# Patient Record
Sex: MALE | Race: WHITE | NOT HISPANIC OR LATINO | Employment: OTHER | URBAN - METROPOLITAN AREA
[De-identification: names, ages, dates, MRNs, and addresses within clinical notes are randomized per-mention and may not be internally consistent; named-entity substitution may affect disease eponyms.]

---

## 2017-05-09 ENCOUNTER — TRANSCRIBE ORDERS (OUTPATIENT)
Dept: ADMINISTRATIVE | Facility: HOSPITAL | Age: 62
End: 2017-05-09

## 2017-05-09 DIAGNOSIS — R79.89 ELEVATED LFTS: Primary | ICD-10-CM

## 2019-03-26 ENCOUNTER — OFFICE VISIT CONVERTED (OUTPATIENT)
Dept: SURGERY | Facility: CLINIC | Age: 64
End: 2019-03-26
Attending: SURGERY

## 2019-04-15 ENCOUNTER — HOSPITAL ENCOUNTER (OUTPATIENT)
Dept: OTHER | Facility: HOSPITAL | Age: 64
Discharge: HOME | End: 2019-04-15

## 2019-04-15 LAB
ALBUMIN SERPL-MCNC: 4.3 G/DL (ref 3.5–5)
ALP SERPL-CCNC: 58 U/L (ref 56–155)
ALT SERPL-CCNC: 32 U/L (ref 10–40)
ANION GAP SERPL CALC-SCNC: 16 MMOL/L (ref 8–19)
AST SERPL-CCNC: 24 U/L (ref 15–50)
BILIRUB SERPL-MCNC: 0.45 MG/DL (ref 0.2–1.3)
BUN SERPL-MCNC: 23 MG/DL (ref 5–25)
BUN/CREAT SERPL: 19 {RATIO} (ref 6–20)
CALCIUM SERPL-MCNC: 9.3 MG/DL (ref 8.7–10.4)
CHLORIDE SERPL-SCNC: 106 MMOL/L (ref 99–111)
CONV BILI, CONJUGATED: <0.2 MG/DL (ref 0–0.6)
CONV CO2: 29 MMOL/L (ref 22–32)
CONV CREATININE URINE, RANDOM: 74.6 MG/DL (ref 10–300)
CONV MICROALBUM.,U,RANDOM: <12 MG/L (ref 0–20)
CONV TOTAL PROTEIN: 6.7 G/DL (ref 6.3–8.2)
CONV UNCONJUGATED BILIRUBIN: 0.3 MG/DL (ref 0–1.1)
CREAT UR-MCNC: 1.21 MG/DL (ref 0.7–1.2)
EST. AVERAGE GLUCOSE BLD GHB EST-MCNC: 177 MG/DL
GFR SERPLBLD BASED ON 1.73 SQ M-ARVRAT: >60 ML/MIN/{1.73_M2}
GLUCOSE SERPL-MCNC: 139 MG/DL (ref 70–99)
HBA1C MFR BLD: 7.8 % (ref 3.5–5.7)
MICROALBUMIN/CREAT UR: 16.1 MG/G{CRE} (ref 0–25)
OSMOLALITY SERPL CALC.SUM OF ELEC: 306 MOSM/KG (ref 273–304)
POTASSIUM SERPL-SCNC: 6.2 MMOL/L (ref 3.5–5.3)
SODIUM SERPL-SCNC: 145 MMOL/L (ref 135–147)
TSH SERPL-ACNC: 1.06 M[IU]/L (ref 0.27–4.2)

## 2019-04-23 ENCOUNTER — HOSPITAL ENCOUNTER (OUTPATIENT)
Dept: OTHER | Facility: HOSPITAL | Age: 64
Discharge: HOME | End: 2019-04-23

## 2019-04-23 LAB — POTASSIUM SERPL-SCNC: 5.2 MMOL/L (ref 3.5–5.3)

## 2019-04-24 ENCOUNTER — HOSPITAL ENCOUNTER (OUTPATIENT)
Dept: GASTROENTEROLOGY | Facility: HOSPITAL | Age: 64
Setting detail: HOSPITAL OUTPATIENT SURGERY
Discharge: HOME | End: 2019-04-24
Attending: SURGERY

## 2019-04-24 LAB — GLUCOSE BLD-MCNC: 165 MG/DL (ref 70–99)

## 2019-08-19 ENCOUNTER — HOSPITAL ENCOUNTER (OUTPATIENT)
Dept: URGENT CARE | Facility: CLINIC | Age: 64
Discharge: HOME | End: 2019-08-19
Attending: FAMILY MEDICINE

## 2020-06-05 ENCOUNTER — OFFICE VISIT CONVERTED (OUTPATIENT)
Dept: FAMILY MEDICINE CLINIC | Facility: CLINIC | Age: 65
End: 2020-06-05
Attending: FAMILY MEDICINE

## 2020-09-16 ENCOUNTER — OFFICE VISIT CONVERTED (OUTPATIENT)
Dept: FAMILY MEDICINE CLINIC | Facility: CLINIC | Age: 65
End: 2020-09-16
Attending: FAMILY MEDICINE

## 2020-09-18 ENCOUNTER — HOSPITAL ENCOUNTER (OUTPATIENT)
Dept: GENERAL RADIOLOGY | Facility: HOSPITAL | Age: 65
Discharge: HOME | End: 2020-09-18
Attending: FAMILY MEDICINE

## 2020-09-18 LAB
ALBUMIN SERPL-MCNC: 4.4 G/DL (ref 3.5–5)
ALBUMIN/GLOB SERPL: 1.9 {RATIO} (ref 1.4–2.6)
ALP SERPL-CCNC: 41 U/L (ref 56–155)
ALT SERPL-CCNC: 54 U/L (ref 10–40)
ANION GAP SERPL CALC-SCNC: 20 MMOL/L (ref 8–19)
AST SERPL-CCNC: 45 U/L (ref 15–50)
BASOPHILS # BLD AUTO: 0.03 10*3/UL (ref 0–0.2)
BASOPHILS NFR BLD AUTO: 0.9 % (ref 0–3)
BILIRUB SERPL-MCNC: 0.4 MG/DL (ref 0.2–1.3)
BUN SERPL-MCNC: 25 MG/DL (ref 5–25)
BUN/CREAT SERPL: 25 {RATIO} (ref 6–20)
CALCIUM SERPL-MCNC: 10.6 MG/DL (ref 8.7–10.4)
CHLORIDE SERPL-SCNC: 100 MMOL/L (ref 99–111)
CHOLEST SERPL-MCNC: 190 MG/DL (ref 107–200)
CHOLEST/HDLC SERPL: 5.9 {RATIO} (ref 3–6)
CONV ABS IMM GRAN: 0 10*3/UL (ref 0–0.2)
CONV CO2: 26 MMOL/L (ref 22–32)
CONV IMMATURE GRAN: 0 % (ref 0–1.8)
CONV TOTAL PROTEIN: 6.7 G/DL (ref 6.3–8.2)
CREAT UR-MCNC: 1.01 MG/DL (ref 0.7–1.2)
DEPRECATED RDW RBC AUTO: 45.6 FL (ref 35.1–43.9)
EOSINOPHIL # BLD AUTO: 0.06 10*3/UL (ref 0–0.7)
EOSINOPHIL # BLD AUTO: 1.8 % (ref 0–7)
ERYTHROCYTE [DISTWIDTH] IN BLOOD BY AUTOMATED COUNT: 13.2 % (ref 11.6–14.4)
GFR SERPLBLD BASED ON 1.73 SQ M-ARVRAT: >60 ML/MIN/{1.73_M2}
GLOBULIN UR ELPH-MCNC: 2.3 G/DL (ref 2–3.5)
GLUCOSE SERPL-MCNC: 195 MG/DL (ref 70–99)
HCT VFR BLD AUTO: 47.7 % (ref 42–52)
HDLC SERPL-MCNC: 32 MG/DL (ref 40–60)
HGB BLD-MCNC: 14.4 G/DL (ref 14–18)
LDLC SERPL CALC-MCNC: 71 MG/DL (ref 70–100)
LYMPHOCYTES # BLD AUTO: 1.57 10*3/UL (ref 1–5)
LYMPHOCYTES NFR BLD AUTO: 48 % (ref 20–45)
MCH RBC QN AUTO: 28.5 PG (ref 27–31)
MCHC RBC AUTO-ENTMCNC: 30.2 G/DL (ref 33–37)
MCV RBC AUTO: 94.5 FL (ref 80–96)
MONOCYTES # BLD AUTO: 0.24 10*3/UL (ref 0.2–1.2)
MONOCYTES NFR BLD AUTO: 7.3 % (ref 3–10)
NEUTROPHILS # BLD AUTO: 1.37 10*3/UL (ref 2–8)
NEUTROPHILS NFR BLD AUTO: 42 % (ref 30–85)
NRBC CBCN: 0 % (ref 0–0.7)
OSMOLALITY SERPL CALC.SUM OF ELEC: 302 MOSM/KG (ref 273–304)
PLATELET # BLD AUTO: 119 10*3/UL (ref 130–400)
PMV BLD AUTO: 11 FL (ref 9.4–12.4)
POTASSIUM SERPL-SCNC: 4.6 MMOL/L (ref 3.5–5.3)
PSA SERPL-MCNC: <0.01 NG/ML (ref 0–4)
RBC # BLD AUTO: 5.05 10*6/UL (ref 4.7–6.1)
SODIUM SERPL-SCNC: 141 MMOL/L (ref 135–147)
TESTOST SERPL-MCNC: 15 NG/DL (ref 193–740)
TRIGL SERPL-MCNC: 453 MG/DL (ref 40–150)
TSH SERPL-ACNC: 1.34 M[IU]/L (ref 0.27–4.2)
WBC # BLD AUTO: 3.27 10*3/UL (ref 4.8–10.8)

## 2020-09-23 ENCOUNTER — HOSPITAL ENCOUNTER (OUTPATIENT)
Dept: GENERAL RADIOLOGY | Facility: HOSPITAL | Age: 65
Discharge: HOME | End: 2020-09-23
Attending: FAMILY MEDICINE

## 2020-09-23 ENCOUNTER — OFFICE VISIT CONVERTED (OUTPATIENT)
Dept: FAMILY MEDICINE CLINIC | Facility: CLINIC | Age: 65
End: 2020-09-23
Attending: FAMILY MEDICINE

## 2020-09-23 LAB
CONV CREATININE URINE, RANDOM: 85.3 MG/DL (ref 10–300)
CONV MICROALBUM.,U,RANDOM: <12 MG/L (ref 0–20)
EST. AVERAGE GLUCOSE BLD GHB EST-MCNC: 220 MG/DL
HBA1C MFR BLD: 9.3 % (ref 3.5–5.7)
MICROALBUMIN/CREAT UR: 14.1 MG/G{CRE} (ref 0–25)
TESTOSTERONE, FREE: 1.3 PG/ML (ref 6.6–18.1)

## 2021-01-29 ENCOUNTER — HOSPITAL ENCOUNTER (OUTPATIENT)
Dept: GENERAL RADIOLOGY | Facility: HOSPITAL | Age: 66
Discharge: HOME | End: 2021-01-29
Attending: FAMILY MEDICINE

## 2021-01-29 LAB
CHOLEST SERPL-MCNC: 169 MG/DL (ref 107–200)
CHOLEST/HDLC SERPL: 6 {RATIO} (ref 3–6)
EST. AVERAGE GLUCOSE BLD GHB EST-MCNC: 217 MG/DL
HBA1C MFR BLD: 9.2 % (ref 3.5–5.7)
HDLC SERPL-MCNC: 28 MG/DL (ref 40–60)
LDLC SERPL CALC-MCNC: 32 MG/DL (ref 70–100)
TRIGL SERPL-MCNC: 586 MG/DL (ref 40–150)

## 2021-01-31 LAB — BACTERIA UR CULT: NORMAL

## 2021-02-04 ENCOUNTER — CONVERSION ENCOUNTER (OUTPATIENT)
Dept: FAMILY MEDICINE CLINIC | Facility: CLINIC | Age: 66
End: 2021-02-04

## 2021-02-04 ENCOUNTER — OFFICE VISIT CONVERTED (OUTPATIENT)
Dept: FAMILY MEDICINE CLINIC | Facility: CLINIC | Age: 66
End: 2021-02-04
Attending: FAMILY MEDICINE

## 2021-05-10 NOTE — H&P
History and Physical      Patient Name: Chaparro Weber   Patient ID: [id number]   Sex: Male   YOB: 1955    Primary Care Provider: Blanca Dalton MD   Referring Provider: Blanca Dalton MD    Visit Date: 2020    Provider: Charlotte Kulkarni DO   Location: [facility]   Location Address: [address] [place] [address] [address]  [place], [place]  [id number]   Location Phone: [phone]          Chief Complaint  · Establish Care      History Of Present Illness  Chaparro Weber is a 64 year old /White male who presents for evaluation and treatment of:      He is here to establish relations as a new patient. He is .  He is retired for [employer].  He has a past medical history significant for asthma, COPD, congestive heart failure, chronic back pain managed by pain management, CAD with pace maker, diabetes, hypertension, hyperlipidemia, kidney stones.  He has a family history of coronary disease and colon cancer and his dad  at 74.  He is previous smoker smoked for approximately 12 years.  He denies alcohol use.    He has lost wt. and is down from 320. He places pickle Ball.     He takes Lyrica and hydrocodone for chronic pain.    He sees urology in [facility] for urinary retension.     He has a history of hernia.     He is blood sugar is running around 200-300.     He sees [facility] spine for degenerative disc disease.    His only complaint today is that he is having dull pain that comes and goes on his right lower abdomen.  Otherwise he has no other complaints today denies chest pain, shortness of breath, weakness, numbness, nausea, vomiting, diarrhea, dizziness or syncopal event.           Past Medical History  Disease Name Date Onset Notes   Anxiety --  --    Arthritis --  --    Asthma --  --    Chest pain --  --    Chronic Obstructive Pulmonary Disease --  --    Congestive heart failure --  --    Deafness --  --    Diabetes --  --    Heart Disease --  --     Hernia --  --    High blood pressure --  --    High cholesterol --  --    Kidney stones --  --    Lung disease --  --    Night sweats --  --    Ringing in ear --  --          Past Surgical History  Procedure Name Date Notes   Appendectomy --  --    Back 2000, 2010 x 2   Cardiac --  pacemaker   Colonoscopy --  --    Gallbladder 2016 --    Kidney --  --    Neck Surgery 2005 --    Pacemaker --  --          Medication List  Name Date Started Instructions   Alfonzo Aspirin 325 mg oral tablet  take 1 tablet (325 mg) by oral route once daily   Citrucel 500 mg oral tablet  take 1 tablet by oral route daily   escitalopram oxalate 20 mg oral tablet  take 1 tablet (20 mg) by oral route once daily   hydrocodone-acetaminophen 7.5-325 mg oral tablet  take 1 tablet by oral route every 8 hours   Lyrica 150 mg oral capsule  take 1 capsule (150 mg) by oral route 2 times per day   metoprolol succinate 25 mg oral tablet extended release 24 hr  take 1 tablet (25 mg) by oral route once daily   Move Free Joint Health 750 mg-100 mg- 1.65 mg-108 mg oral tablet  take 1 tablet by oral route daily   nateglinide 60 mg oral tablet  take 1 tablet (60 mg) by oral route 3 times per day 1 to 30 minutes prior to meals   Synjardy XR 12.5-1,000 mg oral tablet, IR - ER, biphasic 24hr  take 2 tablets by oral route once daily in the morning with a meal   Tricor 145 mg oral tablet  take 1 tablet (145 mg) by oral route once daily   valsartan 160 mg oral tablet  take 1 tablet (160 mg) by oral route once daily         Allergy List  Allergen Name Date Reaction Notes   NO KNOWN DRUG ALLERGIES --  --  --          Family Medical History  Disease Name Relative/Age Notes   Prostate cancer Father/   Father   Family history of colon cancer Father/70s   Father/70s   Renal Cancer Mother/60s   Mother/60s  Mother/40s  Mother/70s         Social History  Finding Status Start/Stop Quantity Notes   Alcohol Never --/-- --  drinks no   Caffeine Current - status unknown --/--  "--  drinks 1-2 times per day  drinks regularly; coffee, tea and soft drinks; 1-2 times per day  drinks regularly; 1-2 times per day   Second hand smoke exposure Never --/-- --  no   Tobacco Former 20/30 --  former smoker; started smoking at age 22; quit smoking at age 32; smoked 30 cigarette(s) per day  former smoker; started smoking at age 22; quit smoking at age 32; smoked 30 cigarette(s) per day  never a smoker; quit smoking at age 30         Review of Systems  · Constitutional  o Denies  o : fever, fatigue, weight loss, weight gain  · HENT  o Denies  o : headaches  · Cardiovascular  o Denies  o : pedal edema, claudication, chest pressure, palpitations  · Respiratory  o Denies  o : shortness of breath, wheezing, cough, hemoptysis, dyspnea on exertion  · Gastrointestinal  o Admits  o : abdominal pain  o Denies  o : nausea, vomiting, diarrhea, constipation  · Genitourinary  o Admits  o : frequency, difficulty voiding  o Denies  o : urgency  · Neurologic  o Denies  o : altered mental status  · Musculoskeletal  o Admits  o : back pain  · Endocrine  o Denies  o : polyuria  · Psychiatric  o Denies  o : anxiety, depression, suicidal ideation  · Heme-Lymph  o Denies  o : easy bleeding, easy bruising, edema, lymph node enlargement or tenderness      Vitals  Date Time BP Position Site L\R Cuff Size HR RR TEMP (F) WT  HT  BMI kg/m2 BSA m2 O2 Sat        06/05/2020 01:30 /73 Sitting    90 - R 20 98.3 258lbs 4oz 6'  2\" 33.16 2.47 95 %          Physical Examination  · Constitutional  o Appearance  o : morbidly obese, well developed, alert, no acute distress  · Head and Face  o Head  o :   § Inspection  § : atraumatic, normocephalic  · Eyes  o Eyes  o : extraocular movements intact, no scleral icterus, no conjunctival injection  · Ears, Nose, Mouth and Throat  o Nose  o :   § Intranasal Exam  § : nares patent  o Oral Cavity  o :   § Oral Mucosa  § : moist mucous membranes  · Respiratory  o Respiratory  o : breathing " comfortably, symmetric chest rise, clear to asculatation bilaterally, no wheezes, rales, or rhonchii  · Cardiovascular  o Heart  o :   § Auscultation of Heart  § : regular rate and rhythm, no murmurs, rubs, or gallops  o Peripheral Vascular System  o :   § Extremities  § : no edema  · Gastrointestinal  o Abdominal Examination  o :   § Abdomen  § : bowel sounds present, non-distended, non-tender, no masses or hernias appreciated  · Skin and Subcutaneous Tissue  o General Inspection  o : no rashes present, no lesions present, no areas of discoloration, skin turgor normal  · Neurologic  o Cranial Nerves  o : crainial nerves 2-12 grossly intact  · Psychiatric  o General  o : normal mood and affect      Figure 1.0: Pain Rating Scale-Lorin         Assessment  · Diabetes mellitus, type 2     250.00/E11.9  · Essential hypertension     401.9/I10  · Hyperlipidemia     272.4/E78.5  · Other long term (current) drug therapy     V58.69/Z79.899  · Screening for depression     V79.0/Z13.89  · Screening for lipid disorders     V77.91/Z13.220  · Screening for colon cancer     V76.51/Z12.11  · Establishing care with new doctor, encounter for     V65.8/Z76.89  · Adult BMI 33.0-33.9 kg/sq m     V85.33/Z68.33      Plan  · Orders  o ACO-18: Negative screen for clinical depression using a standardized tool () - V79.0/Z13.89 - 06/05/2020  o JAMSHID Report (KASPR) - V58.69/Z79.899 - 06/05/2020  o ACO-39: Current medications updated and reviewed () - - 06/05/2020  o ACO-18: Negative screen for clinical depression using a standardized tool () - - 06/05/2020  o ACO-13: Fall Risk Screening with no falls in past year or only one fall without injury in the past year (1101F) - - 06/05/2020  · Medications  o Medications have been Reconciled  o Transition of Care or Provider Policy  · Instructions  o Advised that cheeses and other sources of dairy fats, animal fats, fast food, and the extras (candy, pastries, pies, doughnuts and  cookies) all contain LDL raising nutrients. Advised to increase fruits, vegetables, whole grains, and to monitor portion sizes.   o Depression Screen completed and scanned into the EMR under the designated folder within the patient's documents.  o Patient was educated/instructed on their diagnosis, treatment and medications prior to discharge from the clinic today.  · Disposition  o Follow Up in 3 months     1.  Type 2 diabetes: The patient was encouraged to continue taking his Synjardy as prescribed.  He was given lab order today for a hemoglobin A1c and microalbumin to be managed according to findings.  2.  Hypertension: The patient's blood pressures currently well controlled on his antihypertensive medications.  3.  Hyperlipidemia: Patient will be continued on his current dosage of TriCor 145 mg daily.  4.  Obesity: Diet, weight loss and exercise were highly encouraged today's visit.  Follow-up 3 months.             Electronically Signed by: Charlotte Kulkarni DO -Author on June 5, 2020 06:37:02 PM

## 2021-05-13 NOTE — PROGRESS NOTES
Progress Note      Patient Name: Chaparro Weber   Patient ID: [id number]   Sex: Male   YOB: 1955    Primary Care Provider: Charlotte Kulkarni DO   Referring Provider: Charlotte Kulkarni DO    Visit Date: 2020    Provider: Charlotte Kulkarni DO   Location: [facility]   Location Address: [address] [place] [address] [address]  [place], [place]  [id number]   Location Phone: [phone]          Chief Complaint  · 1 week follow up       History Of Present Illness  Chaparro Weber is a 65 year old /White male who presents for evaluation and treatment of:      He is here today for the management of his chronic medical conditions. He has a past medical history significant for asthma, COPD, congestive heart failure, chronic back pain managed by pain management, CAD with pace maker, diabetes, hypertension, very low testosterone levels, hyperlipidemia, kidney stones.  He has a family history of coronary disease and colon cancer and his dad  at 74.  He is previous smoker and smoked for approximately 12 years.  He denies alcohol use.    He sees [facility] spine for degenerative disc disease.    He has lost wt. and is down from 320. He plays [employer] Ball regularly for exercise.     He takes Lyrica and hydrocodone for chronic pain and is managed by pain management.    He sees urology in [facility] for urinary retention.     He has a history of hernia.     He is blood sugar is running around 200-300.     He is complaining today of low libido. He was taking testosterone injections about 6 years ago. He denies hx of prostate cancer or elevated PSA.    He also feels like his memory is not as intact as it has been in the past. He is concerned the Lyrica is causing him to be forgetful.    Otherwise he has no complaints today and denies chest pain, shortness of breath, weakness, numbness, nausea, vomiting, diarrhea, dizziness or syncopal event.            Past Medical  History  Disease Name Date Onset Notes   Anxiety --  --    Arthritis --  --    Asthma --  --    Chest pain --  --    Chronic Obstructive Pulmonary Disease --  --    Congestive heart failure --  --    Deafness --  --    Diabetes --  --    Heart Disease --  --    Hernia --  --    High blood pressure --  --    High cholesterol --  --    Kidney stones --  --    Lung disease --  --    Night sweats --  --    Ringing in ear --  --          Past Surgical History  Procedure Name Date Notes   Appendectomy --  --    Back 2000, 2010 x 2   Cardiac --  pacemaker   Colonoscopy --  --    Gallbladder 2016 --    Kidney --  --    Neck Surgery 2005 --    Pacemaker --  --          Medication List  Name Date Started Instructions   Alfonzo Aspirin 325 mg oral tablet  take 1 tablet (325 mg) by oral route once daily   Citrucel 500 mg oral tablet  take 1 tablet by oral route daily   escitalopram oxalate 20 mg oral tablet 09/16/2020 take 1 tablet (20 mg) by oral route once daily for 90 days   hydrocodone-acetaminophen 7.5-325 mg oral tablet  take 1 tablet by oral route every 8 hours   Lyrica 150 mg oral capsule  take 1 capsule (150 mg) by oral route 2 times per day   metoprolol succinate 25 mg oral tablet extended release 24 hr  take 1 tablet (25 mg) by oral route once daily   Move Free Joint Health 750 mg-100 mg- 1.65 mg-108 mg oral tablet  take 1 tablet by oral route daily   nateglinide 60 mg oral tablet 09/16/2020 take 1 tablet (60 mg) by oral route 3 times per day 1 to 30 minutes prior to meals for 90 days   rosuvastatin 5 mg oral tablet 09/29/2020 take 1 tablet (5 mg) by oral route once daily at bedtime for 30 days   Synjardy XR 12.5-1,000 mg oral tablet, IR - ER, biphasic 24hr 09/16/2020 take 2 tablets by oral route once daily in the morning with a meal for 90 days   Tricor 145 mg oral tablet 09/16/2020 take 1 tablet (145 mg) by oral route once daily for 90 days   valsartan 160 mg oral tablet  take 1 tablet (160 mg) by oral route once  "daily         Allergy List  Allergen Name Date Reaction Notes   NO KNOWN DRUG ALLERGIES --  --  --          Family Medical History  Disease Name Relative/Age Notes   Prostate cancer Father/   Father   Family history of colon cancer Father/70s   Father/70s   Renal Cancer Mother/60s   Mother/60s  Mother/40s  Mother/70s         Social History  Finding Status Start/Stop Quantity Notes   Alcohol Never --/-- --  drinks no   Caffeine Current - status unknown --/-- --  drinks 1-2 times per day  drinks regularly; coffee, tea and soft drinks; 1-2 times per day  drinks regularly; 1-2 times per day   Second hand smoke exposure Never --/-- --  no   Tobacco Former 20/30 --  former smoker; started smoking at age 22; quit smoking at age 32; smoked 30 cigarette(s) per day  former smoker; started smoking at age 22; quit smoking at age 32; smoked 30 cigarette(s) per day  never a smoker; quit smoking at age 30         Immunizations  NameDate Admin Mfg Trade Name Lot Number Route Inj VIS Given VIS Publication   Pcetckioq70/02/2020 SKB Fluarix, quadrivalent, preservative free [id number] IM UKN 09/23/2020    Comments: patient had at pharmacy 3wks ago         Review of Systems  · Constitutional  o * See HPI  · HENT  o * See HPI  · Cardiovascular  o * See HPI  · Respiratory  o * See HPI  · Gastrointestinal  o * See HPI  · Genitourinary  o * See HPI  · Neurologic  o * See HPI  · Musculoskeletal  o * See HPI  · Heme-Lymph  o * See HPI      Vitals  Date Time BP Position Site L\R Cuff Size HR RR TEMP (F) WT  HT  BMI kg/m2 BSA m2 O2 Sat FR L/min FiO2        09/23/2020 09:22 /63 Sitting    83 - R 20 98.1 251lbs 6oz 6'  2\" 32.27 2.44 93 %  21%          Physical Examination  · Constitutional  o Appearance  o : obese, well developed, alert, no obvious deformities present, NAD  · Head and Face  o Head  o :   § Inspection  § : atraumatic, normocephalic  · Ears, Nose, Mouth and Throat  o Ears  o :   § External Ears  § : normal  o Nose  o : "   § Intranasal Exam  § : nares patent  o Oral Cavity  o :   § Oral Mucosa  § : moist mucous membranes  · Respiratory  o Respiratory Effort  o : breathing comfortably, symmetric chest rise  o Auscultation of Lungs  o : clear to asculatation bilaterally, no wheezes, rales, or rhonchii  · Cardiovascular  o Heart  o :   § Auscultation of Heart  § : regular rate and rhythm, no murmurs, rubs, or gallops  o Peripheral Vascular System  o :   § Extremities  § : no edema  · Skin and Subcutaneous Tissue  o General Inspection  o : no rashes present, no lesions present, no areas of discoloration, skin turgor normal, texture normal  · Neurologic  o Mental Status Examination  o :   § Orientation  § : grossly oriented to person, place and time  o Cranial Nerves  o : cranial nerves intact bilaterally  o Gait and Station  o :   § Gait Screening  § : normal gait  · Psychiatric  o General  o : normal mood and affect              Assessment  · Diabetes mellitus, type 2     250.00/E11.9  will check A1C as this was not collected with his most recent labs and will manage according to findings. He does not want to start any new medications today and is wanted to adjust his diet instead.  · Hyperlipidemia     272.4/E78.5  start crestor 5 mg Po HS as his Triglycerides are still elevated and he is diabetic  · Low testosterone in male     790.99/R79.89  patient wants to see urology to see if he will be able to do replacement injections himself      Plan  · Orders  o Hgb A1c [facility] ([id number]) - 250.00/E11.9 - 09/23/2020  o Lipid Panel [facility] ([id number]) - 272.4/E78.5 - 12/25/2020  o ACO-39: Current medications updated and reviewed () - - 09/23/2020  o ACO-14: Influenza immunization administered or previously received () - - 09/23/2020  o ACO-19: Colorectal cancer screening results documented and reviewed (3017F) - - 09/23/2020  o Microalbumin urine (48303) - 250.00/E11.9 - 09/23/2020  o UROLOGY CONSULTATION (UROLO) - 790.99/R79.89 - 09/23/2020    Dr. Warner please  o Hgb A1c [facility] ([id number]) - 250.00/E11.9 - 12/25/2020  · Medications  o rosuvastatin 5 mg oral tablet   SIG: take 1 tablet (5 mg) by oral route once daily at bedtime for 30 days   DISP: (30) tablets with 5 refills  Prescribed on 09/23/2020     o Medications have been Reconciled  o Transition of Care or Provider Policy  · Instructions  o Advised that cheeses and other sources of dairy fats, animal fats, fast food, and the extras (candy, pastries, pies, doughnuts and cookies) all contain LDL raising nutrients. Advised to increase fruits, vegetables, whole grains, and to monitor portion sizes.   o Patient was educated/instructed on their diagnosis, treatment and medications prior to discharge from the clinic today.  · Disposition  o Follow Up in 3 months            Electronically Signed by: Charlotte Kulkarni DO -Author on October 4, 2020 02:39:13 PM

## 2021-05-13 NOTE — PROGRESS NOTES
Progress Note      Patient Name: Chaparro Weber   Patient ID: [id number]   Sex: Male   YOB: 1955    Primary Care Provider: Charlotte Kulkarni DO   Referring Provider: Charlotte Kulkarni DO    Visit Date: 2020    Provider: Charlotte Kulkarni DO   Location: [facility]   Location Address: [address] [place] [address] [address]  [place], [place]  [id number]   Location Phone: [phone]          Chief Complaint  · 3 mo follow up  · wants labs done for low testosterone       History Of Present Illness  Chaparro Weber is a 65 year old /White male who presents for evaluation and treatment of:      He is here today for the management of his chronic medical conditions. He is .  He is retired for [employer].  He has a past medical history significant for asthma, COPD, congestive heart failure, chronic back pain managed by pain management, CAD with pace maker, diabetes, hypertension, hyperlipidemia, kidney stones.  He has a family history of coronary disease and colon cancer and his dad  at 74.  He is previous smoker smoked for approximately 12 years.  He denies alcohol use.    He has lost wt. and is down from 320. He plays pickle Ball.     He takes Lyrica and hydrocodone for chronic pain.    He sees urology in [facility] for urinary retension.     He has a history of hernia.     He is blood sugar is running around 200-300.     He sees [facility] spine for degenerative disc disease.    He sees [facility] spine for degenerative disc disease.    He wants his testosterone levels checked. He was taking testosterone injections about 6 years ago. He denies hx of prostate cancer or elevated PSA.    He also feels like his memory is not as intact as it has been in the past. He is concerned the gabapentin in causing him to be forgetful.    His only complaint today is that he is having dull pain that comes and goes on his right lower abdomen.  Otherwise he has no other  complaints today denies chest pain, shortness of breath, weakness, numbness, nausea, vomiting, diarrhea, dizziness or syncopal event.       Past Medical History  Disease Name Date Onset Notes   Anxiety --  --    Arthritis --  --    Asthma --  --    Chest pain --  --    Chronic Obstructive Pulmonary Disease --  --    Congestive heart failure --  --    Deafness --  --    Diabetes --  --    Heart Disease --  --    Hernia --  --    High blood pressure --  --    High cholesterol --  --    Kidney stones --  --    Lung disease --  --    Night sweats --  --    Ringing in ear --  --          Past Surgical History  Procedure Name Date Notes   Appendectomy --  --    Back 2000, 2010 x 2   Cardiac --  pacemaker   Colonoscopy --  --    Gallbladder 2016 --    Kidney --  --    Neck Surgery 2005 --    Pacemaker --  --          Medication List  Name Date Started Instructions   Alfonzo Aspirin 325 mg oral tablet  take 1 tablet (325 mg) by oral route once daily   Citrucel 500 mg oral tablet  take 1 tablet by oral route daily   escitalopram oxalate 20 mg oral tablet 09/16/2020 take 1 tablet (20 mg) by oral route once daily for 90 days   hydrocodone-acetaminophen 7.5-325 mg oral tablet  take 1 tablet by oral route every 8 hours   Lyrica 150 mg oral capsule  take 1 capsule (150 mg) by oral route 2 times per day   metoprolol succinate 25 mg oral tablet extended release 24 hr  take 1 tablet (25 mg) by oral route once daily   [facility] [employer] 750 mg-100 mg- 1.65 mg-108 mg oral tablet  take 1 tablet by oral route daily   nateglinide 60 mg oral tablet 09/16/2020 take 1 tablet (60 mg) by oral route 3 times per day 1 to 30 minutes prior to meals for 90 days   Synjardy XR 12.5-1,000 mg oral tablet, IR - ER, biphasic 24hr 09/16/2020 take 2 tablets by oral route once daily in the morning with a meal for 90 days   Tricor 145 mg oral tablet 09/16/2020 take 1 tablet (145 mg) by oral route once daily for 90 days   valsartan 160 mg oral tablet  take  "1 tablet (160 mg) by oral route once daily         Allergy List  Allergen Name Date Reaction Notes   NO KNOWN DRUG ALLERGIES --  --  --          Family Medical History  Disease Name Relative/Age Notes   Prostate cancer Father/   Father   Family history of colon cancer Father/70s   Father/70s   Renal Cancer Mother/60s   Mother/60s  Mother/40s  Mother/70s         Social History  Finding Status Start/Stop Quantity Notes   Alcohol Never --/-- --  drinks no   Caffeine Current - status unknown --/-- --  drinks 1-2 times per day  drinks regularly; coffee, tea and soft drinks; 1-2 times per day  drinks regularly; 1-2 times per day   Second hand smoke exposure Never --/-- --  no   Tobacco Former 20/30 --  former smoker; started smoking at age 22; quit smoking at age 32; smoked 30 cigarette(s) per day  former smoker; started smoking at age 22; quit smoking at age 32; smoked 30 cigarette(s) per day  never a smoker; quit smoking at age 30         Review of Systems  · Constitutional  o Denies  o : fever, fatigue, weight loss, weight gain  · Cardiovascular  o Denies  o : lower extremity edema, claudication, chest pressure, palpitations  · Respiratory  o Denies  o : shortness of breath, wheezing, cough, hemoptysis, dyspnea on exertion  · Gastrointestinal  o Denies  o : nausea, vomiting, diarrhea, constipation, abdominal pain  · Genitourinary  o Admits  o : decreased libido  · Neurologic  o Denies  o : tingling or numbness  · Musculoskeletal  o Denies  o : muscular weakness      Vitals  Date Time BP Position Site L\R Cuff Size HR RR TEMP (F) WT  HT  BMI kg/m2 BSA m2 O2 Sat        09/16/2020 01:58 PM 95/62 Sitting    92 - R 18 98.2 256lbs 0oz 6'  2\" 32.87 2.46 95 %          Physical Examination  · Constitutional  o Appearance  o : morbidly obese, well developed, alert, no obvious deformities present, NAD  · Head and Face  o Head  o :   § Inspection  § : atraumatic, normocephalic  · Ears, Nose, Mouth and Throat  o Ears  o : "   § External Ears  § : normal  o Nose  o :   § Intranasal Exam  § : nares patent  o Oral Cavity  o :   § Oral Mucosa  § : moist mucous membranes  · Respiratory  o Respiratory Effort  o : breathing comfortably, symmetric chest rise  o Auscultation of Lungs  o : clear to asculatation bilaterally, no wheezes, rales, or rhonchii  · Cardiovascular  o Heart  o :   § Auscultation of Heart  § : regular rate and rhythm, no murmurs, rubs, or gallops  o Peripheral Vascular System  o :   § Extremities  § : no edema  · Neurologic  o Mental Status Examination  o :   § Orientation  § : grossly oriented to person, place and time  o Cranial Nerves  o : cranial nerves intact bilaterally  o Gait and Station  o :   § Gait Screening  § : normal gait  · Psychiatric  o General  o : normal mood and affect              Assessment  · Diabetes mellitus, type 2     250.00/E11.9  discussed weight management, diet and exercise. Will check A1C  · Screening for lipid disorders     V77.91/Z13.220  · Decreased libido     799.81/R68.82  He was given lab orders today for his testosterone level to be managed according to finding.  · Forgetfulness     780.99/R68.89  This could be due to low T levels, they are being checked.   · Diabetic neuropathy       Type 2 diabetes mellitus with diabetic neuropathy, unspecified     250.60/E11.40  continue lyrica, lower BG levels with medicaiton ,diet and exercise to decrease symptoms      Plan  · Orders  o Hgb A1c [facility] (30202) - 250.00/E11.9 - 09/16/2020  o Male Physical Primary Care Panel (CMP, CBC, TSH, Lipid, PSA) [facility] (61863, 90650, 41353, 85668, 72939, ) - 250.00/E11.9, 799.81/R68.82, V77.91/Z13.220 - 09/16/2020  o ACO-39: Current medications updated and reviewed () - - 09/16/2020  o ACO-15: Pneumococcal Vaccine Administered or Previously Received (4040F) - - 09/16/2020  o ACO-14: Influenza immunization administered or previously received () - - 09/16/2020  o ACO-19: Colorectal cancer screening  results documented and reviewed (3017F) - - 09/16/2020  o ACO-13: Fall Risk Screening with no falls in past year or only one fall without injury in the past year (1101F) - - 09/16/2020  o ACO - Pt declines to or was not able to provide an Advance Care Plan or name a Surrogate Decision Maker (1124F) - - 09/16/2020  o Testosterone, Free (Direct) (00200) - 799.81/R68.82 - 09/16/2020  o Testosterone (Total) (63529) - 799.81/R68.82 - 09/16/2020  · Medications  o Medications have been Reconciled  o Transition of Care or Provider Policy  · Instructions  o Patient was educated/instructed on their diagnosis, treatment and medications prior to discharge from the clinic today.  · Disposition  o Return Visit Request in/on 1 week +/- 2 days (29634).            Electronically Signed by: Charlotte Kulkarni DO -Author on September 20, 2020 09:53:07 PM

## 2021-05-14 VITALS
HEIGHT: 74 IN | RESPIRATION RATE: 20 BRPM | DIASTOLIC BLOOD PRESSURE: 80 MMHG | WEIGHT: 256.37 LBS | SYSTOLIC BLOOD PRESSURE: 110 MMHG | TEMPERATURE: 97.2 F | BODY MASS INDEX: 32.9 KG/M2 | HEART RATE: 80 BPM | OXYGEN SATURATION: 96 %

## 2021-05-14 VITALS
SYSTOLIC BLOOD PRESSURE: 95 MMHG | WEIGHT: 256 LBS | HEART RATE: 92 BPM | HEIGHT: 74 IN | TEMPERATURE: 98.2 F | DIASTOLIC BLOOD PRESSURE: 62 MMHG | OXYGEN SATURATION: 95 % | BODY MASS INDEX: 32.85 KG/M2 | RESPIRATION RATE: 18 BRPM

## 2021-05-14 VITALS
OXYGEN SATURATION: 93 % | SYSTOLIC BLOOD PRESSURE: 101 MMHG | TEMPERATURE: 98.1 F | BODY MASS INDEX: 32.26 KG/M2 | DIASTOLIC BLOOD PRESSURE: 63 MMHG | WEIGHT: 251.37 LBS | RESPIRATION RATE: 20 BRPM | HEIGHT: 74 IN | HEART RATE: 83 BPM

## 2021-05-14 NOTE — PROGRESS NOTES
Progress Note      Patient Name: Chaparro Weber   Patient ID: [id number]   Sex: Male   YOB: 1955    Primary Care Provider: Charlotte Kulkarni DO   Referring Provider: Charlotte Kulkarni DO    Visit Date: 2021    Provider: Charlotte Kulkarni DO   Location: [facility]   Location Address: [address] [place] [address]  [id number]   Location Phone: [phone]          Chief Complaint  · 3 mo follow up   · discuss medications   · go over labs   · left foot numbness       History Of Present Illness  Chaparro Weber is a 65 year old /White male who presents for evaluation and treatment of:      He is here today for the management of his chronic medical conditions. He has a past medical history significant for asthma, COPD, congestive heart failure, chronic back pain managed by pain management, CAD with pace maker, diabetes, hypertension, very low testosterone levels, hyperlipidemia, kidney stones.  He has a family history of coronary disease and colon cancer and his dad  at 74.  He is previous smoker and smoked for approximately 12 years.  He denies alcohol use.    Colonoscopy  Flu/pneumonia/Covid    Labs 2021: Hemoglobin A1c 9.2, estimated average glucose 217, triglyceride 586, cholesterol 169, , HDL 28.    Labs 2020: Hemoglobin 14.4, MCV 94.5, platelet 119, potassium 4.6, creatinine 1.01, GFR greater than 60, glucose 195, calcium 10.6, ALT 54, AST 45, TSH 1.34, total testosterone 15, free testosterone 1.3.    He sees [facility] spine for degenerative disc disease. He takes Lyrica and hydrocodone for chronic pain and is managed by pain management.    He plays pickle Ball regularly for exercise.     He sees urology in [place] for urinary retention.     He is blood sugar is running around 200-300. He has gained 5 pounds since his next visit.    He is still having low libido. He has very low Testosterone but is not interested in endogenous  replacement. He denies hx of prostate cancer or elevated PSA.    Otherwise he has no complaints today and denies chest pain, shortness of breath, weakness, numbness, nausea, vomiting, diarrhea, dizziness or syncopal event.                 Past Medical History  Disease Name Date Onset Notes   Anxiety --  --    Arthritis --  --    Asthma --  --    Chest pain --  --    Chronic Obstructive Pulmonary Disease --  --    Congestive heart failure --  --    Deafness --  --    Diabetes --  --    Heart Disease --  --    Hernia --  --    High blood pressure --  --    High cholesterol --  --    Kidney stones --  --    Lung disease --  --    Night sweats --  --    Ringing in ear --  --          Past Surgical History  Procedure Name Date Notes   Appendectomy --  --    Back 2000, 2010 x 2   Cardiac --  pacemaker   Colonoscopy --  --    Gallbladder 2016 --    Kidney --  --    Neck Surgery 2005 --    Pacemaker --  --          Medication List  Name Date Started Instructions   Alfonzo Aspirin 325 mg oral tablet  take 1 tablet (325 mg) by oral route once daily   Citrucel 500 mg oral tablet  take 1 tablet by oral route daily   escitalopram oxalate 20 mg oral tablet 12/14/2020 take 1 tablet (20 mg) by oral route once daily for 90 days   hydrocodone-acetaminophen 7.5-325 mg oral tablet  take 1 tablet by oral route every 8 hours   Lyrica 150 mg oral capsule  take 1 capsule (150 mg) by oral route 2 times per day   metoprolol succinate 25 mg oral tablet extended release 24 hr 01/14/2021 take 1 tablet (25 mg) by oral route once daily for 30 days   [facility] 750 mg-100 mg- 1.65 mg-108 mg oral tablet  take 1 tablet by oral route daily   OneTouch Verio miscellaneous strip 11/25/2020 USE AS DIRECTED TO TEST BLOOD SUGAR TWICE DAILY   rosuvastatin 5 mg oral tablet 09/29/2020 take 1 tablet (5 mg) by oral route once daily at bedtime for 30 days   Synjardy XR 12.5-1,000 mg oral tablet, IR - ER, biphasic 24hr 12/09/2020 take 2 tablets by oral  "route once daily in the morning with a meal for 90 days   Tricor 145 mg oral tablet 09/16/2020 take 1 tablet (145 mg) by oral route once daily for 90 days   valsartan 160 mg oral tablet 01/19/2021 take 1 tablet (160 mg) by oral route once daily for 30 days         Allergy List  Allergen Name Date Reaction Notes   NO KNOWN DRUG ALLERGIES --  --  --          Family Medical History  Disease Name Relative/Age Notes   Prostate cancer Father/   Father   Family history of colon cancer Father/70s   Father/70s   Renal Cancer Mother/60s   Mother/60s  Mother/40s  Mother/70s         Social History  Finding Status Start/Stop Quantity Notes   Alcohol Never --/-- --  drinks no   Caffeine Current - status unknown --/-- --  drinks 1-2 times per day  drinks regularly; coffee, tea and soft drinks; 1-2 times per day  drinks regularly; 1-2 times per day   Second hand smoke exposure Never --/-- --  no   Tobacco Former 20/30 --  former smoker; started smoking at age 22; quit smoking at age 32; smoked 30 cigarette(s) per day  former smoker; started smoking at age 22; quit smoking at age 32; smoked 30 cigarette(s) per day  never a smoker; quit smoking at age 30         Immunizations  NameDate Admin Mfg Trade Name Lot Number Route Inj VIS Given VIS Publication   [id number]/02/2020 SKB Fluarix, quadrivalent, preservative free [id number]  UKN 09/23/2020    Comments: patient had at pharmacy 3wks ago         Review of Systems  · Constitutional  o * See HPI  · HENT  o * See HPI  · Cardiovascular  o * See HPI  · Respiratory  o * See HPI  · Gastrointestinal  o * See HPI  · Genitourinary  o * See HPI  · Neurologic  o * See HPI  · Musculoskeletal  o * See HPI      Vitals  Date Time BP Position Site L\R Cuff Size HR RR TEMP (F) WT  HT  BMI kg/m2 BSA m2 O2 Sat FR L/min FiO2 HC       02/04/2021 11:04 /80 Sitting    80 - R 20 97.2 256lbs 6oz 6'  2\" 32.92 2.46 96 %  21%          Physical Examination  · Constitutional  o Appearance  o : no acute " distress, well-nourished  · Head and Face  o Head  o :   § Inspection  § : atraumatic, normocephalic  · Ears, Nose, Mouth and Throat  o Ears  o :   § External Ears  § : normal  o Nose  o :   § Intranasal Exam  § : nares patent  o Oral Cavity  o :   § Oral Mucosa  § : moist mucous membranes  · Respiratory  o Respiratory Effort  o : breathing comfortably, symmetric chest rise  o Auscultation of Lungs  o : clear to asculatation bilaterally, no wheezes, rales, or rhonchii  · Cardiovascular  o Heart  o :   § Auscultation of Heart  § : regular rate and rhythm, no murmurs, rubs, or gallops  o Peripheral Vascular System  o :   § Extremities  § : no edema  · Neurologic  o Mental Status Examination  o :   § Orientation  § : grossly oriented to person, place and time  o Gait and Station  o :   § Gait Screening  § : normal gait  · Psychiatric  o General  o : normal mood and affect      Figure 1.0: Diabetic Foot Screen             Assessment  · Diabetes mellitus, type 2     250.00/E11.9  The patient's diabetes is currently not well controlled. He was given a prescription today of Victoza to be taken as directed.  · Essential hypertension     401.9/I10  The patient's blood pressures currently well controlled.  · Adult BMI 32.0-32.9 kg/sq m     V85.32/Z68.32  Diet, weight loss and exercise were highly encouraged today's visit.  · Hypogonadism in male     257.2/E29.1  The patient's not interested in testosterone replacement at this time.      Plan  · Orders  o ACO-14: Influenza immunization was not administered for reasons documented [facility] () - - 02/04/2021  o ACO-19: Colorectal cancer screening results documented and reviewed (5927F) - - 02/04/2021  o ACO - Pt declines to or was not able to provide an Advance Care Plan or name a Surrogate Decision Maker (3669F) - - 02/04/2021  o ACO-39: Current medications updated and reviewed (3369F, ) - - 02/04/2021  · Medications  o Trulicity 0.75 mg/0.5 mL subcutaneous pen injector    SIG: inject 0.5 milliliter (0.75 mg) by subcutaneous route every 7 days for 28 days   DISP: (1) Package with 2 refills  Prescribed on 02/04/2021     o Medications have been Reconciled  o Transition of Care or Provider Policy  · Instructions  o Patient was educated/instructed on their diagnosis, treatment and medications prior to discharge from the clinic today.  · Disposition  o Follow Up in 3 months            Electronically Signed by: Charlotte Kulkarni DO -Author on February 5, 2021 06:28:30 PM

## 2021-05-15 VITALS
DIASTOLIC BLOOD PRESSURE: 73 MMHG | HEART RATE: 90 BPM | TEMPERATURE: 98.3 F | SYSTOLIC BLOOD PRESSURE: 129 MMHG | WEIGHT: 258.25 LBS | HEIGHT: 74 IN | RESPIRATION RATE: 20 BRPM | BODY MASS INDEX: 33.14 KG/M2 | OXYGEN SATURATION: 95 %

## 2021-05-15 VITALS — BODY MASS INDEX: 34.52 KG/M2 | RESPIRATION RATE: 14 BRPM | HEIGHT: 74 IN | WEIGHT: 269 LBS

## 2021-05-21 ENCOUNTER — HOSPITAL ENCOUNTER (OUTPATIENT)
Dept: GENERAL RADIOLOGY | Facility: HOSPITAL | Age: 66
Discharge: HOME | End: 2021-05-21
Attending: NURSE PRACTITIONER

## 2021-07-13 ENCOUNTER — TELEPHONE (OUTPATIENT)
Dept: FAMILY MEDICINE CLINIC | Facility: CLINIC | Age: 66
End: 2021-07-13

## 2021-07-13 NOTE — TELEPHONE ENCOUNTER
Caller: Chaparro Weber    Relationship: Self    Best call back number: [phone]    What orders are you requesting (i.e. lab or imaging): BLOOD WORK FOR APPOINTMENT ON 07.16.2021 AND TESTOSTERONE TEST.     In what timeframe would the patient need to come in: BEFORE 07.16.2021    Where will you receive your lab/imaging services: IN OFFICE LAB    Additional notes:

## 2021-07-16 ENCOUNTER — OFFICE VISIT (OUTPATIENT)
Dept: FAMILY MEDICINE CLINIC | Facility: CLINIC | Age: 66
End: 2021-07-16

## 2021-07-16 VITALS
TEMPERATURE: 97.6 F | OXYGEN SATURATION: 95 % | DIASTOLIC BLOOD PRESSURE: 78 MMHG | BODY MASS INDEX: 33.57 KG/M2 | RESPIRATION RATE: 18 BRPM | HEIGHT: 74 IN | SYSTOLIC BLOOD PRESSURE: 115 MMHG | WEIGHT: 261.6 LBS | HEART RATE: 77 BPM

## 2021-07-16 DIAGNOSIS — I10 ESSENTIAL HYPERTENSION: ICD-10-CM

## 2021-07-16 DIAGNOSIS — Z12.5 SCREENING FOR PROSTATE CANCER: ICD-10-CM

## 2021-07-16 DIAGNOSIS — E29.1 HYPOGONADISM IN MALE: ICD-10-CM

## 2021-07-16 DIAGNOSIS — Z00.00 ANNUAL PHYSICAL EXAM: ICD-10-CM

## 2021-07-16 DIAGNOSIS — E78.49 OTHER HYPERLIPIDEMIA: ICD-10-CM

## 2021-07-16 DIAGNOSIS — E11.43 TYPE 2 DIABETES MELLITUS WITH DIABETIC AUTONOMIC NEUROPATHY, WITHOUT LONG-TERM CURRENT USE OF INSULIN (HCC): Primary | ICD-10-CM

## 2021-07-16 PROBLEM — F41.9 ANXIETY: Status: ACTIVE | Noted: 2021-07-16

## 2021-07-16 PROBLEM — J45.909 ASTHMA: Status: ACTIVE | Noted: 2021-07-16

## 2021-07-16 PROBLEM — M19.90 ARTHRITIS: Status: ACTIVE | Noted: 2021-07-16

## 2021-07-16 PROBLEM — K21.9 GERD (GASTROESOPHAGEAL REFLUX DISEASE): Status: ACTIVE | Noted: 2021-07-16

## 2021-07-16 PROBLEM — I44.7 LEFT BUNDLE BRANCH BLOCK: Status: ACTIVE | Noted: 2021-07-16

## 2021-07-16 PROBLEM — N20.0 KIDNEY STONES: Status: ACTIVE | Noted: 2021-07-16

## 2021-07-16 PROBLEM — E11.42 DIABETIC PERIPHERAL NEUROPATHY: Status: ACTIVE | Noted: 2021-07-16

## 2021-07-16 PROBLEM — E78.5 HYPERLIPIDEMIA: Status: ACTIVE | Noted: 2021-07-16

## 2021-07-16 PROBLEM — D75.1 ACQUIRED POLYCYTHEMIA: Status: ACTIVE | Noted: 2021-07-16

## 2021-07-16 PROBLEM — J44.9 COPD (CHRONIC OBSTRUCTIVE PULMONARY DISEASE): Status: ACTIVE | Noted: 2021-07-16

## 2021-07-16 PROBLEM — I42.8 NICM (NONISCHEMIC CARDIOMYOPATHY): Status: ACTIVE | Noted: 2021-07-16

## 2021-07-16 PROBLEM — I51.9 HEART DISEASE: Status: ACTIVE | Noted: 2021-07-16

## 2021-07-16 PROCEDURE — G0439 PPPS, SUBSEQ VISIT: HCPCS | Performed by: FAMILY MEDICINE

## 2021-07-16 RX ORDER — METOPROLOL SUCCINATE 25 MG/1
TABLET, EXTENDED RELEASE ORAL
COMMUNITY
Start: 2021-04-28 | End: 2022-01-27

## 2021-07-16 RX ORDER — FENOFIBRATE 145 MG/1
TABLET, COATED ORAL
COMMUNITY
Start: 2021-05-09 | End: 2021-11-16

## 2021-07-16 RX ORDER — ESCITALOPRAM OXALATE 20 MG/1
TABLET ORAL
COMMUNITY
Start: 2021-07-01 | End: 2021-10-04

## 2021-07-16 RX ORDER — IBUPROFEN 200 MG
200 TABLET ORAL EVERY 6 HOURS PRN
COMMUNITY

## 2021-07-16 RX ORDER — VALSARTAN 160 MG/1
TABLET ORAL
COMMUNITY
Start: 2021-01-20 | End: 2021-12-10

## 2021-07-16 RX ORDER — MELATONIN
2000 DAILY
COMMUNITY

## 2021-07-16 RX ORDER — ROSUVASTATIN CALCIUM 5 MG/1
TABLET, COATED ORAL
COMMUNITY
Start: 2021-07-13 | End: 2021-09-17

## 2021-07-16 RX ORDER — ASPIRIN 325 MG
325 TABLET, DELAYED RELEASE (ENTERIC COATED) ORAL DAILY
COMMUNITY

## 2021-07-16 RX ORDER — HYDROCODONE BITARTRATE AND ACETAMINOPHEN 7.5; 325 MG/1; MG/1
TABLET ORAL
COMMUNITY

## 2021-07-16 RX ORDER — LANOLIN ALCOHOL/MO/W.PET/CERES
1200 CREAM (GRAM) TOPICAL DAILY
COMMUNITY

## 2021-07-16 RX ORDER — PREGABALIN 300 MG/1
300 CAPSULE ORAL DAILY
COMMUNITY
Start: 2021-06-26

## 2021-07-16 RX ORDER — EMPAGLIFLOZIN, METFORMIN HYDROCHLORIDE 12.5; 1 MG/1; MG/1
TABLET, EXTENDED RELEASE ORAL
COMMUNITY
Start: 2020-12-09 | End: 2021-10-26

## 2021-07-16 RX ORDER — NATEGLINIDE 60 MG/1
TABLET ORAL
COMMUNITY
End: 2022-04-11

## 2021-07-16 NOTE — PROGRESS NOTES
The ABCs of the Annual Wellness Visit  Subsequent Medicare Wellness Visit    Chief Complaint   Patient presents with   • Medicare Wellness-subsequent       Subjective   History of Present Illness:  Chaparro Weber is a 65 y.o. male who presents for a Subsequent Medicare Wellness Visit.    HEALTH RISK ASSESSMENT    Recent Hospitalizations:  No hospitalization(s) within the last year.    Current Medical Providers:  Patient Care Team:  Charlotte Kulkarni DO as PCP - General (Family Medicine)    Smoking Status:  Social History     Tobacco Use   Smoking Status Former Smoker   • Packs/day: 1.50       Alcohol Consumption:  Social History     Substance and Sexual Activity   Alcohol Use Never       Depression Screen:   No flowsheet data found.    Fall Risk Screen:  STEADI Fall Risk Assessment has not been completed.    Health Habits and Functional and Cognitive Screening:  No flowsheet data found.      Does the patient have evidence of cognitive impairment? Yes    Asprin use counseling:Taking ASA appropriately as indicated    Age-appropriate Screening Schedule:  Refer to the list below for future screening recommendations based on patient's age, sex and/or medical conditions. Orders for these recommended tests are listed in the plan section. The patient has been provided with a written plan.    Health Maintenance   Topic Date Due   • TDAP/TD VACCINES (1 - Tdap) Never done   • ZOSTER VACCINE (1 of 2) Never done   • DIABETIC FOOT EXAM  Never done   • DIABETIC EYE EXAM  Never done   • HEMOGLOBIN A1C  07/29/2021   • INFLUENZA VACCINE  08/01/2021   • URINE MICROALBUMIN  09/23/2021   • LIPID PANEL  01/29/2022          The following portions of the patient's history were reviewed and updated as appropriate: allergies, current medications, past family history, past medical history, past social history, past surgical history and problem list.    No outpatient medications prior to visit.     No facility-administered medications prior  "to visit.       There is no problem list on file for this patient.      Advanced Care Planning:  ACP discussion was held with the patient during this visit. Patient has an advance directive in EMR which is still valid.     Review of Systems   Constitutional: Negative for activity change and unexpected weight change.   HENT: Negative for trouble swallowing and voice change.    Eyes: Negative for visual disturbance.   Respiratory: Negative for apnea and shortness of breath.    Cardiovascular: Negative for chest pain and palpitations.   Gastrointestinal: Negative for blood in stool.   Endocrine: Negative for polydipsia, polyphagia and polyuria.   Skin: Negative for color change.   Neurological: Negative for seizures.   Hematological: Negative for adenopathy.   Psychiatric/Behavioral: Negative for agitation and behavioral problems.       Compared to one year ago, the patient feels his physical health is the same.  Compared to one year ago, the patient feels his mental health is the same.    Reviewed chart for potential of high risk medication in the elderly: yes  Reviewed chart for potential of harmful drug interactions in the elderly:yes    Objective         Vitals:    07/16/21 1031   BP: 115/78   BP Location: Left arm   Patient Position: Sitting   Pulse: 77   Resp: 18   Temp: 97.6 °F (36.4 °C)   SpO2: 95%   Weight: 119 kg (261 lb 9.6 oz)   Height: 188 cm (74.02\")   PainSc: 0-No pain       Body mass index is 33.57 kg/m².  Discussed the patient's BMI with him. The BMI is above average; BMI management plan is completed.    Physical Exam  Vitals reviewed.   Constitutional:       General: He is not in acute distress.     Appearance: Normal appearance. He is obese. He is not diaphoretic.   HENT:      Head: Normocephalic and atraumatic. Hair is normal.      Right Ear: Hearing, tympanic membrane, ear canal and external ear normal.      Left Ear: Hearing, tympanic membrane, ear canal and external ear normal.      Nose: Nose " normal. No nasal deformity.      Mouth/Throat:      Mouth: Mucous membranes are moist. No oral lesions.      Pharynx: Uvula midline. No uvula swelling.   Eyes:      General: Lids are normal. No scleral icterus.        Right eye: No discharge.         Left eye: No discharge.      Extraocular Movements: Extraocular movements intact.      Right eye: Normal extraocular motion and no nystagmus.      Left eye: Normal extraocular motion and no nystagmus.      Conjunctiva/sclera: Conjunctivae normal.      Pupils: Pupils are equal, round, and reactive to light.   Neck:      Thyroid: No thyromegaly.      Vascular: No JVD.   Cardiovascular:      Rate and Rhythm: Normal rate and regular rhythm.      Pulses: Normal pulses.      Heart sounds: Normal heart sounds. No murmur heard.   No gallop.    Pulmonary:      Effort: Pulmonary effort is normal. No respiratory distress.      Breath sounds: Normal breath sounds. No wheezing or rales.   Chest:      Chest wall: No tenderness.   Abdominal:      General: Bowel sounds are normal. There is no distension.      Palpations: Abdomen is soft. There is no mass.      Tenderness: There is no abdominal tenderness. There is no guarding.      Hernia: No hernia is present.   Musculoskeletal:         General: No tenderness or deformity. Normal range of motion.      Cervical back: Normal range of motion and neck supple.   Lymphadenopathy:      Cervical: No cervical adenopathy.   Skin:     General: Skin is warm and dry.      Findings: No rash.   Neurological:      Mental Status: He is alert and oriented to person, place, and time.      Cranial Nerves: No cranial nerve deficit.      Coordination: Coordination normal.      Deep Tendon Reflexes: Reflexes are normal and symmetric. Reflexes normal.   Psychiatric:         Mood and Affect: Mood normal.         Behavior: Behavior normal.         Thought Content: Thought content normal.         Judgment: Judgment normal.               Assessment/Plan    Medicare Risks and Personalized Health Plan  CMS Preventative Services Quick Reference  Obesity/Overweight     The above risks/problems have been discussed with the patient.  Pertinent information has been shared with the patient in the After Visit Summary.  Follow up plans and orders are seen below in the Assessment/Plan Section.    There are no diagnoses linked to this encounter.  Follow Up:  No follow-ups on file.     An After Visit Summary and PPPS were given to the patient.

## 2021-07-16 NOTE — PATIENT INSTRUCTIONS
Calorie Counting for Weight Loss  Calories are units of energy. Your body needs a certain number of calories from food to keep going throughout the day. When you eat or drink more calories than your body needs, your body stores the extra calories mostly as fat. When you eat or drink fewer calories than your body needs, your body burns fat to get the energy it needs.  Calorie counting means keeping track of how many calories you eat and drink each day. Calorie counting can be helpful if you need to lose weight. If you eat fewer calories than your body needs, you should lose weight. Ask your health care provider what a healthy weight is for you.  For calorie counting to work, you will need to eat the right number of calories each day to lose a healthy amount of weight per week. A dietitian can help you figure out how many calories you need in a day and will suggest ways to reach your calorie goal.  · A healthy amount of weight to lose each week is usually 1-2 lb (0.5-0.9 kg). This usually means that your daily calorie intake should be reduced by 500-750 calories.  · Eating 1,200-1,500 calories a day can help most women lose weight.  · Eating 1,500-1,800 calories a day can help most men lose weight.  What do I need to know about calorie counting?  Work with your health care provider or dietitian to determine how many calories you should get each day. To meet your daily calorie goal, you will need to:  · Find out how many calories are in each food that you would like to eat. Try to do this before you eat.  · Decide how much of the food you plan to eat.  · Keep a food log. Do this by writing down what you ate and how many calories it had.  To successfully lose weight, it is important to balance calorie counting with a healthy lifestyle that includes regular activity.  Where do I find calorie information?    The number of calories in a food can be found on a Nutrition Facts label. If a food does not have a Nutrition Facts  label, try to look up the calories online or ask your dietitian for help.  Remember that calories are listed per serving. If you choose to have more than one serving of a food, you will have to multiply the calories per serving by the number of servings you plan to eat. For example, the label on a package of bread might say that a serving size is 1 slice and that there are 90 calories in a serving. If you eat 1 slice, you will have eaten 90 calories. If you eat 2 slices, you will have eaten 180 calories.  How do I keep a food log?  After each time that you eat, record the following in your food log as soon as possible:  · What you ate. Be sure to include toppings, sauces, and other extras on the food.  · How much you ate. This can be measured in cups, ounces, or number of items.  · How many calories were in each food and drink.  · The total number of calories in the food you ate.  Keep your food log near you, such as in a pocket-sized notebook or on an figueroa or website on your mobile phone. Some programs will calculate calories for you and show you how many calories you have left to meet your daily goal.  What are some portion-control tips?  · Know how many calories are in a serving. This will help you know how many servings you can have of a certain food.  · Use a measuring cup to measure serving sizes. You could also try weighing out portions on a kitchen scale. With time, you will be able to estimate serving sizes for some foods.  · Take time to put servings of different foods on your favorite plates or in your favorite bowls and cups so you know what a serving looks like.  · Try not to eat straight from a food's packaging, such as from a bag or box. Eating straight from the package makes it hard to see how much you are eating and can lead to overeating. Put the amount you would like to eat in a cup or on a plate to make sure you are eating the right portion.  · Use smaller plates, glasses, and bowls for smaller  portions and to prevent overeating.  · Try not to multitask. For example, avoid watching TV or using your computer while eating. If it is time to eat, sit down at a table and enjoy your food. This will help you recognize when you are full. It will also help you be more mindful of what and how much you are eating.  What are tips for following this plan?  Reading food labels  · Check the calorie count compared with the serving size. The serving size may be smaller than what you are used to eating.  · Check the source of the calories. Try to choose foods that are high in protein, fiber, and vitamins, and low in saturated fat, trans fat, and sodium.  Shopping  · Read nutrition labels while you shop. This will help you make healthy decisions about which foods to buy.  · Pay attention to nutrition labels for low-fat or fat-free foods. These foods sometimes have the same number of calories or more calories than the full-fat versions. They also often have added sugar, starch, or salt to make up for flavor that was removed with the fat.  · Make a grocery list of lower-calorie foods and stick to it.  Cooking  · Try to cook your favorite foods in a healthier way. For example, try baking instead of frying.  · Use low-fat dairy products.  Meal planning  · Use more fruits and vegetables. One-half of your plate should be fruits and vegetables.  · Include lean proteins, such as chicken, turkey, and fish.  Lifestyle  Each week, aim to do one of the following:  · 150 minutes of moderate exercise, such as walking.  · 75 minutes of vigorous exercise, such as running.  General information  · Know how many calories are in the foods you eat most often. This will help you calculate calorie counts faster.  · Find a way of tracking calories that works for you. Get creative. Try different apps or programs if writing down calories does not work for you.  What foods should I eat?    · Eat nutritious foods. It is better to have a nutritious,  high-calorie food, such as an avocado, than a food with few nutrients, such as a bag of potato chips.  · Use your calories on foods and drinks that will fill you up and will not leave you hungry soon after eating.  ? Examples of foods that fill you up are nuts and nut butters, vegetables, lean proteins, and high-fiber foods such as whole grains. High-fiber foods are foods with more than 5 g of fiber per serving.  · Pay attention to calories in drinks. Low-calorie drinks include water and unsweetened drinks.  The items listed above may not be a complete list of foods and beverages you can eat. Contact a dietitian for more information.  What foods should I limit?  Limit foods or drinks that are not good sources of vitamins, minerals, or protein or that are high in unhealthy fats. These include:  · Candy.  · Other sweets.  · Sodas, specialty coffee drinks, alcohol, and juice.  The items listed above may not be a complete list of foods and beverages you should avoid. Contact a dietitian for more information.  How do I count calories when eating out?  · Pay attention to portions. Often, portions are much larger when eating out. Try these tips to keep portions smaller:  ? Consider sharing a meal instead of getting your own.  ? If you get your own meal, eat only half of it. Before you start eating, ask for a container and put half of your meal into it.  ? When available, consider ordering smaller portions from the menu instead of full portions.  · Pay attention to your food and drink choices. Knowing the way food is cooked and what is included with the meal can help you eat fewer calories.  ? If calories are listed on the menu, choose the lower-calorie options.  ? Choose dishes that include vegetables, fruits, whole grains, low-fat dairy products, and lean proteins.  ? Choose items that are boiled, broiled, grilled, or steamed. Avoid items that are buttered, battered, fried, or served with cream sauce. Items labeled as  crispy are usually fried, unless stated otherwise.  ? Choose water, low-fat milk, unsweetened iced tea, or other drinks without added sugar. If you want an alcoholic beverage, choose a lower-calorie option, such as a glass of wine or light beer.  ? Ask for dressings, sauces, and syrups on the side. These are usually high in calories, so you should limit the amount you eat.  ? If you want a salad, choose a garden salad and ask for grilled meats. Avoid extra toppings such as arreguin, cheese, or fried items. Ask for the dressing on the side, or ask for olive oil and vinegar or lemon to use as dressing.  · Estimate how many servings of a food you are given. Knowing serving sizes will help you be aware of how much food you are eating at restaurants.  Where to find more information  · Centers for Disease Control and Prevention: www.cdc.gov  · [place] Department of Agriculture: myplate.gov  Summary  · Calorie counting means keeping track of how many calories you eat and drink each day. If you eat fewer calories than your body needs, you should lose weight.  · A healthy amount of weight to lose per week is usually 1-2 lb (0.5-0.9 kg). This usually means reducing your daily calorie intake by 500-750 calories.  · The number of calories in a food can be found on a Nutrition Facts label. If a food does not have a Nutrition Facts label, try to look up the calories online or ask your dietitian for help.  · Use smaller plates, glasses, and bowls for smaller portions and to prevent overeating.  · Use your calories on foods and drinks that will fill you up and not leave you hungry shortly after a meal.  This information is not intended to replace advice given to you by your health care provider. Make sure you discuss any questions you have with your health care provider.  Document Revised: 01/28/2021 Document Reviewed: 01/28/2021  Elsevier Patient Education © 2021 [employer]  Obesity, Adult  Obesity is the condition of having too much  total body fat. Being overweight or obese means that your weight is greater than what is considered healthy for your body size. Obesity is determined by a measurement called BMI. BMI is an estimate of body fat and is calculated from height and weight. For adults, a BMI of 30 or higher is considered obese.  Obesity can lead to other health concerns and major illnesses, including:  · Stroke.  · Coronary artery disease (CAD).  · Type 2 diabetes.  · Some types of cancer, including cancers of the colon, breast, uterus, and gallbladder.  · Osteoarthritis.  · High blood pressure (hypertension).  · High cholesterol.  · Sleep apnea.  · Gallbladder stones.  · Infertility problems.  What are the causes?  Common causes of this condition include:  · Eating daily meals that are high in calories, sugar, and fat.  · Being born with genes that may make you more likely to become obese.  · Having a medical condition that causes obesity, including:  ? Hypothyroidism.  ? Polycystic ovarian syndrome (PCOS).  ? Binge-eating disorder.  ? Cushing syndrome.  · Taking certain medicines, such as steroids, antidepressants, and seizure medicines.  · Not being physically active (sedentary lifestyle).  · Not getting enough sleep.  · Drinking high amounts of sugar-sweetened beverages, such as soft drinks.  What increases the risk?  The following factors may make you more likely to develop this condition:  · Having a family history of obesity.  · Being a woman of  descent.  · Being a man of  descent.  · Living in an area with limited access to:  ? [place], recreation centers, or sidewalks.  ? Healthy food choices, such as grocery stores and farmers' markets.  What are the signs or symptoms?  The main sign of this condition is having too much body fat.  How is this diagnosed?  This condition is diagnosed based on:  · Your BMI. If you are an adult with a BMI of 30 or higher, you are considered obese.  · Your waist circumference.  This measures the distance around your waistline.  · Your skinfold thickness. Your health care provider may gently pinch a fold of your skin and measure it.  You may have other tests to check for underlying conditions.  How is this treated?  Treatment for this condition often includes changing your lifestyle. Treatment may include some or all of the following:  · Dietary changes. This may include developing a healthy meal plan.  · Regular physical activity. This may include activity that causes your heart to beat faster (aerobic exercise) and strength training. Work with your health care provider to design an exercise program that works for you.  · Medicine to help you lose weight if you are unable to lose 1 pound a week after 6 weeks of healthy eating and more physical activity.  · Treating conditions that cause the obesity (underlying conditions).  · Surgery. Surgical options may include gastric banding and gastric bypass. Surgery may be done if:  ? Other treatments have not helped to improve your condition.  ? You have a BMI of 40 or higher.  ? You have life-threatening health problems related to obesity.  Follow these instructions at home:  Eating and drinking    · Follow recommendations from your health care provider about what you eat and drink. Your health care provider may advise you to:  ? Limit fast food, sweets, and processed snack foods.  ? Choose low-fat options, such as low-fat milk instead of whole milk.  ? Eat 5 or more servings of fruits or vegetables every day.  ? Eat at home more often. This gives you more control over what you eat.  ? Choose healthy foods when you eat out.  ? Learn to read food labels. This will help you understand how much food is considered 1 serving.  ? Learn what a healthy serving size is.  ? Keep low-fat snacks available.  ? Limit sugary drinks, such as soda, fruit juice, sweetened iced tea, and flavored milk.  · Drink enough water to keep your urine pale yellow.  · Do not  follow a fad diet. Fad diets can be unhealthy and even dangerous.  Physical activity  · Exercise regularly, as told by your health care provider.  ? Most adults should get up to 150 minutes of moderate-intensity exercise every week.  ? Ask your health care provider what types of exercise are safe for you and how often you should exercise.  · Warm up and stretch before being active.  · Cool down and stretch after being active.  · Rest between periods of activity.  Lifestyle  · Work with your health care provider and a dietitian to set a weight-loss goal that is healthy and reasonable for you.  · Limit your screen time.  · Find ways to reward yourself that do not involve food.  · Do not drink alcohol if:  ? Your health care provider tells you not to drink.  ? You are pregnant, may be pregnant, or are planning to become pregnant.  · If you drink alcohol:  ? Limit how much you use to:  § 0-1 drink a day for women.  § 0-2 drinks a day for men.  ? Be aware of how much alcohol is in your drink. In the [place] one drink equals one 12 oz bottle of beer (355 mL), one 5 oz glass of wine (148 mL), or one 1½ oz glass of hard liquor (44 mL).  General instructions  · Keep a weight-loss journal to keep track of the food you eat and how much exercise you get.  · Take over-the-counter and prescription medicines only as told by your health care provider.  · Take vitamins and supplements only as told by your health care provider.  · Consider joining a support group. Your health care provider may be able to recommend a support group.  · Keep all follow-up visits as told by your health care provider. This is important.  Contact a health care provider if:  · You are unable to meet your weight loss goal after 6 weeks of dietary and lifestyle changes.  Get help right away if you are having:  · Trouble breathing.  · Suicidal thoughts or behaviors.  Summary  · Obesity is the condition of having too much total body fat.  · Being overweight or  obese means that your weight is greater than what is considered healthy for your body size.  · Work with your health care provider and a dietitian to set a weight-loss goal that is healthy and reasonable for you.  · Exercise regularly, as told by your health care provider. Ask your health care provider what types of exercise are safe for you and how often you should exercise.  This information is not intended to replace advice given to you by your health care provider. Make sure you discuss any questions you have with your health care provider.  Document Revised: 08/22/2019 Document Reviewed: 08/22/2019  Elsevier Patient Education © 2021 [employer]

## 2021-07-19 ENCOUNTER — LAB (OUTPATIENT)
Dept: LAB | Facility: HOSPITAL | Age: 66
End: 2021-07-19

## 2021-07-19 ENCOUNTER — TELEPHONE (OUTPATIENT)
Dept: FAMILY MEDICINE CLINIC | Facility: CLINIC | Age: 66
End: 2021-07-19

## 2021-07-19 DIAGNOSIS — E29.1 HYPOGONADISM IN MALE: ICD-10-CM

## 2021-07-19 DIAGNOSIS — E78.49 OTHER HYPERLIPIDEMIA: ICD-10-CM

## 2021-07-19 DIAGNOSIS — Z12.5 SCREENING FOR PROSTATE CANCER: ICD-10-CM

## 2021-07-19 DIAGNOSIS — Z00.00 ANNUAL PHYSICAL EXAM: ICD-10-CM

## 2021-07-19 DIAGNOSIS — I10 ESSENTIAL HYPERTENSION: ICD-10-CM

## 2021-07-19 DIAGNOSIS — E11.43 TYPE 2 DIABETES MELLITUS WITH DIABETIC AUTONOMIC NEUROPATHY, WITHOUT LONG-TERM CURRENT USE OF INSULIN (HCC): ICD-10-CM

## 2021-07-19 LAB
ALBUMIN SERPL-MCNC: 4.7 G/DL (ref 3.5–5.2)
ALBUMIN UR-MCNC: <1.2 MG/DL
ALBUMIN/GLOB SERPL: 1.9 G/DL
ALP SERPL-CCNC: 44 U/L (ref 39–117)
ALT SERPL W P-5'-P-CCNC: 46 U/L (ref 1–41)
ANION GAP SERPL CALCULATED.3IONS-SCNC: 10.4 MMOL/L (ref 5–15)
AST SERPL-CCNC: 53 U/L (ref 1–40)
BASOPHILS # BLD AUTO: 0.05 10*3/MM3 (ref 0–0.2)
BASOPHILS NFR BLD AUTO: 1.1 % (ref 0–1.5)
BILIRUB SERPL-MCNC: 0.7 MG/DL (ref 0–1.2)
BUN SERPL-MCNC: 17 MG/DL (ref 8–23)
BUN/CREAT SERPL: 15.7 (ref 7–25)
CALCIUM SPEC-SCNC: 9.5 MG/DL (ref 8.6–10.5)
CHLORIDE SERPL-SCNC: 100 MMOL/L (ref 98–107)
CHOLEST SERPL-MCNC: 176 MG/DL (ref 0–200)
CO2 SERPL-SCNC: 27.6 MMOL/L (ref 22–29)
CREAT SERPL-MCNC: 1.08 MG/DL (ref 0.76–1.27)
DEPRECATED RDW RBC AUTO: 45.9 FL (ref 37–54)
EOSINOPHIL # BLD AUTO: 0.11 10*3/MM3 (ref 0–0.4)
EOSINOPHIL NFR BLD AUTO: 2.3 % (ref 0.3–6.2)
ERYTHROCYTE [DISTWIDTH] IN BLOOD BY AUTOMATED COUNT: 13.7 % (ref 12.3–15.4)
GFR SERPL CREATININE-BSD FRML MDRD: 69 ML/MIN/1.73
GLOBULIN UR ELPH-MCNC: 2.5 GM/DL
GLUCOSE SERPL-MCNC: 245 MG/DL (ref 65–99)
HBA1C MFR BLD: 8.84 % (ref 4.8–5.6)
HCT VFR BLD AUTO: 47.2 % (ref 37.5–51)
HDLC SERPL-MCNC: 34 MG/DL (ref 40–60)
HGB BLD-MCNC: 15.2 G/DL (ref 13–17.7)
LDLC SERPL CALC-MCNC: 82 MG/DL (ref 0–100)
LDLC/HDLC SERPL: 1.99 {RATIO}
LYMPHOCYTES # BLD AUTO: 1.27 10*3/MM3 (ref 0.7–3.1)
LYMPHOCYTES NFR BLD AUTO: 27.1 % (ref 19.6–45.3)
MCH RBC QN AUTO: 29.2 PG (ref 26.6–33)
MCHC RBC AUTO-ENTMCNC: 32.2 G/DL (ref 31.5–35.7)
MCV RBC AUTO: 90.6 FL (ref 79–97)
MONOCYTES # BLD AUTO: 0.36 10*3/MM3 (ref 0.1–0.9)
MONOCYTES NFR BLD AUTO: 7.7 % (ref 5–12)
NEUTROPHILS NFR BLD AUTO: 2.88 10*3/MM3 (ref 1.7–7)
NEUTROPHILS NFR BLD AUTO: 61.4 % (ref 42.7–76)
PLATELET # BLD AUTO: 138 10*3/MM3 (ref 140–450)
PMV BLD AUTO: 10.8 FL (ref 6–12)
POTASSIUM SERPL-SCNC: 4.7 MMOL/L (ref 3.5–5.2)
PROT SERPL-MCNC: 7.2 G/DL (ref 6–8.5)
PSA SERPL-MCNC: <0.014 NG/ML (ref 0–4)
RBC # BLD AUTO: 5.21 10*6/MM3 (ref 4.14–5.8)
SODIUM SERPL-SCNC: 138 MMOL/L (ref 136–145)
TRIGL SERPL-MCNC: 372 MG/DL (ref 0–150)
TSH SERPL DL<=0.05 MIU/L-ACNC: 2.37 UIU/ML (ref 0.27–4.2)
VLDLC SERPL-MCNC: 60 MG/DL (ref 5–40)
WBC # BLD AUTO: 4.69 10*3/MM3 (ref 3.4–10.8)

## 2021-07-19 PROCEDURE — 84403 ASSAY OF TOTAL TESTOSTERONE: CPT

## 2021-07-19 PROCEDURE — 84443 ASSAY THYROID STIM HORMONE: CPT

## 2021-07-19 PROCEDURE — 84402 ASSAY OF FREE TESTOSTERONE: CPT

## 2021-07-19 PROCEDURE — 83036 HEMOGLOBIN GLYCOSYLATED A1C: CPT

## 2021-07-19 PROCEDURE — 36415 COLL VENOUS BLD VENIPUNCTURE: CPT

## 2021-07-19 PROCEDURE — G0103 PSA SCREENING: HCPCS

## 2021-07-19 PROCEDURE — 85025 COMPLETE CBC W/AUTO DIFF WBC: CPT

## 2021-07-19 PROCEDURE — 80061 LIPID PANEL: CPT

## 2021-07-19 PROCEDURE — 82043 UR ALBUMIN QUANTITATIVE: CPT

## 2021-07-19 PROCEDURE — 80053 COMPREHEN METABOLIC PANEL: CPT

## 2021-07-19 NOTE — TELEPHONE ENCOUNTER
Caller: Chaparro Weber    Relationship: Self    Best call back number: [phone]    What is the medical concern/diagnosis: BUMP ON NECK, JAW PAIN    What specialty or service is being requested: ENT DRWilfred     What is the provider, practice or medical service name: [facility]

## 2021-07-23 ENCOUNTER — TELEPHONE (OUTPATIENT)
Dept: FAMILY MEDICINE CLINIC | Facility: CLINIC | Age: 66
End: 2021-07-23

## 2021-07-23 DIAGNOSIS — R68.84 JAW PAIN: ICD-10-CM

## 2021-07-23 DIAGNOSIS — L98.9 LESION OF NECK: Primary | ICD-10-CM

## 2021-07-23 LAB
TESTOST FREE SERPL-MCNC: 0.8 PG/ML (ref 6.6–18.1)
TESTOST SERPL-MCNC: 41.7 NG/DL (ref 264–916)

## 2021-07-28 ENCOUNTER — TELEPHONE (OUTPATIENT)
Dept: FAMILY MEDICINE CLINIC | Facility: CLINIC | Age: 66
End: 2021-07-28

## 2021-07-28 NOTE — TELEPHONE ENCOUNTER
----- Message from Charlotte Kulkarni DO sent at 7/19/2021  7:21 PM EDT -----  1. Normal PSA, thyroid level, kidney function , electrolyte levels, LDL, white blood cell count, micro-albumin level and hgb.   2. Elevated A1c at (8.84) which is down from (9.2) 5 months ago.   3. Elevated triglyceride level at 372. I recommend adding fish oil daily.

## 2021-07-28 NOTE — TELEPHONE ENCOUNTER
----- Message from Charlotet Kulkarni, DO sent at 7/23/2021  6:14 PM EDT -----  Please call and let the patient know that his testosterone level is still low.  And normal total testosterone level is 264 and his is 41.7.  A normal free testosterone level is 6.6 and his is 0.8.  The patient was wanting these numbers to take with him to his appointment to a specialist.  Please print them off a copy if he would like.

## 2021-07-30 RX ORDER — METHYLPREDNISOLONE 4 MG
TABLET, DOSE PACK ORAL
COMMUNITY
End: 2022-07-25

## 2021-07-30 RX ORDER — ICOSAPENT ETHYL 1000 MG/1
CAPSULE ORAL
COMMUNITY
End: 2022-07-25

## 2021-07-30 RX ORDER — SITAGLIPTIN AND METFORMIN HYDROCHLORIDE 1000; 100 MG/1; MG/1
TABLET, FILM COATED, EXTENDED RELEASE ORAL
COMMUNITY
End: 2022-01-27

## 2021-07-30 RX ORDER — CANAGLIFLOZIN 300 MG/1
TABLET, FILM COATED ORAL
COMMUNITY
End: 2022-01-27

## 2021-07-30 RX ORDER — DULAGLUTIDE 0.75 MG/.5ML
INJECTION, SOLUTION SUBCUTANEOUS
COMMUNITY
End: 2021-10-25

## 2021-07-30 RX ORDER — PSYLLIUM HUSK 0.4 G
CAPSULE ORAL
COMMUNITY

## 2021-08-05 ENCOUNTER — TELEPHONE (OUTPATIENT)
Dept: FAMILY MEDICINE CLINIC | Facility: CLINIC | Age: 66
End: 2021-08-05

## 2021-08-09 ENCOUNTER — OFFICE VISIT (OUTPATIENT)
Dept: OTOLARYNGOLOGY | Facility: CLINIC | Age: 66
End: 2021-08-09

## 2021-08-09 VITALS — WEIGHT: 258 LBS | TEMPERATURE: 96.9 F | BODY MASS INDEX: 33.11 KG/M2 | HEIGHT: 74 IN

## 2021-08-09 DIAGNOSIS — R22.1 NECK MASS: Primary | ICD-10-CM

## 2021-08-09 PROCEDURE — 99203 OFFICE O/P NEW LOW 30 MIN: CPT | Performed by: OTOLARYNGOLOGY

## 2021-08-09 NOTE — PROGRESS NOTES
"Patient Name: Chaparro Weber   Visit Date: 08/09/2021   Patient ID: [id number]  Provider: Sam Lo MD    Sex: male  [facility]   YOB: 1955  Location Address: [address] [place] [address], [address] [place] [address],?[id number]    Primary Care Provider Charlotte Kulkarni DO  Location Phone: [phone]    Referring Provider: Charlotte Kulkarni DO        Chief Complaint  Neck Mass    History of Present Illness  Chaparro Weber is a 65 y.o. male who presents to [facility] today as a consult from Charlotte Kulkarni DO for evaluation of his neck.  He tells me that around 1 year ago he noticed a \"knot\" on the right side of his neck.  He feels as though it has increased in size slightly since he originally noticed it.  He is not causing him any pain or affecting the range of motion of his neck.  He denies any history of skin irritation involving his head or neck or drainage from the lesion.  He denies any history of skin cancer.  He has not had any issues with fever, chills, night sweats, or unintentional weight loss.  As an aside he mentions a right nasal alar cyst which was removed a few years ago by an ENT in [place].  He feels like it is since recurred and bothersome when he wipes his nose.        Past Medical History:   Diagnosis Date   • Anxiety    • Arthritis    • Asthma    • Chest pain    • CHF (congestive heart failure) (CMS/[facility], located within [facility])    • Condition not found     Hernia-Unspecified   • COPD (chronic obstructive pulmonary disease) (CMS/HCC)    • Deafness    • Diabetes (CMS/HCC)    • HBP (high blood pressure)    • Heart disease    • High cholesterol    • Kidney stones    • Lung disease    • Night sweats    • Ringing in ear    • Type 2 diabetes mellitus with autonomic neuropathy (CMS/HCC) 7/16/2021       Past Surgical History:   Procedure Laterality Date   • APPENDECTOMY     • BACK SURGERY  2000,2010    x2   • COLONOSCOPY     • GALLBLADDER SURGERY "  2016   • KIDNEY SURGERY     • NECK SURGERY  2005   • PACEMAKER IMPLANTATION           Current Outpatient Medications:   •  aspirin EC (aspirin) 325 MG tablet, Take 325 mg by mouth Daily., Disp: , Rfl:   •  cholecalciferol (VITAMIN D3) 25 MCG (1000 UT) tablet, Take 2,000 Units by mouth Daily., Disp: , Rfl:   •  Diclofenac Sodium (VOLTAREN) 1 % gel gel, , Disp: , Rfl:   •  Empagliflozin-metFORMIN HCl ER (Synjardy XR) 12.5-1000 MG tablet sustained-release 24 hour, Synjardy XR 12.5 mg-1,000 mg tablet, extended release, Disp: , Rfl:   •  escitalopram (LEXAPRO) 20 MG tablet, , Disp: , Rfl:   •  fenofibrate (TRICOR) 145 MG tablet, , Disp: , Rfl:   •  HYDROcodone-acetaminophen (NORCO) 7.5-325 MG per tablet, hydrocodone 7.5 mg-acetaminophen 325 mg tablet, Disp: , Rfl:   •  ibuprofen (ADVIL,MOTRIN) 200 MG tablet, Take 200 mg by mouth Every 6 (Six) Hours As Needed for Mild Pain ., Disp: , Rfl:   •  Inulin (FIBER CHOICE PO), Take  by mouth., Disp: , Rfl:   •  metoprolol succinate XL (TOPROL-XL) 25 MG 24 hr tablet, metoprolol succinate ER 25 mg tablet,extended release 24 hr  TAKE 1 TABLET BY MOUTH EVERY DAY, Disp: , Rfl:   •  nateglinide (STARLIX) 60 MG tablet, nateglinide 60 mg tablet, Disp: , Rfl:   •  pregabalin (LYRICA) 300 MG capsule, , Disp: , Rfl:   •  rosuvastatin (CRESTOR) 5 MG tablet, , Disp: , Rfl:   •  valsartan (DIOVAN) 160 MG tablet, valsartan 160 mg tablet  TAKE 1 TABLET BY MOUTH DAILY, Disp: , Rfl:   •  vitamin B-12 (CYANOCOBALAMIN) 1000 MCG tablet, Take 1,200 mcg by mouth Daily., Disp: , Rfl:   •  vitamin E 100 UNIT capsule, Take 100 Units by mouth Daily., Disp: , Rfl:   •  Zinc 50 MG capsule, Take  by mouth., Disp: , Rfl:   •  Canagliflozin (Invokana) 300 MG tablet tablet, , Disp: , Rfl:   •  Dulaglutide (Trulicity) 0.75 MG/0.5ML solution pen-injector, Trulicity 0.75 mg/0.5 mL subcutaneous pen injector, Disp: , Rfl:   •  empagliflozin (Jardiance) 25 MG tablet tablet, Jardiance 25 mg oral tablet take 1 tablet  "(25 mg) by oral route once daily in the morning   Suspended, Disp: , Rfl:   •  Glucosamine Sulfate 500 MG tablet, , Disp: , Rfl:   •  icosapent ethyl (Vascepa) 1 g capsule capsule, , Disp: , Rfl:   •  linagliptin (Tradjenta) 5 MG tablet tablet, Tradjenta 5 mg oral tablet take 1 tablet (5 mg) by oral route once daily   Suspended, Disp: , Rfl:   •  methylcellulose, Laxative, (Citrucel) 500 MG tablet tablet, Citrucel 500 mg oral tablet take 1 tablet by oral route daily   Active, Disp: , Rfl:   •  psyllium (Metamucil) 0.52 g capsule, , Disp: , Rfl:   •  SITagliptin-metFORMIN HCl ER (Janumet XR) 100-1000 MG tablet, , Disp: , Rfl:      Allergies   Allergen Reactions   • Codeine Nausea Only and Nausea And Vomiting       Social History     Tobacco Use   • Smoking status: Former Smoker     Packs/day: 1.50     Quit date:      Years since quittin.6   • Smokeless tobacco: Never Used   Substance Use Topics   • Alcohol use: Never   • Drug use: Not on file        Objective     Vital Signs:   Temp 96.9 °F (36.1 °C) (Temporal)   Ht 188 cm (74\")   Wt 117 kg (258 lb)   BMI 33.13 kg/m²       Physical Exam    General: Well developed, well nourished patient of stated age in no acute distress. Voice is strong and clear.   Head: Normocephalic and atraumatic.  Face: No lesions.  Bilateral parotid and submandibular glands are unremarkable.  Stensen's and Warthin's ducts are productive of clear saliva bilaterally.  House-Brackmann I/VI     bilaterally.   muscles and temporomandibular joint nontender to palpation.  No TMJ crepitus.  Eyes: PERRLA, sclerae anicteric, no conjunctival injection. Extra ocular movements are intact and full. No nystagmus.   Ears: Auricles are normal in appearance. Bilateral external auditory canals are unremarkable. Bilateral tympanic membranes are clear and without effusion. Hearing normal to conversational voice.   Nose: External nose is normal in appearance. Bilateral nares are patent with " normal appearing mucosa.  There is a small subcutaneous cyst present on the right anterior septum.  Septum midline. Turbinates are unremarkable. No lesions.   Oral Cavity: Lips are normal in appearance. Oral mucosa is unremarkable. Gingiva is unremarkable. Normal dentition for age. Tongue is unremarkable with good movement. Hard palate is unremarkable.   Oropharynx: Soft palate is unremarkable with full movement. Uvula is unremarkable. Bilateral tonsils are unremarkable. Posterior oropharynx is unremarkable.    Larynx and hypopharynx: Deferred secondary to gag reflex.  Neck: Supple.  Right level V 0.7 cm  mass which is firm, superficial, and nontender to palpation.  No obvious punctum.  Trachea midline. Thyroid normal size and without nodules to palpation.   Lymphatic: No lymphadenopathy upon palpation.  Respiratory: Clear to auscultation bilaterally, nonlabored respirations    Cardiovascular: RRR, no murmurs, rubs, or gallops,   Psychiatric: Appropriate affect, cooperative   Neurologic: Oriented x 3, strength symmetric in all extremities, Cranial Nerves II-XII are grossly intact to confrontation   Skin: Warm and dry. No rashes.    Procedures           Result Review :               Assessment and Plan    Diagnoses and all orders for this visit:    1. Neck mass (Primary)    Impressions and findings were discussed.  Currently, he has an approximately 0.7 cm superficial right level 5 mass which is firm, nontender, and mobile to palpation.  We discussed the differential including, infectious, inflammatory, congenital, and neoplastic possibilities.  This does not seem consistent with a typical lymph node although occasionally they can be quite superficial and level 5.  Options for further evaluation management were discussed including continued observation versus imaging with a CT scan versus excisional biopsy.  After thorough discussion we will continue with observation and he will follow up in 3 months or sooner if  needed.        Follow Up   Return in about 3 months (around 11/9/2021).  Patient was given instructions and counseling regarding his condition or for health maintenance advice. Please see specific information pulled into the AVS if appropriate.

## 2021-09-17 RX ORDER — ROSUVASTATIN CALCIUM 5 MG/1
TABLET, COATED ORAL
Qty: 30 TABLET | Refills: 2 | Status: SHIPPED | OUTPATIENT
Start: 2021-09-17 | End: 2022-01-27 | Stop reason: SINTOL

## 2021-09-17 NOTE — TELEPHONE ENCOUNTER
Caller: Chaparro Weber    Relationship: Self    Best call back number: [phone]    What is the best time to reach you: ANYTIME    Who are you requesting to speak with (clinical staff, provider,  specific staff member): CLINICAL STAFF/ REFERRAL DEPARTMENT      What was the call regarding: PATIENT HAS CALLED TO CHECK THE STATUS OF REFERRAL TO ENT. PATIENT EXPRESSED HE WAS NEEDING THIS AS SOON AS POSSIBLE.     Do you require a callback: YES, PATIENT IS WANTING TO BE ADVISED ON THIS REFERRAL PROCESS.              The patient was self-referred. A copy of this note will be sent to the referring physician.  There is no recent history of rectal bleeding. The patient has no pertinent additional complaints of abdominal pain, , diarrhea, bloating, rectal pain, anorexia or unintentional weight loss.  She has suffered from chronic constipation. She eats 5 prunes daily and feels that this is keeping things under control.  Regarding any upper GI complaints, the patient has not had heartburn, nausea, vomiting or dysphagia.  She was told by Dr. vasquez she did not have BE. She has a remote history of PUD.   The patient does not take blood thinners.  She has had spells of passing out.  She was seen by the cardiologist. A nuclear stress test was done on 7/14/21 which was negative. She also had Carotid artery evaluation, ECHO and Holter monitor- all which were  unremarkable.  She had a "scan of her throat" ordered by Dr. Yusra Carbone a few months ago.  The patient last had a colonoscopy in 2015. She recalls being told she had colon polyps. There is no FH of colon cancer or colon polyps.   She is on Pantroprazole.  She follows with Dr. Moura for CLAYTON. She uses a CPAP. She is not on Symbicort anymore. She follows with him every 6 months.    Daughter is my patient Alea Darling (adopted).  Summary of prior workup: - Report from 1217.  Chronic esophagitis and carditis.  Reactive gastropathy. - E/C on 10/3/15: Report not available. - CT abdomen pelvis on 2016: Diverticulosis without diverticulitis.  No acute abnormality noted. - Labs in 2015.  Amylase 151, lipase 55.  Normal liver enzymes.

## 2021-10-04 RX ORDER — ESCITALOPRAM OXALATE 20 MG/1
TABLET ORAL
Qty: 90 TABLET | Refills: 1 | Status: SHIPPED | OUTPATIENT
Start: 2021-10-04 | End: 2022-04-18

## 2021-10-25 ENCOUNTER — OFFICE VISIT (OUTPATIENT)
Dept: FAMILY MEDICINE CLINIC | Facility: CLINIC | Age: 66
End: 2021-10-25

## 2021-10-25 VITALS
WEIGHT: 247.5 LBS | OXYGEN SATURATION: 94 % | HEIGHT: 73 IN | SYSTOLIC BLOOD PRESSURE: 111 MMHG | BODY MASS INDEX: 32.8 KG/M2 | TEMPERATURE: 97.4 F | HEART RATE: 79 BPM | RESPIRATION RATE: 18 BRPM | DIASTOLIC BLOOD PRESSURE: 67 MMHG

## 2021-10-25 DIAGNOSIS — I10 ESSENTIAL HYPERTENSION: Chronic | ICD-10-CM

## 2021-10-25 DIAGNOSIS — E78.2 MIXED HYPERLIPIDEMIA: ICD-10-CM

## 2021-10-25 DIAGNOSIS — E66.09 CLASS 1 OBESITY DUE TO EXCESS CALORIES WITH SERIOUS COMORBIDITY AND BODY MASS INDEX (BMI) OF 33.0 TO 33.9 IN ADULT: Chronic | ICD-10-CM

## 2021-10-25 DIAGNOSIS — E11.43 TYPE 2 DIABETES MELLITUS WITH DIABETIC AUTONOMIC NEUROPATHY, WITHOUT LONG-TERM CURRENT USE OF INSULIN (HCC): Primary | Chronic | ICD-10-CM

## 2021-10-25 PROBLEM — E66.811 CLASS 1 OBESITY DUE TO EXCESS CALORIES WITH SERIOUS COMORBIDITY AND BODY MASS INDEX (BMI) OF 33.0 TO 33.9 IN ADULT: Chronic | Status: ACTIVE | Noted: 2021-10-25

## 2021-10-25 PROBLEM — E78.5 HYPERLIPIDEMIA: Chronic | Status: ACTIVE | Noted: 2021-07-16

## 2021-10-25 PROCEDURE — 99214 OFFICE O/P EST MOD 30 MIN: CPT | Performed by: FAMILY MEDICINE

## 2021-10-25 NOTE — ASSESSMENT & PLAN NOTE
Patient's (Body mass index is 32.65 kg/m².) indicates that they are obese (BMI >30) with health conditions that include hypertension, diabetes mellitus and dyslipidemias . Weight is improving with lifestyle modifications. BMI is is above average; BMI management plan is completed. We discussed low calorie, low carb based diet program, portion control and increasing exercise.

## 2021-10-25 NOTE — PROGRESS NOTES
"Chief Complaint  Follow-up (3mo ) for diabetes    Subjective          Chaparro Weber presents to [facility]    He is here today for the management of his chronic medical conditions. He has a past medical history significant for asthma, COPD, congestive heart failure, chronic back pain managed by pain management, CAD with pace maker, diabetes, hypertension, very low testosterone levels, hyperlipidemia, kidney stones.  He has a family history of coronary disease and colon cancer and his dad  at 74.  He is previous smoker and smoked for approximately 12 years.  He denies alcohol use.    He has lost 11 lbs over the last two months.  He is exercising 3 days a week playing [employer].    He says when he checks his blood sugar it ranges between 150 and 250.  He was prescribed Trulicity at his last visit but was not able to afford it.  He says he wants to stick with oral diabetic medications.    The patient has no other complaints today and denies chest pain, shortness of breath, weakness, numbness, nausea, vomiting, diarrhea, dizziness or syncopal event.            Objective   Vital Signs:   /67 (BP Location: Left arm, Patient Position: Sitting)   Pulse 79   Temp 97.4 °F (36.3 °C)   Resp 18   Ht 185.4 cm (73\")   Wt 112 kg (247 lb 8 oz)   SpO2 94%   BMI 32.65 kg/m²     Physical Exam  Vitals reviewed.   Constitutional:       Appearance: Normal appearance. He is well-developed. He is obese.   HENT:      Head: Normocephalic and atraumatic.      Right Ear: External ear normal.      Left Ear: External ear normal.      Mouth/Throat:      Pharynx: No oropharyngeal exudate.   Eyes:      Conjunctiva/sclera: Conjunctivae normal.      Pupils: Pupils are equal, round, and reactive to light.   Neck:      Vascular: No carotid bruit.   Cardiovascular:      Rate and Rhythm: Normal rate and regular rhythm.      Heart sounds: No murmur heard.  No friction rub. No gallop.    Pulmonary: "      Effort: Pulmonary effort is normal.      Breath sounds: Normal breath sounds. No wheezing or rhonchi.   Abdominal:      General: Bowel sounds are normal. There is no distension.      Palpations: Abdomen is soft.      Tenderness: There is no abdominal tenderness.   Skin:     General: Skin is warm and dry.   Neurological:      Mental Status: He is alert and oriented to person, place, and time.      Cranial Nerves: No cranial nerve deficit.      Motor: No weakness.   Psychiatric:         Mood and Affect: Mood and affect normal.         Behavior: Behavior normal.         Thought Content: Thought content normal.         Judgment: Judgment normal.        Result Review :     CMP    CMP 7/19/21   Glucose 245 (A)   BUN 17   Creatinine 1.08   eGFR Non African Am 69   Sodium 138   Potassium 4.7   Chloride 100   Calcium 9.5   Albumin 4.70   Total Bilirubin 0.7   Alkaline Phosphatase 44   AST (SGOT) 53 (A)   ALT (SGPT) 46 (A)   (A) Abnormal value       Comments are available for some flowsheets but are not being displayed.           CBC    CBC 7/19/21   WBC 4.69   RBC 5.21   Hemoglobin 15.2   Hematocrit 47.2   MCV 90.6   MCH 29.2   MCHC 32.2   RDW 13.7   Platelets 138 (A)   (A) Abnormal value            Lipid Panel    Lipid Panel 1/29/21 1/29/21 7/19/21    0956 [id number]    Total Cholesterol   176   Total Cholesterol 169     Triglycerides 586 (A)  372 (A)   HDL Cholesterol 28 (A)  34 (A)   VLDL Cholesterol   60 (A)   LDL Cholesterol   32 (A) 82   LDL/HDL Ratio   1.99   (A) Abnormal value       Comments are available for some flowsheets but are not being displayed.           TSH    TSH 7/19/21   TSH 2.370                     Assessment and Plan    Diagnoses and all orders for this visit:    1. Type 2 diabetes mellitus with diabetic autonomic neuropathy, without long-term current use of insulin (HCC) (Primary)  Assessment & Plan:  Diabetes is improving with treatment.   Continue current treatment regimen.  Diabetes will be  reassessed in 3 months.    Orders:  -     Hemoglobin A1c; Future  -     Lipid panel; Future    2. Class 1 obesity due to excess calories with serious comorbidity and body mass index (BMI) of 33.0 to 33.9 in adult  Assessment & Plan:  Patient's (Body mass index is 32.65 kg/m².) indicates that they are obese (BMI >30) with health conditions that include hypertension, diabetes mellitus and dyslipidemias . Weight is improving with lifestyle modifications. BMI is is above average; BMI management plan is completed. We discussed low calorie, low carb based diet program, portion control and increasing exercise.       3. Essential hypertension  Assessment & Plan:  Hypertension is improving with treatment.  Continue current treatment regimen.  Dietary sodium restriction.  Weight loss.  Blood pressure will be reassessed at the next regular appointment.      4. Mixed hyperlipidemia  Assessment & Plan:  Lipid abnormalities are improving with treatment.  Nutritional counseling was provided. and Pharmacotherapy as ordered.  Lipids will be reassessed in 3 months.    Orders:  -     Lipid panel; Future      Follow Up   Return in about 3 months (around 1/25/2022).  Patient was given instructions and counseling regarding his condition or for health maintenance advice. Please see specific information pulled into the AVS if appropriate.

## 2021-10-26 RX ORDER — EMPAGLIFLOZIN, METFORMIN HYDROCHLORIDE 12.5; 1 MG/1; MG/1
TABLET, EXTENDED RELEASE ORAL
Qty: 60 TABLET | Refills: 0 | Status: SHIPPED | OUTPATIENT
Start: 2021-10-26 | End: 2021-11-22

## 2021-11-10 ENCOUNTER — OFFICE VISIT (OUTPATIENT)
Dept: OTOLARYNGOLOGY | Facility: CLINIC | Age: 66
End: 2021-11-10

## 2021-11-10 VITALS — TEMPERATURE: 96.9 F | BODY MASS INDEX: 33.8 KG/M2 | HEIGHT: 73 IN | WEIGHT: 255 LBS

## 2021-11-10 DIAGNOSIS — R22.1 NECK MASS: Primary | ICD-10-CM

## 2021-11-10 PROCEDURE — 88304 TISSUE EXAM BY PATHOLOGIST: CPT | Performed by: OTOLARYNGOLOGY

## 2021-11-10 PROCEDURE — 99212 OFFICE O/P EST SF 10 MIN: CPT | Performed by: OTOLARYNGOLOGY

## 2021-11-10 PROCEDURE — 11421 EXC H-F-NK-SP B9+MARG 0.6-1: CPT | Performed by: OTOLARYNGOLOGY

## 2021-11-10 NOTE — PROGRESS NOTES
"Patient Name: Chaparro Weber   Visit Date: 11/10/2021   Patient ID: [id number]  Provider: Sam Lo MD    Sex: male  Location: [facility]   YOB: 1955  Location Address: [address] [place] [address], [address] [place] [address],?[place]?[id number]    Primary Care Provider Claudia KulkarniryDO  Location Phone: [phone]    Referring Provider: No ref. provider found        Chief Complaint  No chief complaint on file.    History of Present Illness  Chaparro Weber is a 66 y.o. male who returns today for follow-up of a right level 5 neck mass.  He was originally seen on 8/9/2021 which time he reported noticing the \"knot\" on the right side of his neck for around 1 year.  He felt as though it had slightly increased in size but was not causing him any symptoms.  He denied any fevers, chills, night sweats, or unintentional weight loss.  He also mentioned a right nasal alar cyst which was removed a few years ago by an ENT in [place].  He felt as though it had recurred and was bothersome when he wipes his nose.  Examination that day revealed a 0.7 cm superficial right level 5 mass which was firm, nontender, and mobile to palpation.  He elected to pursue continued observation.    He returns today for follow-up. No major changes in the right sided neck mass. Rare \"twinge\" of pain.  He would like to have this removed.      Past Medical History:   Diagnosis Date   • Anxiety    • Arthritis    • Asthma    • Chest pain    • CHF (congestive heart failure) ([facility])    • Condition not found     Hernia-Unspecified   • COPD (chronic obstructive pulmonary disease) ([facility])    • Deafness    • Diabetes ([facility])    • HBP (high blood pressure)    • Heart disease    • High cholesterol    • Kidney stones    • Lung disease    • Night sweats    • Ringing in ear    • Type 2 diabetes mellitus with autonomic neuropathy ([facility]) 7/16/2021       Past Surgical History:   Procedure Laterality Date   • APPENDECTOMY     • BACK SURGERY  " 2000,2010    x2   • COLONOSCOPY     • GALLBLADDER SURGERY  2016   • KIDNEY SURGERY     • NECK SURGERY  2005   • PACEMAKER IMPLANTATION           Current Outpatient Medications:   •  aspirin EC (aspirin) 325 MG tablet, Take 325 mg by mouth Daily., Disp: , Rfl:   •  Canagliflozin (Invokana) 300 MG tablet tablet, , Disp: , Rfl:   •  cholecalciferol (VITAMIN D3) 25 MCG (1000 UT) tablet, Take 2,000 Units by mouth Daily., Disp: , Rfl:   •  Diclofenac Sodium (VOLTAREN) 1 % gel gel, , Disp: , Rfl:   •  empagliflozin (Jardiance) 25 MG tablet tablet, Jardiance 25 mg oral tablet take 1 tablet (25 mg) by oral route once daily in the morning   Suspended, Disp: , Rfl:   •  escitalopram (LEXAPRO) 20 MG tablet, TAKE ONE TABLET BY MOUTH ONCE DAILY, Disp: 90 tablet, Rfl: 1  •  fenofibrate (TRICOR) 145 MG tablet, , Disp: , Rfl:   •  Glucosamine Sulfate 500 MG tablet, , Disp: , Rfl:   •  HYDROcodone-acetaminophen (NORCO) 7.5-325 MG per tablet, hydrocodone 7.5 mg-acetaminophen 325 mg tablet, Disp: , Rfl:   •  ibuprofen (ADVIL,MOTRIN) 200 MG tablet, Take 200 mg by mouth Every 6 (Six) Hours As Needed for Mild Pain ., Disp: , Rfl:   •  icosapent ethyl (Vascepa) 1 g capsule capsule, , Disp: , Rfl:   •  Inulin (FIBER CHOICE PO), Take  by mouth., Disp: , Rfl:   •  linagliptin (Tradjenta) 5 MG tablet tablet, Tradjenta 5 mg oral tablet take 1 tablet (5 mg) by oral route once daily   Suspended, Disp: , Rfl:   •  methylcellulose, Laxative, (Citrucel) 500 MG tablet tablet, Citrucel 500 mg oral tablet take 1 tablet by oral route daily   Active, Disp: , Rfl:   •  metoprolol succinate XL (TOPROL-XL) 25 MG 24 hr tablet, metoprolol succinate ER 25 mg tablet,extended release 24 hr  TAKE 1 TABLET BY MOUTH EVERY DAY, Disp: , Rfl:   •  nateglinide (STARLIX) 60 MG tablet, nateglinide 60 mg tablet, Disp: , Rfl:   •  pregabalin (LYRICA) 300 MG capsule, , Disp: , Rfl:   •  psyllium (Metamucil) 0.52 g capsule, , Disp: , Rfl:   •  rosuvastatin (CRESTOR) 5 MG  tablet, TAKE ONE TABLET AT BEDTIME, Disp: 30 tablet, Rfl: 2  •  SITagliptin-metFORMIN HCl ER (Janumet XR) 100-1000 MG tablet, , Disp: , Rfl:   •  Synjardy XR 12.5-1000 MG tablet sustained-release 24 hour, TAKE TWO TABLETS EVERY MORNING WITH FOOD, Disp: 60 tablet, Rfl: 0  •  valsartan (DIOVAN) 160 MG tablet, valsartan 160 mg tablet  TAKE 1 TABLET BY MOUTH DAILY, Disp: , Rfl:   •  vitamin B-12 (CYANOCOBALAMIN) 1000 MCG tablet, Take 1,200 mcg by mouth Daily., Disp: , Rfl:   •  vitamin E 100 UNIT capsule, Take 100 Units by mouth Daily., Disp: , Rfl:   •  Zinc 50 MG capsule, Take  by mouth., Disp: , Rfl:      Allergies   Allergen Reactions   • Codeine Nausea Only and Nausea And Vomiting       Social History     Tobacco Use   • Smoking status: Former Smoker     Packs/day: 1.50     Quit date:      Years since quittin.8   • Smokeless tobacco: Never Used   Vaping Use   • Vaping Use: Never used   Substance Use Topics   • Alcohol use: Never   • Drug use: Not on file        Objective     Vital Signs:   There were no vitals taken for this visit.      Physical Exam    General: Well developed, well nourished patient of stated age in no acute distress. Voice is strong and clear.   Head: Normocephalic and atraumatic.  Face: No lesions.  Bilateral parotid and submandibular glands are unremarkable.  Stensen's and Warthin's ducts are productive of clear saliva bilaterally.  House-Brackmann I/VI     bilaterally.   muscles and temporomandibular joint nontender to palpation.  No TMJ crepitus.  Eyes: PERRLA, sclerae anicteric, no conjunctival injection. Extra ocular movements are intact and full. No nystagmus.   Ears: Auricles are normal in appearance. Bilateral external auditory canals are unremarkable. Bilateral tympanic membranes are clear and without effusion. Hearing normal to conversational voice.   Nose: External nose is normal in appearance. Bilateral nares are patent with normal appearing mucosa.  There is a  small subcutaneous cyst present on the right anterior septum.  Septum midline. Turbinates are unremarkable. No lesions.   Oral Cavity: Lips are normal in appearance. Oral mucosa is unremarkable. Gingiva is unremarkable. Normal dentition for age. Tongue is unremarkable with good movement. Hard palate is unremarkable.   Oropharynx: Soft palate is unremarkable with full movement. Uvula is unremarkable. Bilateral tonsils are unremarkable. Posterior oropharynx is unremarkable.    Larynx and hypopharynx: Deferred secondary to gag reflex.  Neck: Supple.  Right level V 1 cm  mass which is firm, superficial, and nontender to palpation.  No obvious punctum.  Trachea midline. Thyroid normal size and without nodules to palpation.   Lymphatic: No lymphadenopathy upon palpation.  Respiratory: Clear to auscultation bilaterally, nonlabored respirations    Cardiovascular: RRR, no murmurs, rubs, or gallops,   Psychiatric: Appropriate affect, cooperative   Neurologic: Oriented x 3, strength symmetric in all extremities, Cranial Nerves II-XII are grossly intact to confrontation   Skin: Warm and dry. No rashes.    Procedures     Excisional biopsy of right-sided neck mass measuring 1 cm:    The skin overlying the right-sided neck mass was cleaned with an alcohol wipe and infiltrated with 3 mL of 1% lidocaine with 1 100,000 epinephrine.  The skin was then prepped and draped in sterile fashion using Betadine and sterile towels.  After giving the medication ample time to take effect a 15 blade was used to incise through the skin down to the mass.  This was dissected free from the surrounding tissue with tenotomy scissors and will be sent for permanent pathological review.  Hemostasis was obtained with pressure and the wound was closed in layered fashion using 4-0 Vicryl deeps and 5-0 fast gut in simple running fashion.  Antibiotic ointment was applied.  The patient tolerated the procedure well.      Result Review :               Assessment  "and Plan    There are no diagnoses linked to this encounter.Impressions and findings were discussed.  Currently, he would like to have his right level 5 neck mass excised today in clinic.  It does seem to have increased slightly in size to 1 cm and is causing where \"twinges\" of pain.  We discussed the risks, benefits, and alternatives.  The risks include bleeding, infection, scarring, numbness, and need for further procedures.  After a thorough discussion written consent was obtained and he tolerated the procedure well.  This will be sent for permanent pathological review.  We discussed post operative care and he will follow up in 2 weeks or sooner if needed.      Follow Up   No follow-ups on file.  Patient was given instructions and counseling regarding his condition or for health maintenance advice. Please see specific information pulled into the AVS if appropriate.    "

## 2021-11-12 LAB
CYTO UR: NORMAL
LAB AP CASE REPORT: NORMAL
LAB AP CLINICAL INFORMATION: NORMAL
PATH REPORT.FINAL DX SPEC: NORMAL
PATH REPORT.GROSS SPEC: NORMAL

## 2021-11-16 RX ORDER — FENOFIBRATE 145 MG/1
TABLET, COATED ORAL
Qty: 90 TABLET | Refills: 0 | Status: SHIPPED | OUTPATIENT
Start: 2021-11-16 | End: 2022-02-14

## 2021-11-22 RX ORDER — EMPAGLIFLOZIN, METFORMIN HYDROCHLORIDE 12.5; 1 MG/1; MG/1
TABLET, EXTENDED RELEASE ORAL
Qty: 60 TABLET | Refills: 0 | Status: SHIPPED | OUTPATIENT
Start: 2021-11-22 | End: 2022-02-07

## 2021-12-10 RX ORDER — VALSARTAN 160 MG/1
TABLET ORAL
Qty: 90 TABLET | Refills: 0 | Status: SHIPPED | OUTPATIENT
Start: 2021-12-10 | End: 2022-03-15

## 2022-01-25 ENCOUNTER — LAB (OUTPATIENT)
Dept: LAB | Facility: HOSPITAL | Age: 67
End: 2022-01-25

## 2022-01-25 DIAGNOSIS — E11.43 TYPE 2 DIABETES MELLITUS WITH DIABETIC AUTONOMIC NEUROPATHY, WITHOUT LONG-TERM CURRENT USE OF INSULIN: Chronic | ICD-10-CM

## 2022-01-25 DIAGNOSIS — E78.2 MIXED HYPERLIPIDEMIA: ICD-10-CM

## 2022-01-25 LAB
CHOLEST SERPL-MCNC: 205 MG/DL (ref 0–200)
HBA1C MFR BLD: 10.58 % (ref 4.8–5.6)
HDLC SERPL-MCNC: 27 MG/DL (ref 40–60)
LDLC SERPL CALC-MCNC: 88 MG/DL (ref 0–100)
LDLC/HDLC SERPL: 2.56 {RATIO}
TRIGL SERPL-MCNC: 545 MG/DL (ref 0–150)
VLDLC SERPL-MCNC: 90 MG/DL (ref 5–40)

## 2022-01-25 PROCEDURE — 36415 COLL VENOUS BLD VENIPUNCTURE: CPT

## 2022-01-25 PROCEDURE — 80061 LIPID PANEL: CPT

## 2022-01-25 PROCEDURE — 83036 HEMOGLOBIN GLYCOSYLATED A1C: CPT

## 2022-01-26 ENCOUNTER — TELEPHONE (OUTPATIENT)
Dept: FAMILY MEDICINE CLINIC | Facility: CLINIC | Age: 67
End: 2022-01-26

## 2022-01-26 NOTE — TELEPHONE ENCOUNTER
----- Message from Charlotte Kulkarni DO sent at 1/26/2022  8:08 AM EST -----  1.  Hemoglobin A1c is up from 8.84-10.58.  I really believe he needs to be using a once a week injectable such as Trulicity or Ozempic.  We need to figure out which one will be paid for by his insurance and I feel like this is what can bring his blood sugar down the most.  His cholesterol is good but his triglycerides are elevated most likely this is related to his blood sugar.

## 2022-01-27 ENCOUNTER — OFFICE VISIT (OUTPATIENT)
Dept: FAMILY MEDICINE CLINIC | Facility: CLINIC | Age: 67
End: 2022-01-27

## 2022-01-27 VITALS
RESPIRATION RATE: 18 BRPM | WEIGHT: 250.2 LBS | HEART RATE: 89 BPM | SYSTOLIC BLOOD PRESSURE: 114 MMHG | BODY MASS INDEX: 32.11 KG/M2 | HEIGHT: 74 IN | OXYGEN SATURATION: 95 % | TEMPERATURE: 97.7 F | DIASTOLIC BLOOD PRESSURE: 66 MMHG

## 2022-01-27 DIAGNOSIS — I10 ESSENTIAL HYPERTENSION: Chronic | ICD-10-CM

## 2022-01-27 DIAGNOSIS — E66.09 CLASS 1 OBESITY DUE TO EXCESS CALORIES WITH SERIOUS COMORBIDITY AND BODY MASS INDEX (BMI) OF 32.0 TO 32.9 IN ADULT: Chronic | ICD-10-CM

## 2022-01-27 DIAGNOSIS — E11.43 TYPE 2 DIABETES MELLITUS WITH DIABETIC AUTONOMIC NEUROPATHY, WITHOUT LONG-TERM CURRENT USE OF INSULIN: Primary | Chronic | ICD-10-CM

## 2022-01-27 PROBLEM — E66.811 CLASS 1 OBESITY DUE TO EXCESS CALORIES WITH SERIOUS COMORBIDITY AND BODY MASS INDEX (BMI) OF 33.0 TO 33.9 IN ADULT: Chronic | Status: RESOLVED | Noted: 2021-10-25 | Resolved: 2022-01-27

## 2022-01-27 PROBLEM — E66.811 CLASS 1 OBESITY DUE TO EXCESS CALORIES WITH SERIOUS COMORBIDITY AND BODY MASS INDEX (BMI) OF 32.0 TO 32.9 IN ADULT: Status: ACTIVE | Noted: 2021-10-25

## 2022-01-27 PROCEDURE — 99214 OFFICE O/P EST MOD 30 MIN: CPT | Performed by: FAMILY MEDICINE

## 2022-01-27 NOTE — PROGRESS NOTES
"Chief Complaint  Labs Only (results ) and Weight Loss (down 5lbs )    Subjective          Chaparro Weber presents to [facility]    He is here today for the management of his chronic medical conditions. He has a past medical history significant for asthma, COPD, congestive heart failure, chronic back pain managed by pain management, CAD with pace maker, diabetes, hypertension, very low testosterone levels, hyperlipidemia, kidney stones.  He has a family history of coronary disease and colon cancer and his dad  at 74.  He is previous smoker and smoked for approximately 12 years.  He denies alcohol use.    He has been eating more potatoes and fried foods and his A1c was risen to over 10.     The patient has no other complaints today and denies chest pain, shortness of breath, weakness, numbness, nausea, vomiting, diarrhea, dizziness or syncopal event.        Objective   Vital Signs:   /66 (BP Location: Left arm, Patient Position: Sitting)   Pulse 89   Temp 97.7 °F (36.5 °C)   Resp 18   Ht 188 cm (74\")   Wt 113 kg (250 lb 3.2 oz)   SpO2 95%   BMI 32.12 kg/m²     Physical Exam  Vitals reviewed.   Constitutional:       Appearance: Normal appearance. He is well-developed. He is obese.   HENT:      Head: Normocephalic and atraumatic.      Right Ear: External ear normal.      Left Ear: External ear normal.      Mouth/Throat:      Pharynx: No oropharyngeal exudate.   Eyes:      Conjunctiva/sclera: Conjunctivae normal.      Pupils: Pupils are equal, round, and reactive to light.   Neck:      Vascular: No carotid bruit.   Cardiovascular:      Rate and Rhythm: Normal rate and regular rhythm.      Heart sounds: No murmur heard.  No friction rub. No gallop.    Pulmonary:      Effort: Pulmonary effort is normal.      Breath sounds: Normal breath sounds. No wheezing or rhonchi.   Abdominal:      General: There is no distension.   Skin:     General: Skin is warm and dry. "   Neurological:      Mental Status: He is alert and oriented to person, place, and time.      Cranial Nerves: No cranial nerve deficit.      Motor: No weakness.   Psychiatric:         Mood and Affect: Mood and affect normal.         Behavior: Behavior normal.         Thought Content: Thought content normal.         Judgment: Judgment normal.        Result Review :     CMP    CMP 7/19/21   Glucose 245 (A)   BUN 17   Creatinine 1.08   eGFR Non African Am 69   Sodium 138   Potassium 4.7   Chloride 100   Calcium 9.5   Albumin 4.70   Total Bilirubin 0.7   Alkaline Phosphatase 44   AST (SGOT) 53 (A)   ALT (SGPT) 46 (A)   (A) Abnormal value       Comments are available for some flowsheets but are not being displayed.           CBC    CBC 7/19/21   WBC 4.69   RBC 5.21   Hemoglobin 15.2   Hematocrit 47.2   MCV 90.6   MCH 29.2   MCHC 32.2   RDW 13.7   Platelets 138 (A)   (A) Abnormal value            Lipid Panel    Lipid Panel 7/19/21 1/25/22   Total Cholesterol 176 205 (A)   Triglycerides 372 (A) 545 (A)   HDL Cholesterol 34 (A) 27 (A)   VLDL Cholesterol 60 (A) 90 (A)   LDL Cholesterol  82 88   LDL/HDL Ratio 1.99 2.56   (A) Abnormal value            TSH    TSH 7/19/21   TSH 2.370                     Assessment and Plan    Diagnoses and all orders for this visit:    1. Type 2 diabetes mellitus with diabetic autonomic neuropathy, without long-term current use of insulin (HCC) (Primary)  Assessment & Plan:  Diabetes is worsening.   Continue current treatment regimen.  Dietary recommendations for ADA diet.  Diabetes will be reassessed in 3 months.    Orders:  -     Hemoglobin A1c; Future    2. Essential hypertension  Assessment & Plan:  Hypertension is improving with treatment.  Continue current treatment regimen.  Dietary sodium restriction.  Weight loss.  Blood pressure will be reassessed at the next regular appointment.      3. Class 1 obesity due to excess calories with serious comorbidity and body mass index (BMI) of 32.0  to 32.9 in adult  Assessment & Plan:  Patient's (Body mass index is 32.12 kg/m².) indicates that they are obese (BMI >30) with health conditions that include hypertension, diabetes mellitus and dyslipidemias . Weight is improving with lifestyle modifications. BMI is is above average; BMI management plan is completed. We discussed low calorie, low carb based diet program, portion control and increasing exercise.         Follow Up   Return in about 3 months (around 4/27/2022).  Patient was given instructions and counseling regarding his condition or for health maintenance advice. Please see specific information pulled into the AVS if appropriate.

## 2022-01-27 NOTE — ASSESSMENT & PLAN NOTE
Patient's (Body mass index is 32.12 kg/m².) indicates that they are obese (BMI >30) with health conditions that include hypertension, diabetes mellitus and dyslipidemias . Weight is improving with lifestyle modifications. BMI is is above average; BMI management plan is completed. We discussed low calorie, low carb based diet program, portion control and increasing exercise.

## 2022-01-27 NOTE — ASSESSMENT & PLAN NOTE
Diabetes is worsening.   Continue current treatment regimen.  Dietary recommendations for ADA diet.  Diabetes will be reassessed in 3 months.

## 2022-02-07 RX ORDER — EMPAGLIFLOZIN, METFORMIN HYDROCHLORIDE 12.5; 1 MG/1; MG/1
TABLET, EXTENDED RELEASE ORAL
Qty: 60 TABLET | Refills: 0 | Status: SHIPPED | OUTPATIENT
Start: 2022-02-07 | End: 2022-03-10

## 2022-02-14 RX ORDER — FENOFIBRATE 145 MG/1
TABLET, COATED ORAL
Qty: 90 TABLET | Refills: 0 | Status: SHIPPED | OUTPATIENT
Start: 2022-02-14 | End: 2022-02-21

## 2022-02-21 RX ORDER — FENOFIBRATE 145 MG/1
TABLET, COATED ORAL
Qty: 90 TABLET | Refills: 0 | Status: SHIPPED | OUTPATIENT
Start: 2022-02-21 | End: 2022-08-29

## 2022-03-10 RX ORDER — EMPAGLIFLOZIN, METFORMIN HYDROCHLORIDE 12.5; 1 MG/1; MG/1
TABLET, EXTENDED RELEASE ORAL
Qty: 60 TABLET | Refills: 0 | Status: SHIPPED | OUTPATIENT
Start: 2022-03-10 | End: 2022-04-11

## 2022-03-11 ENCOUNTER — TELEPHONE (OUTPATIENT)
Dept: SURGERY | Facility: CLINIC | Age: 67
End: 2022-03-11

## 2022-03-11 NOTE — TELEPHONE ENCOUNTER
Hub staff attempted to follow warm transfer process and was unsuccessful     Caller: BEAU FARIA    Relationship to patient: SELF    Best call back number: [phone]    Patient is needing: RETURNING CALL FOR IKER IN THE OFFICE. HE BELIEVES THE CALL IS REGARDING COLON SCREENING.

## 2022-03-15 RX ORDER — VALSARTAN 160 MG/1
TABLET ORAL
Qty: 90 TABLET | Refills: 0 | Status: SHIPPED | OUTPATIENT
Start: 2022-03-15 | End: 2022-06-23

## 2022-04-11 RX ORDER — EMPAGLIFLOZIN, METFORMIN HYDROCHLORIDE 12.5; 1 MG/1; MG/1
TABLET, EXTENDED RELEASE ORAL
Qty: 60 TABLET | Refills: 0 | Status: ON HOLD | OUTPATIENT
Start: 2022-04-11 | End: 2022-06-22

## 2022-04-11 RX ORDER — NATEGLINIDE 60 MG/1
TABLET ORAL
Qty: 270 TABLET | Refills: 0 | Status: SHIPPED | OUTPATIENT
Start: 2022-04-11 | End: 2022-04-18

## 2022-04-18 ENCOUNTER — OFFICE VISIT (OUTPATIENT)
Dept: SURGERY | Facility: CLINIC | Age: 67
End: 2022-04-18

## 2022-04-18 ENCOUNTER — PREP FOR SURGERY (OUTPATIENT)
Dept: OTHER | Facility: HOSPITAL | Age: 67
End: 2022-04-18

## 2022-04-18 VITALS — RESPIRATION RATE: 18 BRPM | WEIGHT: 245 LBS | BODY MASS INDEX: 31.44 KG/M2 | HEIGHT: 74 IN | HEART RATE: 97 BPM

## 2022-04-18 DIAGNOSIS — Z86.010 HISTORY OF COLONIC POLYPS: ICD-10-CM

## 2022-04-18 DIAGNOSIS — Z12.11 SCREENING FOR MALIGNANT NEOPLASM OF COLON: Primary | ICD-10-CM

## 2022-04-18 DIAGNOSIS — Z80.0 FAMILY HISTORY OF COLON CANCER IN FATHER: ICD-10-CM

## 2022-04-18 PROBLEM — Z86.0100 HISTORY OF COLONIC POLYPS: Status: ACTIVE | Noted: 2022-04-18

## 2022-04-18 PROCEDURE — S0260 H&P FOR SURGERY: HCPCS | Performed by: NURSE PRACTITIONER

## 2022-04-18 RX ORDER — METOPROLOL SUCCINATE 25 MG/1
1 TABLET, EXTENDED RELEASE ORAL DAILY
COMMUNITY
Start: 2022-02-07

## 2022-04-18 RX ORDER — SODIUM CHLORIDE 0.9 % (FLUSH) 0.9 %
10 SYRINGE (ML) INJECTION AS NEEDED
Status: CANCELLED | OUTPATIENT
Start: 2022-04-18

## 2022-04-18 RX ORDER — CELECOXIB 100 MG/1
CAPSULE ORAL
COMMUNITY
Start: 2022-03-23 | End: 2022-07-25

## 2022-04-18 RX ORDER — NATEGLINIDE 60 MG/1
TABLET ORAL
Qty: 270 TABLET | Refills: 0 | Status: SHIPPED | OUTPATIENT
Start: 2022-04-18 | End: 2022-04-29

## 2022-04-18 RX ORDER — ESCITALOPRAM OXALATE 20 MG/1
TABLET ORAL
Qty: 90 TABLET | Refills: 1 | Status: SHIPPED | OUTPATIENT
Start: 2022-04-18 | End: 2022-10-17

## 2022-04-18 RX ORDER — SODIUM CHLORIDE 0.9 % (FLUSH) 0.9 %
3 SYRINGE (ML) INJECTION EVERY 12 HOURS SCHEDULED
Status: CANCELLED | OUTPATIENT
Start: 2022-04-18

## 2022-04-18 RX ORDER — POLYETHYLENE GLYCOL 3350 17 G/17G
POWDER, FOR SOLUTION ORAL
Qty: 238 PACKET | Refills: 0 | Status: ON HOLD | OUTPATIENT
Start: 2022-04-18 | End: 2022-06-22

## 2022-04-18 NOTE — PROGRESS NOTES
Chief Complaint: Colonoscopy (consult)    Subjective      Colonoscopy consultation       History of Present Illness  Chaparro Weber is a 66 y.o. male presents to [facility] for colonoscopy consultation.    Patient presents today without complaints for screening colonoscopy.    He denies any abdominal pain, change in bowel habit, rectal bleeding.    Admits to family history of colon cancer with his father.    Admits to history of colonic polyps.    4/19: Colonoscopy (Townsend): Rectum - serrated hyperplastic; diverticulosis.    Objective     Past Medical History:   Diagnosis Date   • Anxiety    • Arthritis    • Asthma    • Chest pain    • CHF (congestive heart failure) ([facility])    • Condition not found     Hernia-Unspecified   • COPD (chronic obstructive pulmonary disease) ([facility])    • Deafness    • Diabetes ([facility])    • HBP (high blood pressure)    • Heart disease    • High cholesterol    • Kidney stones    • Lung disease    • Night sweats    • Ringing in ear    • Type 2 diabetes mellitus with autonomic neuropathy ([facility]) 7/16/2021       Past Surgical History:   Procedure Laterality Date   • APPENDECTOMY     • BACK SURGERY  2000,2010    x2   • COLONOSCOPY     • GALLBLADDER SURGERY  2016   • KIDNEY SURGERY     • NECK SURGERY  2005   • PACEMAKER IMPLANTATION         Outpatient Medications Marked as Taking for the 4/18/22 encounter (Office Visit) with Satnam April, APRN   Medication Sig Dispense Refill   • aspirin  MG tablet Take 325 mg by mouth Daily.     • celecoxib (CeleBREX) 100 MG capsule      • cholecalciferol (VITAMIN D3) 25 MCG (1000 UT) tablet Take 2,000 Units by mouth Daily.     • Diclofenac Sodium (VOLTAREN) 1 % gel gel      • escitalopram (LEXAPRO) 20 MG tablet TAKE ONE TABLET BY MOUTH ONCE DAILY 90 tablet 1   • fenofibrate (TRICOR) 145 MG tablet TAKE ONE TABLET BY MOUTH ONCE DAILY 90 tablet 0   • Glucosamine Sulfate 500 MG tablet      • HYDROcodone-acetaminophen (NORCO)  "7.5-325 MG per tablet hydrocodone 7.5 mg-acetaminophen 325 mg tablet     • Inulin (FIBER CHOICE PO) Take  by mouth.     • methylcellulose, Laxative, (CITRUCEL) 500 MG tablet tablet Citrucel 500 mg oral tablet take 1 tablet by oral route daily   Active     • metoprolol succinate XL (TOPROL-XL) 25 MG 24 hr tablet Take 1 tablet by mouth Daily.     • nateglinide (STARLIX) 60 MG tablet TAKE ONE TABLET BY MOUTH 3 TIMES DAILY 270 tablet 0   • pregabalin (LYRICA) 300 MG capsule      • Synjardy XR 12.5-1000 MG tablet sustained-release 24 hour TAKE TWO TABLETS EVERY MORNING WITH FOOD 60 tablet 0   • valsartan (DIOVAN) 160 MG tablet TAKE ONE TABLET BY MOUTH ONCE DAILY FOR BLOOD PRESSURE 90 tablet 0   • vitamin B-12 (CYANOCOBALAMIN) 1000 MCG tablet Take 1,200 mcg by mouth Daily.     • vitamin E 100 UNIT capsule Take 100 Units by mouth Daily.     • Zinc 50 MG capsule Take  by mouth.         Allergies   Allergen Reactions   • Codeine Nausea Only and Nausea And Vomiting        Family History   Problem Relation Age of Onset   • Other Mother 60        Renal Cancer   • Prostate cancer Father    • Colon cancer Father 70       Social History     Socioeconomic History   • Marital status:    Tobacco Use   • Smoking status: Former Smoker     Packs/day: 1.50     Quit date:      Years since quittin.3   • Smokeless tobacco: Never Used   Vaping Use   • Vaping Use: Never used   Substance and Sexual Activity   • Alcohol use: Never   • Drug use: Never   • Sexual activity: Defer       Review of Systems   Constitutional: Negative for chills and fever.   Gastrointestinal: Negative for abdominal distention, abdominal pain, anal bleeding, blood in stool, constipation, diarrhea and rectal pain.        Vital Signs:   Pulse 97   Resp 18   Ht 188 cm (74\")   Wt 111 kg (245 lb)   BMI 31.46 kg/m²      Physical Exam  Constitutional:       Appearance: Normal appearance.   HENT:      Head: Normocephalic.   Cardiovascular:      Rate and " Rhythm: Normal rate.   Pulmonary:      Effort: Pulmonary effort is normal.   Abdominal:      General: Abdomen is flat.      Palpations: Abdomen is soft.   Skin:     General: Skin is warm and dry.   Neurological:      General: No focal deficit present.      Mental Status: He is alert and oriented to person, place, and time.   Psychiatric:         Mood and Affect: Mood normal.         Thought Content: Thought content normal.          Result Review :          []  Laboratory  []  Radiology  [x]  Pathology  []  Microbiology  []  EKG/Telemetry   []  Cardiology/Vascular   [x]  Old records  Today I reviewed Dr. Townsend's previous colonoscopy and pathology.     Assessment and Plan    Diagnoses and all orders for this visit:    1. Screening for malignant neoplasm of colon (Primary)    2. Family history of colon cancer in father    3. History of colonic polyps    Other orders  -     polyethylene glycol (MIRALAX) 17 g packet; Take as directed.  Instructions given in office.  Dispense: 238 g bottle  Dispense: 238 packet; Refill: 0    white prep    Follow Up   Return for Schedule colonoscopy with Dr. Townsend on 6/22/2022 at [facility] [place], operated by [facility].     Hospital arrival time: 0800.    Possible risks/complications, benefits, and alternatives to surgical or invasive procedure have been explained to patient and/or legal guardian.     Patient has been evaluated and can tolerate anesthesia and/or sedation. Risks, benefits, and alternatives to anesthesia and sedation have been explained to patient and/or legal guardian.  Patient verbalizes understanding is will proceed with above plan.    Patient was given instructions and counseling regarding his condition or for health maintenance advice. Please see specific information pulled into the AVS if appropriate.

## 2022-04-25 ENCOUNTER — LAB (OUTPATIENT)
Dept: LAB | Facility: HOSPITAL | Age: 67
End: 2022-04-25

## 2022-04-25 DIAGNOSIS — E11.43 TYPE 2 DIABETES MELLITUS WITH DIABETIC AUTONOMIC NEUROPATHY, WITHOUT LONG-TERM CURRENT USE OF INSULIN: Chronic | ICD-10-CM

## 2022-04-25 LAB — HBA1C MFR BLD: 9.6 % (ref 4.8–5.6)

## 2022-04-25 PROCEDURE — 83036 HEMOGLOBIN GLYCOSYLATED A1C: CPT

## 2022-04-25 PROCEDURE — 36415 COLL VENOUS BLD VENIPUNCTURE: CPT

## 2022-04-29 ENCOUNTER — OFFICE VISIT (OUTPATIENT)
Dept: FAMILY MEDICINE CLINIC | Facility: CLINIC | Age: 67
End: 2022-04-29

## 2022-04-29 VITALS
OXYGEN SATURATION: 94 % | HEIGHT: 74 IN | SYSTOLIC BLOOD PRESSURE: 113 MMHG | RESPIRATION RATE: 18 BRPM | WEIGHT: 248.4 LBS | HEART RATE: 77 BPM | BODY MASS INDEX: 31.88 KG/M2 | TEMPERATURE: 97 F | DIASTOLIC BLOOD PRESSURE: 68 MMHG

## 2022-04-29 DIAGNOSIS — I10 ESSENTIAL HYPERTENSION: Chronic | ICD-10-CM

## 2022-04-29 DIAGNOSIS — E66.09 CLASS 1 OBESITY DUE TO EXCESS CALORIES WITH SERIOUS COMORBIDITY AND BODY MASS INDEX (BMI) OF 31.0 TO 31.9 IN ADULT: Chronic | ICD-10-CM

## 2022-04-29 DIAGNOSIS — E11.43 TYPE 2 DIABETES MELLITUS WITH DIABETIC AUTONOMIC NEUROPATHY, WITHOUT LONG-TERM CURRENT USE OF INSULIN: Primary | Chronic | ICD-10-CM

## 2022-04-29 PROBLEM — E66.811 CLASS 1 OBESITY DUE TO EXCESS CALORIES WITH SERIOUS COMORBIDITY AND BODY MASS INDEX (BMI) OF 32.0 TO 32.9 IN ADULT: Chronic | Status: RESOLVED | Noted: 2021-10-25 | Resolved: 2022-04-29

## 2022-04-29 PROCEDURE — 99214 OFFICE O/P EST MOD 30 MIN: CPT | Performed by: FAMILY MEDICINE

## 2022-04-29 RX ORDER — GLIMEPIRIDE 4 MG/1
TABLET ORAL
Qty: 60 TABLET | Refills: 5 | Status: SHIPPED | OUTPATIENT
Start: 2022-04-29 | End: 2022-12-29

## 2022-04-29 NOTE — ASSESSMENT & PLAN NOTE
Diabetes is improving with treatment.   Continue current treatment regimen.  Dietary recommendations for ADA diet.  Diabetes will be reassessed in 6 months.    The patient was prescribed Januvia today to see if it will go through his insurance or not.  He is currently on Starlix but does not take the may be 120 mg total a day with 360 be in the max dose.  We will get him started on at max dose of Amaryl this might help with his sugars.  Also, he is wanting to try some form of a continuous blood sugar monitor.  We will check to see if insurance will pay for a naman.

## 2022-04-29 NOTE — PROGRESS NOTES
"Chief Complaint  Diabetes and Results    Subjective          Chaparro Weber presents to [facility]  He is here today for the management of his chronic medical conditions. He has asthma, COPD, congestive heart failure, chronic back pain managed by pain management, CAD with pace maker, diabetes, hypertension, very low testosterone levels, hyperlipidemia, kidney stones.  He has a family history of coronary disease and colon cancer and his dad  at 74.  He is a previous smoker and smoked for approximately 12 years.  He denies alcohol use.    He is still exercising and plays pickleball.      The patient has no other complaints today and denies chest pain, shortness of breath, weakness, numbness, nausea, vomiting, diarrhea, dizziness or syncopal event.      Objective   Vital Signs:   /68 (BP Location: Left arm, Patient Position: Sitting)   Pulse 77   Temp 97 °F (36.1 °C)   Resp 18   Ht 188 cm (74.02\")   Wt 113 kg (248 lb 6.4 oz)   SpO2 94%   BMI 31.88 kg/m²     Physical Exam  Vitals reviewed.   Constitutional:       Appearance: Normal appearance. He is well-developed. He is obese.   HENT:      Head: Normocephalic and atraumatic.      Right Ear: External ear normal.      Left Ear: External ear normal.      Mouth/Throat:      Pharynx: No oropharyngeal exudate.   Eyes:      Conjunctiva/sclera: Conjunctivae normal.      Pupils: Pupils are equal, round, and reactive to light.   Neck:      Vascular: No carotid bruit.   Cardiovascular:      Rate and Rhythm: Normal rate and regular rhythm.      Heart sounds: No murmur heard.    No friction rub. No gallop.   Pulmonary:      Effort: Pulmonary effort is normal.      Breath sounds: Normal breath sounds. No wheezing or rhonchi.   Abdominal:      General: There is no distension.   Skin:     General: Skin is warm and dry.   Neurological:      Mental Status: He is alert and oriented to person, place, and time.      Cranial Nerves: No " cranial nerve deficit.      Motor: No weakness.   Psychiatric:         Mood and Affect: Mood and affect normal.         Behavior: Behavior normal.         Thought Content: Thought content normal.         Judgment: Judgment normal.        Result Review :     CMP    CMP 7/19/21   Glucose 245 (A)   BUN 17   Creatinine 1.08   eGFR Non African Am 69   Sodium 138   Potassium 4.7   Chloride 100   Calcium 9.5   Albumin 4.70   Total Bilirubin 0.7   Alkaline Phosphatase 44   AST (SGOT) 53 (A)   ALT (SGPT) 46 (A)   (A) Abnormal value       Comments are available for some flowsheets but are not being displayed.           CBC    CBC 7/19/21   WBC 4.69   RBC 5.21   Hemoglobin 15.2   Hematocrit 47.2   MCV 90.6   MCH 29.2   MCHC 32.2   RDW 13.7   Platelets 138 (A)   (A) Abnormal value            Lipid Panel    Lipid Panel 7/19/21 1/25/22   Total Cholesterol 176 205 (A)   Triglycerides 372 (A) 545 (A)   HDL Cholesterol 34 (A) 27 (A)   VLDL Cholesterol 60 (A) 90 (A)   LDL Cholesterol  82 88   LDL/HDL Ratio 1.99 2.56   (A) Abnormal value            TSH    TSH 7/19/21   TSH 2.370                     Assessment and Plan    Diagnoses and all orders for this visit:    1. Type 2 diabetes mellitus with diabetic autonomic neuropathy, without long-term current use of insulin (HCC) (Primary)  Assessment & Plan:  Diabetes is improving with treatment.   Continue current treatment regimen.  Dietary recommendations for ADA diet.  Diabetes will be reassessed in 6 months.    The patient was prescribed Januvia today to see if it will go through his insurance or not.  He is currently on Starlix but does not take the may be 120 mg total a day with 360 be in the max dose.  We will get him started on at max dose of Amaryl this might help with his sugars.  Also, he is wanting to try some form of a continuous blood sugar monitor.  We will check to see if insurance will pay for a naman.    Orders:  -     Hemoglobin A1c; Future    2. Essential  hypertension  Assessment & Plan:  Hypertension is improving with treatment.  Continue current treatment regimen.  Dietary sodium restriction.  Weight loss.  Blood pressure will be reassessed at the next regular appointment.      3. Class 1 obesity due to excess calories with serious comorbidity and body mass index (BMI) of 31.0 to 31.9 in adult  Assessment & Plan:  Patient's (Body mass index is 31.88 kg/m².) indicates that they are obese (BMI >30) with health conditions that include hypertension, diabetes mellitus and dyslipidemias . Weight is improving with lifestyle modifications. BMI is is above average; BMI management plan is completed. We discussed low calorie, low carb based diet program, portion control and increasing exercise.       Other orders  -     Discontinue: SITagliptin (Januvia) 100 MG tablet; Take 1 tablet by mouth Daily.  Dispense: 30 tablet; Refill: 5  -     glimepiride (Amaryl) 4 MG tablet; Take 1 tablet for one week. Then if blood sugar is above 160 in am fasting increase to 2 tablet daily.  Dispense: 60 tablet; Refill: 5             Follow Up   Return in about 3 months (around 7/29/2022).  Patient was given instructions and counseling regarding his condition or for health maintenance advice. Please see specific information pulled into the AVS if appropriate.

## 2022-04-29 NOTE — ASSESSMENT & PLAN NOTE
Patient's (Body mass index is 31.88 kg/m².) indicates that they are obese (BMI >30) with health conditions that include hypertension, diabetes mellitus and dyslipidemias . Weight is improving with lifestyle modifications. BMI is is above average; BMI management plan is completed. We discussed low calorie, low carb based diet program, portion control and increasing exercise.

## 2022-06-21 NOTE — PRE-PROCEDURE INSTRUCTIONS
Pt. Instructed on laxative and skin prep, pre-op meds, clear liquid diet. Ok to take Tylenol if needed but no other over the counter pain relievers, no vitamins, supplements.

## 2022-06-22 ENCOUNTER — ANESTHESIA (OUTPATIENT)
Dept: GASTROENTEROLOGY | Facility: HOSPITAL | Age: 67
End: 2022-06-22

## 2022-06-22 ENCOUNTER — ANESTHESIA EVENT (OUTPATIENT)
Dept: GASTROENTEROLOGY | Facility: HOSPITAL | Age: 67
End: 2022-06-22

## 2022-06-22 ENCOUNTER — HOSPITAL ENCOUNTER (OUTPATIENT)
Facility: HOSPITAL | Age: 67
Setting detail: HOSPITAL OUTPATIENT SURGERY
Discharge: HOME | End: 2022-06-22
Attending: SURGERY | Admitting: SURGERY

## 2022-06-22 VITALS
HEIGHT: 74 IN | DIASTOLIC BLOOD PRESSURE: 76 MMHG | BODY MASS INDEX: 31.24 KG/M2 | WEIGHT: 243.39 LBS | OXYGEN SATURATION: 92 % | RESPIRATION RATE: 14 BRPM | TEMPERATURE: 98.2 F | HEART RATE: 72 BPM | SYSTOLIC BLOOD PRESSURE: 107 MMHG

## 2022-06-22 DIAGNOSIS — Z12.11 SCREENING FOR MALIGNANT NEOPLASM OF COLON: ICD-10-CM

## 2022-06-22 DIAGNOSIS — Z80.0 FAMILY HISTORY OF COLON CANCER IN FATHER: ICD-10-CM

## 2022-06-22 DIAGNOSIS — Z86.010 HISTORY OF COLONIC POLYPS: ICD-10-CM

## 2022-06-22 PROBLEM — Z86.0100 HISTORY OF COLONIC POLYPS: Status: RESOLVED | Noted: 2022-04-18 | Resolved: 2022-06-22

## 2022-06-22 LAB — GLUCOSE BLDC GLUCOMTR-MCNC: 251 MG/DL (ref 70–99)

## 2022-06-22 PROCEDURE — 82962 GLUCOSE BLOOD TEST: CPT

## 2022-06-22 PROCEDURE — 25010000002 PROPOFOL 10 MG/ML EMULSION: Performed by: NURSE ANESTHETIST, CERTIFIED REGISTERED

## 2022-06-22 RX ORDER — PROPOFOL 10 MG/ML
VIAL (ML) INTRAVENOUS AS NEEDED
Status: DISCONTINUED | OUTPATIENT
Start: 2022-06-22 | End: 2022-06-22 | Stop reason: SURG

## 2022-06-22 RX ORDER — SODIUM CHLORIDE 0.9 % (FLUSH) 0.9 %
3 SYRINGE (ML) INJECTION EVERY 12 HOURS SCHEDULED
Status: DISCONTINUED | OUTPATIENT
Start: 2022-06-22 | End: 2022-06-22 | Stop reason: HOSPADM

## 2022-06-22 RX ORDER — SODIUM CHLORIDE, SODIUM LACTATE, POTASSIUM CHLORIDE, CALCIUM CHLORIDE 600; 310; 30; 20 MG/100ML; MG/100ML; MG/100ML; MG/100ML
30 INJECTION, SOLUTION INTRAVENOUS CONTINUOUS
Status: DISCONTINUED | OUTPATIENT
Start: 2022-06-22 | End: 2022-06-22 | Stop reason: HOSPADM

## 2022-06-22 RX ORDER — LIDOCAINE HYDROCHLORIDE 20 MG/ML
INJECTION, SOLUTION EPIDURAL; INFILTRATION; INTRACAUDAL; PERINEURAL AS NEEDED
Status: DISCONTINUED | OUTPATIENT
Start: 2022-06-22 | End: 2022-06-22 | Stop reason: SURG

## 2022-06-22 RX ORDER — SODIUM CHLORIDE 0.9 % (FLUSH) 0.9 %
10 SYRINGE (ML) INJECTION AS NEEDED
Status: DISCONTINUED | OUTPATIENT
Start: 2022-06-22 | End: 2022-06-22 | Stop reason: HOSPADM

## 2022-06-22 RX ADMIN — PROPOFOL 200 MCG/KG/MIN: 10 INJECTION, EMULSION INTRAVENOUS at 09:48

## 2022-06-22 RX ADMIN — PROPOFOL 50 MG: 10 INJECTION, EMULSION INTRAVENOUS at 09:48

## 2022-06-22 RX ADMIN — SODIUM CHLORIDE, POTASSIUM CHLORIDE, SODIUM LACTATE AND CALCIUM CHLORIDE 30 ML/HR: 600; 310; 30; 20 INJECTION, SOLUTION INTRAVENOUS at 09:18

## 2022-06-22 RX ADMIN — LIDOCAINE HYDROCHLORIDE 100 MG: 20 INJECTION, SOLUTION EPIDURAL; INFILTRATION; INTRACAUDAL; PERINEURAL at 09:48

## 2022-06-22 NOTE — ANESTHESIA POSTPROCEDURE EVALUATION
Patient: Chaparro Weber    Procedure Summary     Date: 06/22/22 Room / Location: [facility] 3 / [facility]    Anesthesia Start: 0947 Anesthesia Stop: 1015    Procedure: COLONOSCOPY (N/A ) Diagnosis:       Screening for malignant neoplasm of colon      Family history of colon cancer in father      History of colonic polyps      (Screening for malignant neoplasm of colon [Z12.11])      (Family history of colon cancer in father [Z80.0])      (History of colonic polyps [Z86.010])    Surgeons: Nicholas Townsend MD Provider: Ann Varghese DO    Anesthesia Type: general ASA Status: 3          Anesthesia Type: general    Vitals  Vitals Value Taken Time   /76 06/22/22 1030   Temp 36.8 °C (98.2 °F) 06/22/22 1030   Pulse 72 06/22/22 1030   Resp 14 06/22/22 1030   SpO2 92 % 06/22/22 1030           Post Anesthesia Care and Evaluation    Patient location during evaluation: bedside  Patient participation: complete - patient participated  Level of consciousness: awake  Pain management: adequate    Airway patency: patent  Anesthetic complications: No anesthetic complications  PONV Status: none  Cardiovascular status: acceptable and stable  Respiratory status: acceptable  Hydration status: acceptable    Comments: An Anesthesiologist personally participated in the most demanding procedures (including induction and emergence if applicable) in the anesthesia plan, monitored the course of anesthesia administration at frequent intervals and remained physically present and available for immediate diagnosis and treatment of emergencies.

## 2022-06-22 NOTE — H&P
Chief Complaint: Colonoscopy (consult)    Subjective      Colonoscopy consultation       History of Present Illness  Chaparro Weber is a 66 y.o. male presents to [facility] for colonoscopy consultation.    Patient presents today without complaints for screening colonoscopy.    He denies any abdominal pain, change in bowel habit, rectal bleeding.    Admits to family history of colon cancer with his father.    Admits to history of colonic polyps.    4/19: Colonoscopy (Townsend): Rectum - serrated hyperplastic; diverticulosis.    Objective     Past Medical History:   Diagnosis Date   • Anxiety    • Arthritis    • Asthma    • Chest pain    • CHF (congestive heart failure) ([facility])    • Condition not found     Hernia-Unspecified   • COPD (chronic obstructive pulmonary disease) ([facility])    • Deafness    • Diabetes ([facility])    • HBP (high blood pressure)    • Heart disease    • High cholesterol    • Kidney stones    • Lung disease    • Night sweats    • Ringing in ear    • Type 2 diabetes mellitus with autonomic neuropathy ([facility]) 7/16/2021       Past Surgical History:   Procedure Laterality Date   • APPENDECTOMY     • BACK SURGERY  2000,2010    x2   • COLONOSCOPY     • GALLBLADDER SURGERY  2016   • KIDNEY SURGERY     • NECK SURGERY  2005   • PACEMAKER IMPLANTATION         Outpatient Medications Marked as Taking for the 4/18/22 encounter (Office Visit) with Satnam April, APRN   Medication Sig Dispense Refill   • aspirin  MG tablet Take 325 mg by mouth Daily.     • celecoxib (CeleBREX) 100 MG capsule      • cholecalciferol (VITAMIN D3) 25 MCG (1000 UT) tablet Take 2,000 Units by mouth Daily.     • Diclofenac Sodium (VOLTAREN) 1 % gel gel      • escitalopram (LEXAPRO) 20 MG tablet TAKE ONE TABLET BY MOUTH ONCE DAILY 90 tablet 1   • fenofibrate (TRICOR) 145 MG tablet TAKE ONE TABLET BY MOUTH ONCE DAILY 90 tablet 0   • Glucosamine Sulfate 500 MG tablet      • HYDROcodone-acetaminophen (NORCO)  "7.5-325 MG per tablet hydrocodone 7.5 mg-acetaminophen 325 mg tablet     • Inulin (FIBER CHOICE PO) Take  by mouth.     • methylcellulose, Laxative, (CITRUCEL) 500 MG tablet tablet Citrucel 500 mg oral tablet take 1 tablet by oral route daily   Active     • metoprolol succinate XL (TOPROL-XL) 25 MG 24 hr tablet Take 1 tablet by mouth Daily.     • nateglinide (STARLIX) 60 MG tablet TAKE ONE TABLET BY MOUTH 3 TIMES DAILY 270 tablet 0   • pregabalin (LYRICA) 300 MG capsule      • Synjardy XR 12.5-1000 MG tablet sustained-release 24 hour TAKE TWO TABLETS EVERY MORNING WITH FOOD 60 tablet 0   • valsartan (DIOVAN) 160 MG tablet TAKE ONE TABLET BY MOUTH ONCE DAILY FOR BLOOD PRESSURE 90 tablet 0   • vitamin B-12 (CYANOCOBALAMIN) 1000 MCG tablet Take 1,200 mcg by mouth Daily.     • vitamin E 100 UNIT capsule Take 100 Units by mouth Daily.     • Zinc 50 MG capsule Take  by mouth.         Allergies   Allergen Reactions   • Codeine Nausea Only and Nausea And Vomiting        Family History   Problem Relation Age of Onset   • Other Mother 60        Renal Cancer   • Prostate cancer Father    • Colon cancer Father 70       Social History     Socioeconomic History   • Marital status:    Tobacco Use   • Smoking status: Former Smoker     Packs/day: 1.50     Quit date:      Years since quittin.3   • Smokeless tobacco: Never Used   Vaping Use   • Vaping Use: Never used   Substance and Sexual Activity   • Alcohol use: Never   • Drug use: Never   • Sexual activity: Defer       Vital Signs:   Pulse 97   Resp 18   Ht 188 cm (74\")   Wt 111 kg (245 lb)   BMI 31.46 kg/m²      Physical Exam  Constitutional:       Appearance: Normal appearance.   HENT:      Head: Normocephalic.   Cardiovascular:      Rate and Rhythm: Normal rate.   Pulmonary:      Effort: Pulmonary effort is normal.   Abdominal:      General: Abdomen is flat.      Palpations: Abdomen is soft.   Skin:     General: Skin is warm and dry.   Neurological:      " General: No focal deficit present.      Mental Status: He is alert and oriented to person, place, and time.   Psychiatric:         Mood and Affect: Mood normal.         Thought Content: Thought content normal.        Assessment    1. Screening for malignant neoplasm of colon (Primary)    2. Family history of colon cancer in father    3. History of colonic polyps    Plan  Colonoscopy    Risks and benefits discussed    Nicholas Townsend M.D.  06/22/22    Electronically signed by Nicholas Townsend MD, 06/22/22, 7:15 AM EDT.

## 2022-06-22 NOTE — ANESTHESIA PREPROCEDURE EVALUATION
Anesthesia Evaluation     Patient summary reviewed and Nursing notes reviewed   no history of anesthetic complications:  NPO Solid Status: > 8 hours  NPO Liquid Status: > 2 hours           Airway   Mallampati: III  TM distance: >3 FB  Possible difficult intubation  Dental          Pulmonary - normal exam   (+) a smoker Former, COPD, asthma (well-controlled),sleep apnea,   Cardiovascular - normal exam  Exercise tolerance: good (4-7 METS)    (+) pacemaker pacemaker, hypertension, dysrhythmias (LBBB), CHF , hyperlipidemia,       Neuro/Psych  (+) numbness, psychiatric history Anxiety,    GI/Hepatic/Renal/Endo    (+) obesity, morbid obesity, GERD,  renal disease stones, diabetes mellitus,     Musculoskeletal     Abdominal  - normal exam   Substance History - negative use     OB/GYN negative ob/gyn ROS         Other   arthritis,      ROS/Med Hx Other:                         Anesthesia Plan    ASA 3     general     (Total IV Anesthesia    Patient understands anesthesia not responsible for dental damage.  )  intravenous induction     Anesthetic plan, risks, benefits, and alternatives have been provided, discussed and informed consent has been obtained with: patient.    Plan discussed with CRNA.        CODE STATUS:

## 2022-06-23 RX ORDER — VALSARTAN 160 MG/1
TABLET ORAL
Qty: 90 TABLET | Refills: 0 | Status: SHIPPED | OUTPATIENT
Start: 2022-06-23 | End: 2022-09-22

## 2022-06-28 ENCOUNTER — TELEPHONE (OUTPATIENT)
Dept: FAMILY MEDICINE CLINIC | Facility: CLINIC | Age: 67
End: 2022-06-28

## 2022-06-28 RX ORDER — DEXTROMETHORPHAN HYDROBROMIDE AND PROMETHAZINE HYDROCHLORIDE 15; 6.25 MG/5ML; MG/5ML
5 SYRUP ORAL 4 TIMES DAILY PRN
Qty: 180 ML | Refills: 0 | Status: SHIPPED | OUTPATIENT
Start: 2022-06-28 | End: 2022-07-25

## 2022-06-28 RX ORDER — AZITHROMYCIN 250 MG/1
TABLET, FILM COATED ORAL
Qty: 6 TABLET | Refills: 0 | Status: SHIPPED | OUTPATIENT
Start: 2022-06-28 | End: 2022-07-25

## 2022-07-25 ENCOUNTER — OFFICE VISIT (OUTPATIENT)
Dept: FAMILY MEDICINE CLINIC | Facility: CLINIC | Age: 67
End: 2022-07-25

## 2022-07-25 VITALS
DIASTOLIC BLOOD PRESSURE: 68 MMHG | TEMPERATURE: 97.5 F | HEIGHT: 74 IN | HEART RATE: 81 BPM | RESPIRATION RATE: 20 BRPM | BODY MASS INDEX: 32.44 KG/M2 | OXYGEN SATURATION: 93 % | WEIGHT: 252.8 LBS | SYSTOLIC BLOOD PRESSURE: 98 MMHG

## 2022-07-25 DIAGNOSIS — E66.09 CLASS 1 OBESITY DUE TO EXCESS CALORIES WITH SERIOUS COMORBIDITY AND BODY MASS INDEX (BMI) OF 32.0 TO 32.9 IN ADULT: Chronic | ICD-10-CM

## 2022-07-25 DIAGNOSIS — E11.43 TYPE 2 DIABETES MELLITUS WITH DIABETIC AUTONOMIC NEUROPATHY, WITHOUT LONG-TERM CURRENT USE OF INSULIN: ICD-10-CM

## 2022-07-25 DIAGNOSIS — Z00.00 ANNUAL PHYSICAL EXAM: Primary | ICD-10-CM

## 2022-07-25 DIAGNOSIS — Z12.5 SCREENING FOR PROSTATE CANCER: ICD-10-CM

## 2022-07-25 DIAGNOSIS — E78.2 MIXED HYPERLIPIDEMIA: Chronic | ICD-10-CM

## 2022-07-25 DIAGNOSIS — I10 ESSENTIAL HYPERTENSION: Chronic | ICD-10-CM

## 2022-07-25 PROBLEM — E66.811 CLASS 1 OBESITY DUE TO EXCESS CALORIES WITH SERIOUS COMORBIDITY AND BODY MASS INDEX (BMI) OF 31.0 TO 31.9 IN ADULT: Chronic | Status: RESOLVED | Noted: 2021-10-25 | Resolved: 2022-07-25

## 2022-07-25 PROCEDURE — 99214 OFFICE O/P EST MOD 30 MIN: CPT | Performed by: FAMILY MEDICINE

## 2022-07-25 RX ORDER — SITAGLIPTIN 100 MG/1
TABLET, FILM COATED ORAL
COMMUNITY
Start: 2022-07-07 | End: 2022-11-08

## 2022-07-25 NOTE — ASSESSMENT & PLAN NOTE
Diabetes is improving with treatment.   Continue current treatment regimen.  Dietary recommendations for ADA diet.  Diabetes will be reassessed in 6 months.

## 2022-07-25 NOTE — ASSESSMENT & PLAN NOTE
The patient is current on his colon cancer screening.  The patient was encouraged to lose weight today.  He was encouraged to check his sugar regularly.  He was given lab orders today for screening labs to be manage according to findings.  He was encouraged to always wear seatbelt never text and drive.

## 2022-07-25 NOTE — ASSESSMENT & PLAN NOTE
Patient's (Body mass index is 32.41 kg/m².) indicates that they are obese (BMI >30) with health conditions that include hypertension, diabetes mellitus and dyslipidemias . Weight is improving with treatment. BMI is is above average; BMI management plan is completed. We discussed low calorie, low carb based diet program, portion control and increasing exercise.

## 2022-07-25 NOTE — PROGRESS NOTES
"Chief Complaint  Diabetes, Labs Only, and Shortness of Breath    Subjective        Chaparro Weber presents to [facility]  He is here today for the management of his chronic medical conditions and for an annual physical. He has asthma, COPD, congestive heart failure, chronic back pain managed by pain management, CAD with pace maker, diabetes, hypertension, very low testosterone levels, hyperlipidemia, kidney stones.  He has a family history of coronary disease and colon cancer and his dad  at 74.  He is a previous smoker and smoked for approximately 12 years.  He denies alcohol use.     He is still exercising and plays pickleball.      The patient has no other complaints today and denies chest pain, shortness of breath, weakness, numbness, nausea, vomiting, diarrhea, dizziness or syncopal event.      Objective   Vital Signs:  BP 98/68 (BP Location: Left arm, Patient Position: Sitting)   Pulse 81   Temp 97.5 °F (36.4 °C)   Resp 20   Ht 188.1 cm (74.06\")   Wt 115 kg (252 lb 12.8 oz)   SpO2 93%   BMI 32.41 kg/m²   Estimated body mass index is 32.41 kg/m² as calculated from the following:    Height as of this encounter: 188.1 cm (74.06\").    Weight as of this encounter: 115 kg (252 lb 12.8 oz).          Physical Exam  Vitals reviewed.   Constitutional:       Appearance: Normal appearance. He is well-developed. He is obese.   HENT:      Head: Normocephalic and atraumatic.      Right Ear: External ear normal.      Left Ear: External ear normal.      Mouth/Throat:      Pharynx: No oropharyngeal exudate.   Eyes:      Conjunctiva/sclera: Conjunctivae normal.      Pupils: Pupils are equal, round, and reactive to light.   Neck:      Vascular: No carotid bruit.   Cardiovascular:      Rate and Rhythm: Normal rate and regular rhythm.      Heart sounds: No murmur heard.    No friction rub. No gallop.   Pulmonary:      Effort: Pulmonary effort is normal.      Breath sounds: Normal " breath sounds. No wheezing or rhonchi.   Abdominal:      General: There is no distension.   Skin:     General: Skin is warm and dry.   Neurological:      Mental Status: He is alert and oriented to person, place, and time.      Cranial Nerves: No cranial nerve deficit.      Motor: No weakness.   Psychiatric:         Mood and Affect: Mood and affect normal.         Behavior: Behavior normal.         Thought Content: Thought content normal.         Judgment: Judgment normal.        Result Review :            Lipid Panel    Lipid Panel 1/25/22   Total Cholesterol 205 (A)   Triglycerides 545 (A)   HDL Cholesterol 27 (A)   VLDL Cholesterol 90 (A)   LDL Cholesterol  88   LDL/HDL Ratio 2.56   (A) Abnormal value                          Assessment and Plan   Diagnoses and all orders for this visit:    1. Annual physical exam (Primary)  Assessment & Plan:  The patient is current on his colon cancer screening.  The patient was encouraged to lose weight today.  He was encouraged to check his sugar regularly.  He was given lab orders today for screening labs to be manage according to findings.  He was encouraged to always wear seatbelt never text and drive.    Orders:  -     Comprehensive Metabolic Panel; Future  -     CBC & Differential; Future  -     TSH; Future    2. Screening for prostate cancer  -     PSA SCREENING; Future    3. Type 2 diabetes mellitus with diabetic autonomic neuropathy, without long-term current use of insulin (HCC)  Assessment & Plan:  Diabetes is improving with treatment.   Continue current treatment regimen.  Dietary recommendations for ADA diet.  Diabetes will be reassessed in 6 months.    Orders:  -     Microalbumin / Creatinine Urine Ratio - Urine, Clean Catch  -     Hemoglobin A1c; Future    4. Essential hypertension  Assessment & Plan:  Hypertension is improving with treatment.  Continue current treatment regimen.  Dietary sodium restriction.  Weight loss.  Blood pressure will be reassessed at the  next regular appointment.      5. Mixed hyperlipidemia  Assessment & Plan:  Lipid abnormalities are improving with treatment.  Nutritional counseling was provided. and Pharmacotherapy as ordered.  Lipids will be reassessed in 3 months.    Orders:  -     Lipid Panel; Future    6. Class 1 obesity due to excess calories with serious comorbidity and body mass index (BMI) of 32.0 to 32.9 in adult  Assessment & Plan:  Patient's (Body mass index is 32.41 kg/m².) indicates that they are obese (BMI >30) with health conditions that include hypertension, diabetes mellitus and dyslipidemias . Weight is improving with treatment. BMI is is above average; BMI management plan is completed. We discussed low calorie, low carb based diet program, portion control and increasing exercise.              Follow Up   No follow-ups on file.  Patient was given instructions and counseling regarding his condition or for health maintenance advice. Please see specific information pulled into the AVS if appropriate.

## 2022-07-28 ENCOUNTER — TELEPHONE (OUTPATIENT)
Dept: FAMILY MEDICINE CLINIC | Facility: CLINIC | Age: 67
End: 2022-07-28

## 2022-07-28 DIAGNOSIS — N40.0 BENIGN PROSTATIC HYPERPLASIA WITHOUT LOWER URINARY TRACT SYMPTOMS: Primary | ICD-10-CM

## 2022-07-28 NOTE — TELEPHONE ENCOUNTER
Mr. Weber called to talk with provider about getting a referral to urology with Dr Campbell to discuss his prostate issues. If we could call him and let him know when that has been put in.  I also explained that once the referral was submitted and cleared through insurance he would receive a call from scheduling.

## 2022-07-29 ENCOUNTER — LAB (OUTPATIENT)
Dept: LAB | Facility: HOSPITAL | Age: 67
End: 2022-07-29

## 2022-07-29 DIAGNOSIS — E11.43 TYPE 2 DIABETES MELLITUS WITH DIABETIC AUTONOMIC NEUROPATHY, WITHOUT LONG-TERM CURRENT USE OF INSULIN: Chronic | ICD-10-CM

## 2022-07-29 DIAGNOSIS — Z00.00 ANNUAL PHYSICAL EXAM: ICD-10-CM

## 2022-07-29 DIAGNOSIS — E78.2 MIXED HYPERLIPIDEMIA: ICD-10-CM

## 2022-07-29 DIAGNOSIS — Z12.5 SCREENING FOR PROSTATE CANCER: ICD-10-CM

## 2022-07-29 LAB
ALBUMIN SERPL-MCNC: 4.4 G/DL (ref 3.5–5.2)
ALBUMIN UR-MCNC: <1.2 MG/DL
ALBUMIN/GLOB SERPL: 1.8 G/DL
ALP SERPL-CCNC: 52 U/L (ref 39–117)
ALT SERPL W P-5'-P-CCNC: 56 U/L (ref 1–41)
ANION GAP SERPL CALCULATED.3IONS-SCNC: 11 MMOL/L (ref 5–15)
AST SERPL-CCNC: 49 U/L (ref 1–40)
BASOPHILS # BLD AUTO: 0.03 10*3/MM3 (ref 0–0.2)
BASOPHILS NFR BLD AUTO: 0.7 % (ref 0–1.5)
BILIRUB SERPL-MCNC: 0.2 MG/DL (ref 0–1.2)
BUN SERPL-MCNC: 23 MG/DL (ref 8–23)
BUN/CREAT SERPL: 20.9 (ref 7–25)
CALCIUM SPEC-SCNC: 9.3 MG/DL (ref 8.6–10.5)
CHLORIDE SERPL-SCNC: 99 MMOL/L (ref 98–107)
CHOLEST SERPL-MCNC: 171 MG/DL (ref 0–200)
CO2 SERPL-SCNC: 26 MMOL/L (ref 22–29)
CREAT SERPL-MCNC: 1.1 MG/DL (ref 0.76–1.27)
CREAT UR-MCNC: 50 MG/DL
DEPRECATED RDW RBC AUTO: 42.7 FL (ref 37–54)
EGFRCR SERPLBLD CKD-EPI 2021: 75 ML/MIN/1.73
EOSINOPHIL # BLD AUTO: 0.05 10*3/MM3 (ref 0–0.4)
EOSINOPHIL NFR BLD AUTO: 1.1 % (ref 0.3–6.2)
ERYTHROCYTE [DISTWIDTH] IN BLOOD BY AUTOMATED COUNT: 13.1 % (ref 12.3–15.4)
GLOBULIN UR ELPH-MCNC: 2.4 GM/DL
GLUCOSE SERPL-MCNC: 361 MG/DL (ref 65–99)
HBA1C MFR BLD: 10.2 % (ref 4.8–5.6)
HCT VFR BLD AUTO: 40.7 % (ref 37.5–51)
HDLC SERPL-MCNC: 22 MG/DL (ref 40–60)
HGB BLD-MCNC: 13.5 G/DL (ref 13–17.7)
LDLC SERPL CALC-MCNC: ABNORMAL MG/DL
LDLC/HDLC SERPL: ABNORMAL {RATIO}
LYMPHOCYTES # BLD AUTO: 1.42 10*3/MM3 (ref 0.7–3.1)
LYMPHOCYTES NFR BLD AUTO: 31.2 % (ref 19.6–45.3)
MCH RBC QN AUTO: 29.9 PG (ref 26.6–33)
MCHC RBC AUTO-ENTMCNC: 33.2 G/DL (ref 31.5–35.7)
MCV RBC AUTO: 90 FL (ref 79–97)
MICROALBUMIN/CREAT UR: NORMAL MG/G{CREAT}
MONOCYTES # BLD AUTO: 0.41 10*3/MM3 (ref 0.1–0.9)
MONOCYTES NFR BLD AUTO: 9 % (ref 5–12)
NEUTROPHILS NFR BLD AUTO: 2.63 10*3/MM3 (ref 1.7–7)
NEUTROPHILS NFR BLD AUTO: 57.8 % (ref 42.7–76)
PLATELET # BLD AUTO: 103 10*3/MM3 (ref 140–450)
PMV BLD AUTO: 11.3 FL (ref 6–12)
POTASSIUM SERPL-SCNC: 4.5 MMOL/L (ref 3.5–5.2)
PROT SERPL-MCNC: 6.8 G/DL (ref 6–8.5)
PSA SERPL-MCNC: <0.014 NG/ML (ref 0–4)
RBC # BLD AUTO: 4.52 10*6/MM3 (ref 4.14–5.8)
SODIUM SERPL-SCNC: 136 MMOL/L (ref 136–145)
TRIGL SERPL-MCNC: 817 MG/DL (ref 0–150)
TSH SERPL DL<=0.05 MIU/L-ACNC: 1.73 UIU/ML (ref 0.27–4.2)
VLDLC SERPL-MCNC: ABNORMAL MG/DL
WBC NRBC COR # BLD: 4.55 10*3/MM3 (ref 3.4–10.8)

## 2022-07-29 PROCEDURE — 36415 COLL VENOUS BLD VENIPUNCTURE: CPT

## 2022-07-29 PROCEDURE — 80053 COMPREHEN METABOLIC PANEL: CPT

## 2022-07-29 PROCEDURE — 82043 UR ALBUMIN QUANTITATIVE: CPT | Performed by: FAMILY MEDICINE

## 2022-07-29 PROCEDURE — 80061 LIPID PANEL: CPT

## 2022-07-29 PROCEDURE — 83721 ASSAY OF BLOOD LIPOPROTEIN: CPT

## 2022-07-29 PROCEDURE — 85025 COMPLETE CBC W/AUTO DIFF WBC: CPT

## 2022-07-29 PROCEDURE — G0103 PSA SCREENING: HCPCS

## 2022-07-29 PROCEDURE — 83036 HEMOGLOBIN GLYCOSYLATED A1C: CPT

## 2022-07-29 PROCEDURE — 82570 ASSAY OF URINE CREATININE: CPT | Performed by: FAMILY MEDICINE

## 2022-07-29 PROCEDURE — 84443 ASSAY THYROID STIM HORMONE: CPT

## 2022-07-30 LAB — ARTICHOKE IGE QN: 31 MG/DL (ref 0–100)

## 2022-08-01 ENCOUNTER — TELEPHONE (OUTPATIENT)
Dept: FAMILY MEDICINE CLINIC | Facility: CLINIC | Age: 67
End: 2022-08-01

## 2022-08-01 NOTE — TELEPHONE ENCOUNTER
Patient called stating he cannot get his insurance cards through [employer] because you are not updated in the system under providers.    He gave me this phone number for you to call if possible. I was not sure if you had to or we can call for you.    [phone]

## 2022-08-04 ENCOUNTER — OFFICE VISIT (OUTPATIENT)
Dept: FAMILY MEDICINE CLINIC | Facility: CLINIC | Age: 67
End: 2022-08-04

## 2022-08-04 VITALS
SYSTOLIC BLOOD PRESSURE: 106 MMHG | TEMPERATURE: 97 F | WEIGHT: 256 LBS | HEIGHT: 74 IN | OXYGEN SATURATION: 98 % | BODY MASS INDEX: 32.85 KG/M2 | DIASTOLIC BLOOD PRESSURE: 85 MMHG | HEART RATE: 78 BPM | RESPIRATION RATE: 18 BRPM

## 2022-08-04 DIAGNOSIS — E11.43 TYPE 2 DIABETES MELLITUS WITH DIABETIC AUTONOMIC NEUROPATHY, WITHOUT LONG-TERM CURRENT USE OF INSULIN: Primary | Chronic | ICD-10-CM

## 2022-08-04 DIAGNOSIS — I10 ESSENTIAL HYPERTENSION: Chronic | ICD-10-CM

## 2022-08-04 DIAGNOSIS — E66.09 CLASS 1 OBESITY DUE TO EXCESS CALORIES WITH SERIOUS COMORBIDITY AND BODY MASS INDEX (BMI) OF 32.0 TO 32.9 IN ADULT: Chronic | ICD-10-CM

## 2022-08-04 PROBLEM — Z00.00 ANNUAL PHYSICAL EXAM: Status: RESOLVED | Noted: 2022-04-18 | Resolved: 2022-08-04

## 2022-08-04 PROBLEM — E11.42 DIABETIC PERIPHERAL NEUROPATHY: Chronic | Status: ACTIVE | Noted: 2021-07-16

## 2022-08-04 PROCEDURE — 99214 OFFICE O/P EST MOD 30 MIN: CPT | Performed by: FAMILY MEDICINE

## 2022-08-04 RX ORDER — METFORMIN HYDROCHLORIDE 500 MG/1
500 TABLET, EXTENDED RELEASE ORAL
Qty: 180 TABLET | Refills: 1 | Status: SHIPPED | OUTPATIENT
Start: 2022-08-04 | End: 2022-09-12

## 2022-08-04 RX ORDER — TRAZODONE HYDROCHLORIDE 50 MG/1
TABLET ORAL
Qty: 90 TABLET | Refills: 2 | Status: SHIPPED | OUTPATIENT
Start: 2022-08-04 | End: 2022-09-12

## 2022-08-04 RX ORDER — SEMAGLUTIDE 1.34 MG/ML
INJECTION, SOLUTION SUBCUTANEOUS
Qty: 1 PEN | Refills: 2 | Status: SHIPPED | OUTPATIENT
Start: 2022-08-04 | End: 2022-09-12

## 2022-08-04 NOTE — PROGRESS NOTES
"Chief Complaint  Diabetes (Needs to discuss insurance issue.)    Subjective        Chaparro Weber presents to [facility]  He is here today for the management of his chronic medical conditions. He has asthma, COPD, congestive heart failure, chronic back pain managed by pain management, CAD with pace maker, diabetes, hypertension, very low testosterone levels, hyperlipidemia, kidney stones.  He has a family history of coronary disease and colon cancer and his dad  at 74.  He is a previous smoker and smoked for approximately 12 years.  He denies alcohol use.     He hurt his knee and has not been playing pickleball lately.      The patient has no complaints today and denies chest pain, shortness of breath, weakness, numbness, nausea, vomiting, diarrhea, dizziness or syncopal event.      Objective   Vital Signs:  /85   Pulse 78   Temp 97 °F (36.1 °C)   Resp 18   Ht 188 cm (74\")   Wt 116 kg (256 lb)   SpO2 98%   BMI 32.87 kg/m²   Estimated body mass index is 32.87 kg/m² as calculated from the following:    Height as of this encounter: 188 cm (74\").    Weight as of this encounter: 116 kg (256 lb).          Physical Exam  Vitals reviewed.   Constitutional:       Appearance: Normal appearance. He is well-developed. He is obese.   HENT:      Head: Normocephalic and atraumatic.      Right Ear: External ear normal.      Left Ear: External ear normal.      Mouth/Throat:      Pharynx: No oropharyngeal exudate.   Eyes:      Conjunctiva/sclera: Conjunctivae normal.      Pupils: Pupils are equal, round, and reactive to light.   Neck:      Vascular: No carotid bruit.   Cardiovascular:      Rate and Rhythm: Normal rate and regular rhythm.      Heart sounds: No murmur heard.    No friction rub. No gallop.   Pulmonary:      Effort: Pulmonary effort is normal.      Breath sounds: Normal breath sounds. No wheezing or rhonchi.   Abdominal:      General: There is no distension.   Skin:   "   General: Skin is warm and dry.   Neurological:      Mental Status: He is alert and oriented to person, place, and time.      Cranial Nerves: No cranial nerve deficit.      Motor: No weakness.   Psychiatric:         Mood and Affect: Mood and affect normal.         Behavior: Behavior normal.         Thought Content: Thought content normal.         Judgment: Judgment normal.        Result Review :    CMP    CMP 7/29/22   Glucose 361 (A)   BUN 23   Creatinine 1.10   Sodium 136   Potassium 4.5   Chloride 99   Calcium 9.3   Albumin 4.40   Total Bilirubin 0.2   Alkaline Phosphatase 52   AST (SGOT) 49 (A)   ALT (SGPT) 56 (A)   (A) Abnormal value            CBC    CBC 7/29/22   WBC 4.55   RBC 4.52   Hemoglobin 13.5   Hematocrit 40.7   MCV 90.0   MCH 29.9   MCHC 33.2   RDW 13.1   Platelets 103 (A)   (A) Abnormal value            Lipid Panel    Lipid Panel 1/25/22 7/29/22 7/29/22     1335 1335   Total Cholesterol 205 (A) 171    Triglycerides 545 (A) 817 (A)    HDL Cholesterol 27 (A) 22 (A)    VLDL Cholesterol 90 (A)     LDL Cholesterol  88  31   LDL/HDL Ratio 2.56     (A) Abnormal value       Comments are available for some flowsheets but are not being displayed.           TSH    TSH 7/29/22   TSH 1.730                     Assessment and Plan   Diagnoses and all orders for this visit:    1. Type 2 diabetes mellitus with diabetic autonomic neuropathy, without long-term current use of insulin (HCC) (Primary)  Assessment & Plan:  Diabetes is worsening.   Medication changes per orders.  Diabetes will be reassessed in 3 months.      2. Class 1 obesity due to excess calories with serious comorbidity and body mass index (BMI) of 32.0 to 32.9 in adult  Assessment & Plan:  Patient's (Body mass index is 32.87 kg/m².) indicates that they are obese (BMI >30) with health conditions that include hypertension, diabetes mellitus and dyslipidemias . Weight is unchanged. BMI is is above average; BMI management plan is completed. We discussed  low calorie, low carb based diet program, portion control and increasing exercise.       3. Essential hypertension  Assessment & Plan:  Hypertension is improving with treatment.  Continue current treatment regimen.  Dietary sodium restriction.  Weight loss.  Blood pressure will be reassessed at the next regular appointment.      Other orders  -     metFORMIN ER (Glucophage XR) 500 MG 24 hr tablet; Take 1 tablet by mouth Daily With Breakfast.  Dispense: 180 tablet; Refill: 1  -     traZODone (DESYREL) 50 MG tablet; Take 1 tablet nightly for a week for insomnia. May increase to 2 tablets in one week. After another week can increase to 3 tablets.  Dispense: 90 tablet; Refill: 2  -     Semaglutide,0.25 or 0.5MG/DOS, (Ozempic, 0.25 or 0.5 MG/DOSE,) 2 MG/1.5ML solution pen-injector; Take 0.25 mg weekly for one month then, increase to 0.5 mg weekly.  Dispense: 1 pen; Refill: 2           Follow Up   Return in about 6 weeks (around 9/15/2022).  Patient was given instructions and counseling regarding his condition or for health maintenance advice. Please see specific information pulled into the AVS if appropriate.

## 2022-08-04 NOTE — ASSESSMENT & PLAN NOTE
Patient's (Body mass index is 32.87 kg/m².) indicates that they are obese (BMI >30) with health conditions that include hypertension, diabetes mellitus and dyslipidemias . Weight is unchanged. BMI is is above average; BMI management plan is completed. We discussed low calorie, low carb based diet program, portion control and increasing exercise.

## 2022-08-29 RX ORDER — FENOFIBRATE 145 MG/1
TABLET, COATED ORAL
Qty: 90 TABLET | Refills: 1 | Status: SHIPPED | OUTPATIENT
Start: 2022-08-29 | End: 2022-12-02

## 2022-09-12 ENCOUNTER — OFFICE VISIT (OUTPATIENT)
Dept: FAMILY MEDICINE CLINIC | Facility: CLINIC | Age: 67
End: 2022-09-12

## 2022-09-12 ENCOUNTER — OFFICE VISIT (OUTPATIENT)
Dept: UROLOGY | Facility: CLINIC | Age: 67
End: 2022-09-12

## 2022-09-12 VITALS
RESPIRATION RATE: 18 BRPM | TEMPERATURE: 97.3 F | HEIGHT: 74 IN | HEART RATE: 88 BPM | DIASTOLIC BLOOD PRESSURE: 75 MMHG | SYSTOLIC BLOOD PRESSURE: 113 MMHG | OXYGEN SATURATION: 92 % | WEIGHT: 248.3 LBS | BODY MASS INDEX: 31.87 KG/M2

## 2022-09-12 VITALS — WEIGHT: 250 LBS | BODY MASS INDEX: 32.08 KG/M2 | HEART RATE: 109 BPM | HEIGHT: 74 IN

## 2022-09-12 DIAGNOSIS — Z00.00 MEDICARE ANNUAL WELLNESS VISIT, INITIAL: Primary | ICD-10-CM

## 2022-09-12 DIAGNOSIS — E66.09 CLASS 1 OBESITY DUE TO EXCESS CALORIES WITH SERIOUS COMORBIDITY AND BODY MASS INDEX (BMI) OF 32.0 TO 32.9 IN ADULT: Chronic | ICD-10-CM

## 2022-09-12 DIAGNOSIS — N40.0 BENIGN PROSTATIC HYPERPLASIA WITHOUT LOWER URINARY TRACT SYMPTOMS: Primary | ICD-10-CM

## 2022-09-12 DIAGNOSIS — E11.43 TYPE 2 DIABETES MELLITUS WITH DIABETIC AUTONOMIC NEUROPATHY, WITHOUT LONG-TERM CURRENT USE OF INSULIN: Chronic | ICD-10-CM

## 2022-09-12 LAB
BILIRUB BLD-MCNC: NEGATIVE MG/DL
CLARITY, POC: CLEAR
COLOR UR: YELLOW
EXPIRATION DATE: ABNORMAL
GLUCOSE UR STRIP-MCNC: ABNORMAL MG/DL
KETONES UR QL: NEGATIVE
LEUKOCYTE EST, POC: NEGATIVE
Lab: ABNORMAL
NITRITE UR-MCNC: NEGATIVE MG/ML
PH UR: 5.5 [PH] (ref 5–8)
PROT UR STRIP-MCNC: NEGATIVE MG/DL
RBC # UR STRIP: NEGATIVE /UL
SP GR UR: 1.01 (ref 1–1.03)
SPECIMEN VOL SMN: 1 ML
UROBILINOGEN UR QL: ABNORMAL

## 2022-09-12 PROCEDURE — 1125F AMNT PAIN NOTED PAIN PRSNT: CPT | Performed by: FAMILY MEDICINE

## 2022-09-12 PROCEDURE — 99214 OFFICE O/P EST MOD 30 MIN: CPT | Performed by: NURSE PRACTITIONER

## 2022-09-12 PROCEDURE — 81003 URINALYSIS AUTO W/O SCOPE: CPT | Performed by: NURSE PRACTITIONER

## 2022-09-12 PROCEDURE — 1159F MED LIST DOCD IN RCRD: CPT | Performed by: FAMILY MEDICINE

## 2022-09-12 PROCEDURE — G0439 PPPS, SUBSEQ VISIT: HCPCS | Performed by: FAMILY MEDICINE

## 2022-09-12 PROCEDURE — 51798 US URINE CAPACITY MEASURE: CPT | Performed by: NURSE PRACTITIONER

## 2022-09-12 PROCEDURE — 1170F FXNL STATUS ASSESSED: CPT | Performed by: FAMILY MEDICINE

## 2022-09-12 RX ORDER — CELECOXIB 100 MG/1
100 CAPSULE ORAL DAILY
COMMUNITY
Start: 2022-09-06

## 2022-09-12 RX ORDER — TAMSULOSIN HYDROCHLORIDE 0.4 MG/1
1 CAPSULE ORAL DAILY
Qty: 90 CAPSULE | Refills: 0 | Status: SHIPPED | OUTPATIENT
Start: 2022-09-12 | End: 2022-12-11

## 2022-09-12 NOTE — ASSESSMENT & PLAN NOTE
Patient's (Body mass index is 31.87 kg/m².) indicates that they are obese (BMI >30) with health conditions that include hypertension, diabetes mellitus and dyslipidemias . Weight is improving with lifestyle modifications. BMI is is above average; BMI management plan is completed. We discussed low calorie, low carb based diet program, portion control and increasing exercise.

## 2022-09-12 NOTE — PROGRESS NOTES
The ABCs of the Annual Wellness Visit  Welcome to Medicare Visit    Chief Complaint   Patient presents with   • Welcome To Medicare   • Referal to Suzanna Agosto      Subjective {   History of Present Illness:  Chaparro Weber is a 67 y.o. male who presents for a  Welcome to Medicare Visit.    The following portions of the patient's history were reviewed and   updated as appropriate: problem list.     Compared to one year ago, the patient feels his physical   health is worse.    Compared to one year ago, the patient feels his mental   health is the same.    Recent Hospitalizations:  He was not admitted to the hospital during the last year.       Current Medical Providers:  Patient Care Team:  Charlotte Kulkarni DO as PCP - General (Family Medicine)  Daniela Hay APRN as Nurse Practitioner (Nurse Practitioner)    Outpatient Medications Prior to Visit   Medication Sig Dispense Refill   • aspirin  MG tablet Take 325 mg by mouth Daily.     • celecoxib (CeleBREX) 100 MG capsule      • cholecalciferol (VITAMIN D3) 25 MCG (1000 UT) tablet Take 2,000 Units by mouth Daily.     • Diclofenac Sodium (VOLTAREN) 1 % gel gel      • escitalopram (LEXAPRO) 20 MG tablet TAKE ONE TABLET BY MOUTH ONCE DAILY 90 tablet 1   • fenofibrate (TRICOR) 145 MG tablet TAKE ONE TABLET BY MOUTH ONCE DAILY 90 tablet 1   • glimepiride (Amaryl) 4 MG tablet Take 1 tablet for one week. Then if blood sugar is above 160 in am fasting increase to 2 tablet daily. 60 tablet 5   • HYDROcodone-acetaminophen (NORCO) 7.5-325 MG per tablet hydrocodone 7.5 mg-acetaminophen 325 mg tablet     • ibuprofen (ADVIL,MOTRIN) 200 MG tablet Take 200 mg by mouth Every 6 (Six) Hours As Needed for Mild Pain .     • Inulin (FIBER CHOICE PO) Take  by mouth.     • Januvia 100 MG tablet      • methylcellulose, Laxative, (CITRUCEL) 500 MG tablet tablet Citrucel 500 mg oral tablet take 1 tablet by oral route daily   Active     • metoprolol succinate XL (TOPROL-XL) 25 MG 24 hr  tablet Take 1 tablet by mouth Daily.     • pregabalin (LYRICA) 300 MG capsule      • psyllium (Metamucil) 0.52 g capsule      • valsartan (DIOVAN) 160 MG tablet TAKE ONE TABLET BY MOUTH ONCE DAILY FOR BLOOD PRESSURE 90 tablet 0   • vitamin B-12 (CYANOCOBALAMIN) 1000 MCG tablet Take 1,200 mcg by mouth Daily.     • vitamin E 100 UNIT capsule Take 100 Units by mouth Daily.     • Zinc 50 MG capsule Take  by mouth.     • Semaglutide,0.25 or 0.5MG/DOS, (Ozempic, 0.25 or 0.5 MG/DOSE,) 2 MG/1.5ML solution pen-injector Take 0.25 mg weekly for one month then, increase to 0.5 mg weekly. 1 pen 2   • metFORMIN ER (Glucophage XR) 500 MG 24 hr tablet Take 1 tablet by mouth Daily With Breakfast. 180 tablet 1   • traZODone (DESYREL) 50 MG tablet Take 1 tablet nightly for a week for insomnia. May increase to 2 tablets in one week. After another week can increase to 3 tablets. 90 tablet 2     No facility-administered medications prior to visit.       Opioid medication/s are on active medication list.  and I have evaluated his active treatment plan and pain score trends (see table).  Vitals:    09/12/22 0809   PainSc:   4     I have reviewed the chart for potential of high risk medication and harmful drug interactions in the elderly.            Aspirin is on active medication list. Aspirin use is indicated based on review of current medical condition/s. Pros and cons of this therapy have been discussed today. Benefits of this medication outweigh potential harm.  Patient has been encouraged to continue taking this medication.  .      Patient Active Problem List   Diagnosis   • Acquired polycythemia   • Anxiety   • Arthritis   • Asthma   • COPD (chronic obstructive pulmonary disease) (HCC)   • Diabetic peripheral neuropathy (HCC)   • NICM (nonischemic cardiomyopathy) (HCC)   • Left bundle branch block   • Congestive heart failure (HCC)   • Kidney stones   • Hyperlipidemia   • GERD (gastroesophageal reflux disease)   • Essential  "hypertension   • Heart disease   • Type 2 diabetes mellitus with autonomic neuropathy (HCC)   • Class 1 obesity due to excess calories with serious comorbidity and body mass index (BMI) of 32.0 to 32.9 in adult     Advance Care Planning  Advance Directive is not on file.  ACP discussion was held with the patient during this visit. Patient has an advance directive (not in EMR), copy requested.          Objective      Vitals:    09/12/22 0809   BP: 113/75   BP Location: Left arm   Patient Position: Sitting   Pulse: 88   Resp: 18   Temp: 97.3 °F (36.3 °C)   SpO2: 92%   Weight: 113 kg (248 lb 4.8 oz)   Height: 188 cm (74.02\")   PainSc:   4     Estimated body mass index is 31.87 kg/m² as calculated from the following:    Height as of this encounter: 188 cm (74.02\").    Weight as of this encounter: 113 kg (248 lb 4.8 oz).    BMI is >= 30 and <35. (Class 1 Obesity). The following options were offered after discussion;: weight loss educational material (shared in after visit summary), exercise counseling/recommendations and nutrition counseling/recommendations      Does the patient have evidence of cognitive impairment? No    Physical Exam  Vitals reviewed.   Constitutional:       Appearance: Normal appearance. He is well-developed.   HENT:      Head: Normocephalic and atraumatic.      Right Ear: External ear normal.      Left Ear: External ear normal.      Mouth/Throat:      Pharynx: No oropharyngeal exudate.   Eyes:      Conjunctiva/sclera: Conjunctivae normal.      Pupils: Pupils are equal, round, and reactive to light.   Neck:      Vascular: No carotid bruit.   Cardiovascular:      Rate and Rhythm: Normal rate and regular rhythm.      Heart sounds: No murmur heard.    No friction rub. No gallop.   Pulmonary:      Effort: Pulmonary effort is normal.      Breath sounds: Normal breath sounds. No wheezing or rhonchi.   Abdominal:      General: There is no distension.   Skin:     General: Skin is warm and dry. "   Neurological:      Mental Status: He is alert and oriented to person, place, and time.      Cranial Nerves: No cranial nerve deficit.      Motor: No weakness.   Psychiatric:         Mood and Affect: Mood and affect normal.         Behavior: Behavior normal.         Thought Content: Thought content normal.         Judgment: Judgment normal.         Lab Results   Component Value Date    TRIG 817 (H) 2022    HDL 22 (L) 2022    LDL  2022      Comment:      Unable to calculate    LDL 31 2022    VLDL  2022      Comment:      Unable to calculate    HGBA1C 10.20 (H) 2022       Procedures       HEALTH RISK ASSESSMENT    Smoking Status:  Social History     Tobacco Use   Smoking Status Former Smoker   • Packs/day: 1.50   • Years: 40.00   • Pack years: 60.00   • Types: Cigarettes   • Start date: 1965   • Quit date:    • Years since quittin.7   Smokeless Tobacco Never Used   Tobacco Comment    no second hand smoke exposure     Alcohol Consumption:  Social History     Substance and Sexual Activity   Alcohol Use Never       Fall Risk Screen:    [place] Fall Risk Assessment was completed, and patient is at MODERATE risk for falls. Assessment completed on:2022    Depression Screen:   PHQ-2/PHQ-9 Depression Screening 2022   Retired PHQ-9 Total Score -   Retired Total Score -   Little Interest or Pleasure in Doing Things 0-->not at all   Feeling Down, Depressed or Hopeless 0-->not at all   PHQ-9: Brief Depression Severity Measure Score 0       Health Habits and Functional and Cognitive Screening:  Functional & Cognitive Status 2022   Do you have difficulty preparing food and eating? No   Do you have difficulty bathing yourself, getting dressed or grooming yourself? No   Do you have difficulty using the toilet? No   Do you have difficulty moving around from place to place? No   Do you have trouble with steps or getting out of a bed or a chair? No   Current Diet Other    Dental Exam Up to date   Eye Exam Not up to date   Exercise (times per week) 5 times per week   Current Exercises Include Walking   Do you need help using the phone?  No   Are you deaf or do you have serious difficulty hearing?  No   Do you need help with transportation? No   Do you need help shopping? No   Do you need help preparing meals?  No   Do you need help with housework?  No   Do you need help with laundry? No   Do you need help taking your medications? Yes   Do you need help managing money? No   Do you ever drive or ride in a car without wearing a seat belt? Yes       Visual Acuity:    No exam data present    Age-appropriate Screening Schedule:  Refer to the list below for future screening recommendations based on patient's age, sex and/or medical conditions. Orders for these recommended tests are listed in the plan section. The patient has been provided with a written plan.    Health Maintenance   Topic Date Due   • DIABETIC EYE EXAM  09/12/2022 (Originally 1/16/2022)   • ZOSTER VACCINE (1 of 2) 11/30/2023 (Originally 8/26/2007)   • INFLUENZA VACCINE  10/01/2022   • HEMOGLOBIN A1C  01/29/2023   • DIABETIC FOOT EXAM  04/29/2023   • LIPID PANEL  07/29/2023   • URINE MICROALBUMIN  07/29/2023   • TDAP/TD VACCINES (2 - Td or Tdap) 07/16/2029          Assessment & Plan   CMS Preventative Services Quick Reference  Risk Factors Identified During Encounter  Cardiovascular Disease  Obesity/Overweight   The above risks/problems have been discussed with the patient.  Pertinent information has been shared with the patient in the After Visit Summary.  Follow up plans and orders are seen below in the Assessment/Plan Section.    Diagnoses and all orders for this visit:    1. Medicare annual wellness visit, initial (Primary)    2. Type 2 diabetes mellitus with diabetic autonomic neuropathy, without long-term current use of insulin (HCC)  -     Ambulatory Referral to Endocrinology    3. Class 1 obesity due to excess  calories with serious comorbidity and body mass index (BMI) of 32.0 to 32.9 in adult  Assessment & Plan:  Patient's (Body mass index is 31.87 kg/m².) indicates that they are obese (BMI >30) with health conditions that include hypertension, diabetes mellitus and dyslipidemias . Weight is improving with lifestyle modifications. BMI is is above average; BMI management plan is completed. We discussed low calorie, low carb based diet program, portion control and increasing exercise.         Follow Up:   No follow-ups on file.     An After Visit Summary and PPPS were made available to the patient.

## 2022-09-13 NOTE — PROGRESS NOTES
Chief Complaint: Benign Prostatic Hypertrophy    Subjective      BPH       History of Present Illness  Chaparro Weber is a 67 y.o. male presents to [facility] for BPH.    Patient presents today on referral from Dr. Charlotte Kulkarni for BPH.    Patient admits to urinary frequency and urgency.  Denies any dysuria.    Admits to a slow urinary stream associated with spitting.  Patient reports that he sits down to void now, straining to empty.    Admits to nocturia 1-2X/night.    Denies any penis or scrotum pain.  Admits to ED issues with initiating and maintaining.    Patient reports that his testosterone was low and was recommended he start testosterone.  Patient refused, reports he did not like the way it made him feel.    Denies taking any BPH medications.    Denies taking any blood thinners.    Denies any family history of prostate cancer.    Previous psa's:  7/22: 0.014   7/21: 0.014  9/20: 0.01    Results for orders placed or performed in visit on 09/12/22   Bladder Scan   Result Value Ref Range    Volume: 1    POC Urinalysis Dipstick, Automated    Specimen: Urine   Result Value Ref Range    Color Yellow Yellow, Straw, Dark Yellow, Salma    Clarity, UA Clear Clear    Specific Gravity  1.015 1.005 - 1.030    pH, Urine 5.5 5.0 - 8.0    Leukocytes Negative Negative    Nitrite, UA Negative Negative    Protein, POC Negative Negative mg/dL    Glucose, UA >=1000 mg/dL (3+) (A) Negative mg/dL    Ketones, UA Negative Negative    Urobilinogen, UA 0.2 E.U./dL Normal, 0.2 E.U./dL    Bilirubin Negative Negative    Blood, UA Negative Negative    Lot Number [id number]     Expiration Date 8/2,023        Objective     Past Medical History:   Diagnosis Date   • Anxiety    • Arthritis    • Asthma    • Chest pain    • CHF (congestive heart failure) ([facility])    • Condition not found     Hernia-Unspecified   • COPD (chronic obstructive pulmonary disease) ([facility])    • Deafness    • Diabetes ([facility])    • HBP (high blood  pressure)    • Heart disease    • High cholesterol    • Kidney stones    • Lung disease    • Night sweats    • Ringing in ear    • Sleep apnea    • Type 2 diabetes mellitus with autonomic neuropathy (HCC) 07/16/2021       Past Surgical History:   Procedure Laterality Date   • APPENDECTOMY     • BACK SURGERY  2000,2010    x2   • CARDIAC DEFIBRILLATOR PLACEMENT     • COLONOSCOPY     • COLONOSCOPY N/A 6/22/2022    Procedure: COLONOSCOPY;  Surgeon: Nicholas Townsend MD;  Location: [facility] ENDOSCOPY;  Service: General;  Laterality: N/A;  DIVERTICULOSIS   • GALLBLADDER SURGERY  2016   • KIDNEY SURGERY     • NECK SURGERY  2005   • PACEMAKER IMPLANTATION         Outpatient Medications Marked as Taking for the 9/12/22 encounter (Office Visit) with Satnam April, APRN   Medication Sig Dispense Refill   • aspirin  MG tablet Take 325 mg by mouth Daily.     • celecoxib (CeleBREX) 100 MG capsule      • cholecalciferol (VITAMIN D3) 25 MCG (1000 UT) tablet Take 2,000 Units by mouth Daily.     • Diclofenac Sodium (VOLTAREN) 1 % gel gel      • escitalopram (LEXAPRO) 20 MG tablet TAKE ONE TABLET BY MOUTH ONCE DAILY 90 tablet 1   • fenofibrate (TRICOR) 145 MG tablet TAKE ONE TABLET BY MOUTH ONCE DAILY 90 tablet 1   • glimepiride (Amaryl) 4 MG tablet Take 1 tablet for one week. Then if blood sugar is above 160 in am fasting increase to 2 tablet daily. 60 tablet 5   • HYDROcodone-acetaminophen (NORCO) 7.5-325 MG per tablet hydrocodone 7.5 mg-acetaminophen 325 mg tablet     • ibuprofen (ADVIL,MOTRIN) 200 MG tablet Take 200 mg by mouth Every 6 (Six) Hours As Needed for Mild Pain .     • Inulin (FIBER CHOICE PO) Take  by mouth.     • Januvia 100 MG tablet      • methylcellulose, Laxative, (CITRUCEL) 500 MG tablet tablet Citrucel 500 mg oral tablet take 1 tablet by oral route daily   Active     • metoprolol succinate XL (TOPROL-XL) 25 MG 24 hr tablet Take 1 tablet by mouth Daily.     • pregabalin (LYRICA) 300 MG capsule      • psyllium  "(Metamucil) 0.52 g capsule      • valsartan (DIOVAN) 160 MG tablet TAKE ONE TABLET BY MOUTH ONCE DAILY FOR BLOOD PRESSURE 90 tablet 0   • vitamin B-12 (CYANOCOBALAMIN) 1000 MCG tablet Take 1,200 mcg by mouth Daily.     • vitamin E 100 UNIT capsule Take 100 Units by mouth Daily.     • Zinc 50 MG capsule Take  by mouth.         Allergies   Allergen Reactions   • Codeine Nausea Only and Nausea And Vomiting        Family History   Problem Relation Age of Onset   • Other Mother 60        Renal Cancer   • Prostate cancer Father    • Colon cancer Father 70   • Malig Hyperthermia Neg Hx        Social History     Socioeconomic History   • Marital status:    Tobacco Use   • Smoking status: Former Smoker     Packs/day: 1.50     Years: 40.00     Pack years: 60.00     Types: Cigarettes     Start date: 1965     Quit date:      Years since quittin.7   • Smokeless tobacco: Never Used   • Tobacco comment: no second hand smoke exposure   Vaping Use   • Vaping Use: Never used   Substance and Sexual Activity   • Alcohol use: Never   • Drug use: Never   • Sexual activity: Defer       Review of Systems   Constitutional: Negative for chills and fever.   Genitourinary: Positive for decreased libido, frequency, nocturia, erectile dysfunction and urgency. Negative for decreased urine volume, difficulty urinating, discharge, dysuria, flank pain, genital sores, hematuria, penile pain, penile swelling, scrotal swelling, testicular pain and urinary incontinence.        Vital Signs:   Pulse 109   Ht 188 cm (74\")   Wt 113 kg (250 lb)   BMI 32.10 kg/m²      Physical Exam  Vitals and nursing note reviewed.   Constitutional:       General: He is not in acute distress.     Appearance: Normal appearance.   HENT:      Head: Normocephalic.   Cardiovascular:      Rate and Rhythm: Normal rate.   Pulmonary:      Effort: Pulmonary effort is normal.      Breath sounds: No stridor. No wheezing.   Abdominal:      Palpations: Abdomen is " soft.      Tenderness: There is no guarding.   Genitourinary:     Comments: Bladder Scan interpretation     Estimation of residual urine via DNS:NetI 3000 VerSovran Self Storageon Bladder Scan  MA/nurse performing: Aron SMALL  Residual Urine: 1 ml  Indication: Benign prostatic hyperplasia without lower urinary tract symptoms  (primary encounter diagnosis)   Position: Supine  Examination: Incremental scanning of the suprapubic area using 2.0 MHz transducer using copious amounts of acoustic gel.   Findings: An anechoic area was demonstrated which represented the bladder, with measurement of residual urine as noted. I inspected this myself. In that the residual urine was insignificant, refer to plan for treatment and plan necessary at this time.         Musculoskeletal:         General: No deformity. Normal range of motion.   Skin:     General: Skin is warm and dry.      Coloration: Skin is not jaundiced.   Neurological:      General: No focal deficit present.      Mental Status: He is alert and oriented to person, place, and time.   Psychiatric:         Mood and Affect: Mood normal.         Thought Content: Thought content normal.          Result Review :          []  Laboratory  []  Radiology  []  Pathology  []  Microbiology  []  EKG/Telemetry   []  Cardiology/Vascular   [x]  Old records  Today I reviewed Dr. Kulkarni's previous office note.     Assessment and Plan    Diagnoses and all orders for this visit:    1. Benign prostatic hyperplasia without lower urinary tract symptoms (Primary)  -     POC Urinalysis Dipstick, Automated  -     Bladder Scan  -     Cancel: Bladder Scan  -     tamsulosin (FLOMAX) 0.4 MG capsule 24 hr capsule; Take 1 capsule by mouth Daily for 90 days.  Dispense: 90 capsule; Refill: 0        Follow Up   Return for Follow-up with me in 3 months for medication check.     BPH with Luts- behavioral modifications including fluid management, limiting fluids prior to sleep, and voiding immediately prior to sleep.       Consider workup for RYLEE per PCP as studies have shown that with treatment of sleep apnea, nocuria can be significantly reduced.      Continue conservative management of BPH Luts via behavioral modifications and medications; monitor for adverse outcomes of BPH which would warrant discussion of further management.     Start flomax 0.4mg/daily; side effects discussed; aware that it may take 3-4 months to have complete effect so encouraged patient to continue to take to ensure adequate trial of medication.    Patient was given instructions and counseling regarding his condition or for health maintenance advice. Please see specific information pulled into the AVS if appropriate.

## 2022-09-22 RX ORDER — VALSARTAN 160 MG/1
TABLET ORAL
Qty: 90 TABLET | Refills: 0 | Status: SHIPPED | OUTPATIENT
Start: 2022-09-22 | End: 2022-12-23

## 2022-10-17 RX ORDER — ESCITALOPRAM OXALATE 20 MG/1
TABLET ORAL
Qty: 90 TABLET | Refills: 1 | Status: SHIPPED | OUTPATIENT
Start: 2022-10-17

## 2022-11-08 RX ORDER — SITAGLIPTIN 100 MG/1
TABLET, FILM COATED ORAL
Qty: 30 TABLET | Refills: 5 | Status: SHIPPED | OUTPATIENT
Start: 2022-11-08 | End: 2022-12-08

## 2022-11-22 ENCOUNTER — HOSPITAL ENCOUNTER (OUTPATIENT)
Dept: GENERAL RADIOLOGY | Facility: HOSPITAL | Age: 67
Discharge: HOME | End: 2022-11-22
Admitting: NURSE PRACTITIONER

## 2022-11-22 ENCOUNTER — TRANSCRIBE ORDERS (OUTPATIENT)
Dept: ADMINISTRATIVE | Facility: HOSPITAL | Age: 67
End: 2022-11-22

## 2022-11-22 DIAGNOSIS — M47.817 SPONDYLOSIS WITHOUT MYELOPATHY OR RADICULOPATHY, LUMBOSACRAL REGION: ICD-10-CM

## 2022-11-22 DIAGNOSIS — M47.817 SPONDYLOSIS WITHOUT MYELOPATHY OR RADICULOPATHY, LUMBOSACRAL REGION: Primary | ICD-10-CM

## 2022-11-22 PROCEDURE — 72110 X-RAY EXAM L-2 SPINE 4/>VWS: CPT

## 2022-11-29 ENCOUNTER — TRANSCRIBE ORDERS (OUTPATIENT)
Dept: ADMINISTRATIVE | Facility: HOSPITAL | Age: 67
End: 2022-11-29

## 2022-11-29 DIAGNOSIS — M47.816 LUMBAR SPONDYLOSIS: Primary | ICD-10-CM

## 2022-12-02 RX ORDER — FENOFIBRATE 145 MG/1
TABLET, COATED ORAL
Qty: 90 TABLET | Refills: 1 | Status: SHIPPED | OUTPATIENT
Start: 2022-12-02

## 2022-12-08 ENCOUNTER — OFFICE VISIT (OUTPATIENT)
Dept: DIABETES SERVICES | Facility: HOSPITAL | Age: 67
End: 2022-12-08
Payer: MEDICARE

## 2022-12-08 VITALS
SYSTOLIC BLOOD PRESSURE: 112 MMHG | HEART RATE: 83 BPM | OXYGEN SATURATION: 94 % | HEIGHT: 74 IN | TEMPERATURE: 97.5 F | WEIGHT: 258 LBS | DIASTOLIC BLOOD PRESSURE: 65 MMHG | BODY MASS INDEX: 33.11 KG/M2

## 2022-12-08 DIAGNOSIS — E11.22 TYPE 2 DIABETES MELLITUS WITH STAGE 2 CHRONIC KIDNEY DISEASE, WITH LONG-TERM CURRENT USE OF INSULIN: ICD-10-CM

## 2022-12-08 DIAGNOSIS — Z79.4 TYPE 2 DIABETES MELLITUS WITH STAGE 2 CHRONIC KIDNEY DISEASE, WITH LONG-TERM CURRENT USE OF INSULIN: ICD-10-CM

## 2022-12-08 DIAGNOSIS — E66.9 OBESITY (BMI 30-39.9): ICD-10-CM

## 2022-12-08 DIAGNOSIS — E11.65 UNCONTROLLED TYPE 2 DIABETES MELLITUS WITH HYPERGLYCEMIA: Primary | ICD-10-CM

## 2022-12-08 DIAGNOSIS — N18.2 TYPE 2 DIABETES MELLITUS WITH STAGE 2 CHRONIC KIDNEY DISEASE, WITH LONG-TERM CURRENT USE OF INSULIN: ICD-10-CM

## 2022-12-08 DIAGNOSIS — E11.40 TYPE 2 DIABETES MELLITUS WITH DIABETIC NEUROPATHY, WITH LONG-TERM CURRENT USE OF INSULIN: ICD-10-CM

## 2022-12-08 DIAGNOSIS — Z79.4 TYPE 2 DIABETES MELLITUS WITH DIABETIC NEUROPATHY, WITH LONG-TERM CURRENT USE OF INSULIN: ICD-10-CM

## 2022-12-08 LAB
EXPIRATION DATE: ABNORMAL
GLUCOSE BLDC GLUCOMTR-MCNC: 342 MG/DL (ref 70–99)
HBA1C MFR BLD: 11.8 %
Lab: ABNORMAL

## 2022-12-08 PROCEDURE — 82962 GLUCOSE BLOOD TEST: CPT | Performed by: NURSE PRACTITIONER

## 2022-12-08 PROCEDURE — G0463 HOSPITAL OUTPT CLINIC VISIT: HCPCS | Performed by: NURSE PRACTITIONER

## 2022-12-08 PROCEDURE — 99204 OFFICE O/P NEW MOD 45 MIN: CPT | Performed by: NURSE PRACTITIONER

## 2022-12-08 RX ORDER — DULAGLUTIDE 0.75 MG/.5ML
0.75 INJECTION, SOLUTION SUBCUTANEOUS
Qty: 2 ML | Refills: 0 | COMMUNITY
Start: 2022-12-08 | End: 2022-12-30

## 2022-12-08 RX ORDER — BLOOD SUGAR DIAGNOSTIC
1 STRIP MISCELLANEOUS NIGHTLY
Qty: 100 EACH | Refills: 1 | Status: SHIPPED | OUTPATIENT
Start: 2022-12-08

## 2022-12-08 RX ORDER — METFORMIN HYDROCHLORIDE 500 MG/1
500 TABLET, EXTENDED RELEASE ORAL DAILY
COMMUNITY
End: 2022-12-29

## 2022-12-08 RX ORDER — INSULIN GLARGINE 300 U/ML
20 INJECTION, SOLUTION SUBCUTANEOUS NIGHTLY
Qty: 3 ML | Refills: 0 | COMMUNITY
Start: 2022-12-08

## 2022-12-08 RX ORDER — METFORMIN HYDROCHLORIDE 500 MG/1
500 TABLET, EXTENDED RELEASE ORAL
COMMUNITY
End: 2022-12-29

## 2022-12-08 NOTE — PROGRESS NOTES
Chief Complaint  Diabetes (New pt, est care with diabetic provider, med mgt, a1c eval )    Referred By: DO Zander Guevara presents to [facility] for diabetes medication management    History of Present Illness    Visit type:  to establish care  Diabetes type:  Type 2  Age at time of dx/Year of dx/Number of years:  7-8 years  Family History of Diabetes: none  Current diabetes status/concerns/issues: He presents to the office today for assistance in managing his diabetes.  He requested referral from his primary care provider.  He has been struggling with high glucose levels more so recently.  He was previously much more active able to play pickle ball but because of back issues he had to discontinue this activity and now his glucose levels are much more elevated.  Other current health concerns: He has lumbar spine issues and has had surgery twice.  He plans to return to his surgeon for further evaluation because he has had worsening of his symptoms most recently.  Diabetes symptoms:    Polyuria: No   Polydipsia: Yes   Polyphagia: No   Blurred vision: Yes   Excessive fatigue: Yes  Diabetes complications:  Neuro: He describes some neuropathy symptoms primarily affecting the bottom of his feet.  He states he has some tingling itchy feelings  Renal: Stage II renal disease  Eyes: None  Amputation/Wounds: None  GI: None  Cardiovascular: Hypertension, hypertriglyceridemia, congestive heart failure, cardiomyopathy  ED: None  Other: None  Hospitalizations/ED/911 secondary to DM?  No  Hypoglycemia:  None reported at this time  Hypoglycemia Symptoms:  No hypoglycemia at this time  Current Diabetes treatment: Glimepiride 4 mg taking 2 tablets every morning, Januvia 100 mg once a day, Metformin 500 mg once a day  Prior diabetes treatments: Bydureon which was too costly for the patient  Blood glucose device:  Meter  Blood glucose monitoring frequency:  He was  typically randomly testing his blood sugar once a week but in preparation for this visit he did increase his testing  Blood glucose range/average: He brings glucose logs to the appointment today that show glucose levels consistently in the 300s with some glucose levels greater than 400.  Dietary behavior:  Limits high carb/sweet foods, Avoids sugary drinks, Number of meals each day - A light breakfast and lunch and then 1 main meal at suppertime; Number of snacks each day - Occasional, History of Diabetes Nutrition Counseling - NO; he does like to eat rice  Activity/Exercise:  decreased due to back issues  Last Eye Exam: October 2022; Location: [facility]  Last Foot Exam: None  Diabetes Education Hx: None  Social Determinants of Health: Financial concerns; he is on a fixed income    Past Medical History:   Diagnosis Date   • Anxiety    • Arthritis    • Asthma     - PT DENIED ASTHMA- NO INHALERS   • Bradycardia     DEFBIRLLATOR/ PACEMAKER- MEDITRONIC- SEE'S DR. HAMEED DENIED CP/SOB   • Chest pain     NO RECENT CP   • CHF (congestive heart failure) (HCC)     ASYMPTOMATIC   • Condition not found     Hernia-Unspecified   • Coronary artery disease    • Deafness    • Diabetes (HCC)     BG RUNS AROUND 250-300 IN AM   • HBP (high blood pressure)    • Heart disease    • High cholesterol    • Kidney stones    • Lung disease    • Night sweats    • Ringing in ear    • Sleep apnea    • Type 2 diabetes mellitus with autonomic neuropathy (HCC) 07/16/2021     Past Surgical History:   Procedure Laterality Date   • APPENDECTOMY     • BACK SURGERY  2000,2010    lumbar with hardware   • CARDIAC DEFIBRILLATOR PLACEMENT      MEDITRONIC   • CHOLECYSTECTOMY     • COLONOSCOPY N/A 06/22/2022    Procedure: COLONOSCOPY;  Surgeon: Nicholas Townsend MD;  Location: [facility] ENDOSCOPY;  Service: General;  Laterality: N/A;  DIVERTICULOSIS   • CYSTOSCOPY W/ STONE MANIPULATION Right 1/5/2023    Procedure: CYSTOSCOPY KIDNEY STONE MANIPULATION/EXTRACTION;   Surgeon: Ann Mon MD;  Location: [facility] OR;  Service: Urology;  Laterality: Right;   • KIDNEY STONE SURGERY     • NECK SURGERY      removal of bone spurs   • PACEMAKER IMPLANTATION      10 YEARS AGO PLACED- [place]   • URETEROSCOPY LASER LITHOTRIPSY WITH STENT INSERTION Left 2023    Procedure: LEFT URETEROSCOPY LASER LITHOTRIPSY WITH STENT INSERTION AND RETROGRADE; RIGHT URETEROSCOPY WITH STENT INSERTION AND RETROGRADE;  Surgeon: Ann Mon MD;  Location: [facility] MAIN OR;  Service: Urology;  Laterality: Left;     Family History   Problem Relation Age of Onset   • Other Mother 60        Renal Cancer   • Prostate cancer Father    • Colon cancer Father 70   • Malig Hyperthermia Neg Hx      Social History     Socioeconomic History   • Marital status:    • Number of children: 2   Tobacco Use   • Smoking status: Former     Packs/day: 1.50     Years: 40.00     Pack years: 60.00     Types: Cigarettes     Start date: 1965     Quit date:      Years since quittin.0   • Smokeless tobacco: Never   • Tobacco comments:     no second hand smoke exposure   Vaping Use   • Vaping Use: Never used   Substance and Sexual Activity   • Alcohol use: Never   • Drug use: Never   • Sexual activity: Defer     Allergies   Allergen Reactions   • Codeine Nausea Only and Nausea And Vomiting       Current Outpatient Medications:   •  aspirin  MG tablet, Take 325 mg by mouth Daily. PT STATED DR MON OFFICE INSTRUCTED TO STOP 5 DAYS PRIOR TO SURGERY, LAST DOSE PT STATED INSTRUCTED TO TAKE IS 22, Disp: , Rfl:   •  celecoxib (CeleBREX) 100 MG capsule, Take 100 mg by mouth Daily., Disp: , Rfl:   •  cholecalciferol (VITAMIN D3) 25 MCG (1000 UT) tablet, Take 2,000 Units by mouth Daily., Disp: , Rfl:   •  Diclofenac Sodium (VOLTAREN) 1 % gel gel, , Disp: , Rfl:   •  escitalopram (LEXAPRO) 20 MG tablet, TAKE ONE TABLET BY MOUTH ONCE DAILY, Disp: 90 tablet, Rfl: 1  •  fenofibrate (TRICOR) 145  MG tablet, TAKE ONE TABLET BY MOUTH ONCE DAILY, Disp: 90 tablet, Rfl: 1  •  glimepiride (Amaryl) 4 MG tablet, Take 1 tablet by mouth Daily With Breakfast & Dinner. Take 1 tablet for one week. Then if blood sugar is above 160 in am fasting increase to 2 tablet daily., Disp: 60 tablet, Rfl: 5  •  glucose blood test strip, Use as instructed, Disp: 100 each, Rfl: 2  •  glucose blood test strip, OneTouch Ultra Test strips, Disp: , Rfl:   •  HYDROcodone-acetaminophen (NORCO) 7.5-325 MG per tablet, hydrocodone 7.5 mg-acetaminophen 325 mg tablet, Disp: , Rfl:   •  ibuprofen (ADVIL,MOTRIN) 200 MG tablet, Take 200 mg by mouth Every 6 (Six) Hours As Needed for Mild Pain ., Disp: , Rfl:   •  metoprolol succinate XL (TOPROL-XL) 25 MG 24 hr tablet, Take 1 tablet by mouth Daily., Disp: , Rfl:   •  pregabalin (LYRICA) 300 MG capsule, Take 300 mg by mouth Daily., Disp: , Rfl:   •  psyllium (Metamucil) 0.52 g capsule, , Disp: , Rfl:   •  vitamin B-12 (CYANOCOBALAMIN) 1000 MCG tablet, Take 1,200 mcg by mouth Daily., Disp: , Rfl:   •  vitamin E 100 UNIT capsule, Take 100 Units by mouth Daily., Disp: , Rfl:   •  Zinc 50 MG capsule, Take  by mouth., Disp: , Rfl:   •  Insulin Glargine, 2 Unit Dial, (Toujeo Max SoloStar) 300 UNIT/ML solution pen-injector injection, Inject 20 Units under the skin into the appropriate area as directed Every Night. (Patient taking differently: Inject 20 Units under the skin into the appropriate area as directed Every Night. INSTRUCTED PER ANESTHESIA STANDING ORDERS), Disp: 3 mL, Rfl: 0  •  Insulin Pen Needle (Pen Needles) 32G X 5 MM misc, 1 each Every Night., Disp: 100 each, Rfl: 1  •  methylcellulose, Laxative, (CITRUCEL) 500 MG tablet tablet, Citrucel 500 mg oral tablet take 1 tablet by oral route daily   Active, Disp: , Rfl:   •  oxyCODONE-acetaminophen (PERCOCET) 5-325 MG per tablet, Take 1-2 tablets by mouth Every 4 (Four) Hours As Needed for Moderate Pain or Severe Pain (Pain)., Disp: 20 tablet, Rfl:  0  •  valsartan (DIOVAN) 160 MG tablet, TAKE ONE TABLET BY MOUTH ONCE DAILY FOR BLOOD PRESSURE, Disp: 90 tablet, Rfl: 0    Review of Systems   Constitutional: Positive for activity change (Decreased) and fatigue. Negative for appetite change, unexpected weight gain and unexpected weight loss.   Eyes: Negative for blurred vision and visual disturbance.   Gastrointestinal: Negative for abdominal pain, constipation, diarrhea, nausea, vomiting, GERD and indigestion.   Endocrine: Positive for polydipsia. Negative for polyphagia and polyuria.   Neurological: Positive for numbness (In the feet).        Objective     Vitals:    12/08/22 1252   BP: 112/65   BP Location: Left arm   Patient Position: Sitting   Cuff Size: Adult   Pulse: 83   Temp: 97.5 °F (36.4 °C)   SpO2: 94%   Weight: 117 kg (258 lb)   Height: 188 cm (74\")   PainSc:   4     Body mass index is 33.13 kg/m².    Physical Exam  Constitutional:       Appearance: Normal appearance. He is obese.      Comments: Obesity with BMI of 31.96   HENT:      Head: Normocephalic and atraumatic.      Right Ear: External ear normal.      Left Ear: External ear normal.      Nose: Nose normal.   Eyes:      Extraocular Movements: Extraocular movements intact.      Conjunctiva/sclera: Conjunctivae normal.   Pulmonary:      Effort: Pulmonary effort is normal.   Musculoskeletal:         General: Normal range of motion.      Cervical back: Normal range of motion.   Skin:     General: Skin is warm and dry.   Neurological:      General: No focal deficit present.      Mental Status: He is alert and oriented to person, place, and time. Mental status is at baseline.   Psychiatric:         Mood and Affect: Mood normal.         Behavior: Behavior normal.         Thought Content: Thought content normal.         Judgment: Judgment normal.         Result Review :   The following data was reviewed by: ANGLE Mcdonnell on 12/08/2022:    Most Recent A1C    HGBA1C Most Recent 12/8/22    Hemoglobin A1C 11.8             A1C Last 3 Results    HGBA1C Last 3 Results 4/25/22 7/29/22 12/8/22   Hemoglobin A1C 9.60 (A) 10.20 (A) 11.8   (A) Abnormal value            Point-of-care A1c in the office today was 11.8% indicating uncontrolled type 2 diabetes.  This is up from a prior result of 10.2 collected in July of this year    Glucose   Date Value Ref Range Status   01/05/2023 241 (H) 70 - 99 mg/dL Final     Comment:     Serial Number: [id number]:  [id number]     Point-of-care glucose in the office today was elevated at 342 mg/dL    Creatinine   Date Value Ref Range Status   07/29/2022 1.10 0.76 - 1.27 mg/dL Final     eGFR   Date Value Ref Range Status   01/05/2023 87.7 >60.0 mL/min/1.73 Final     Comment:     National Kidney Foundation and American Society of Nephrology (ASN) Task Force recommended calculation based on the Chronic Kidney Disease Epidemiology Collaboration (CKD-EPI) equation refit without adjustment for race.   07/29/2022 75.0 >60.0 mL/min/1.73 Final     Comment:     National Kidney Foundation and American Society of Nephrology (ASN) Task Force recommended calculation based on the Chronic Kidney Disease Epidemiology Collaboration (CKD-EPI) equation refit without adjustment for race.     Labs collected on 7/29/2022 show stage II renal disease    Microalbumin, Urine   Date Value Ref Range Status   07/29/2022 <1.2 mg/dL Final     Creatinine, Urine   Date Value Ref Range Status   07/29/2022 50.0 mg/dL Final     Labs collected on 7/29/2022 show normal urine microalbumin    Total Cholesterol   Date Value Ref Range Status   07/29/2022 171 0 - 200 mg/dL Final   01/25/2022 205 (H) 0 - 200 mg/dL Final     Triglycerides   Date Value Ref Range Status   07/29/2022 817 (H) 0 - 150 mg/dL Final   01/25/2022 545 (H) 0 - 150 mg/dL Final     HDL Cholesterol   Date Value Ref Range Status   07/29/2022 22 (L) 40 - 60 mg/dL Final   01/25/2022 27 (L) 40 - 60 mg/dL Final     LDL Cholesterol    Date Value Ref  Range Status   07/29/2022   Final     Comment:     Unable to calculate   07/29/2022 31 0 - 100 mg/dL Final   01/25/2022 88 0 - 100 mg/dL Final     Lipid panel collected on 7/29/2022 shows hypertriglyceridemia            Assessment: The patient's A1c is significantly elevated.  The patient states he would rather be on insulin to have better control of his diabetes than using the pills that he is currently taking.  He was also treated on GLP-1 therapy in the past but it became too costly for the patient but he felt like this medication was helpful.  He does have concerns about his ability to pay for insulin and other medications.      Diagnoses and all orders for this visit:    1. Uncontrolled type 2 diabetes mellitus with hyperglycemia (HCC) (Primary)  -     POC Glucose  -     POC Glycosylated Hemoglobin (Hb A1C)  -     Insulin Pen Needle (Pen Needles) 32G X 5 MM misc; 1 each Every Night.  Dispense: 100 each; Refill: 1  -     Dulaglutide (Trulicity) 0.75 MG/0.5ML solution pen-injector; Inject 0.75 mg under the skin into the appropriate area as directed Every 7 (Seven) Days for 4 doses. (Patient taking differently: Inject 0.75 mg under the skin into the appropriate area as directed Every 7 (Seven) Days. Dispense: 2 mL; Refill: 0  -     Insulin Glargine, 2 Unit Dial, (Toujeo Max SoloStar) 300 UNIT/ML solution pen-injector injection; Inject 20 Units under the skin into the appropriate area as directed Every Night. (Patient taking differently: Inject 20 Units under the skin into the appropriate area as directed Every Night.   Dispense: 3 mL; Refill: 0  -     Ambulatory Referral to [facility]  -     glimepiride (Amaryl) 4 MG tablet; Take 1 tablet by mouth Daily With Breakfast & Dinner. Take 1 tablet for one week. Then if blood sugar is above 160 in am fasting increase to 2 tablet daily.  Dispense: 60 tablet; Refill: 5    2. Type 2 diabetes mellitus with diabetic neuropathy, with long-term current use of  insulin (HCC)    3. Type 2 diabetes mellitus with stage 2 chronic kidney disease, with long-term current use of insulin (HCC)    4. Obesity (BMI 30-39.9)        Plan: We will have the patient change his glimepiride to taking 4 mg 1 tablet before breakfast and 1 tablet before supper.  We will start the patient on Trulicity with 0.75 mg once weekly x4 doses and then if tolerated we will increase to 1.5 mg once weekly.  We will also add Toujeo 20 units once a day to his treatment plan.    Because of the patient's financial concerns we will enroll him in the [employer] patient assistance program for the Trulicity as well as Basaglar insulin which will replace the Toujeo.    The patient will monitor his blood glucose levels 2-3 times each day.  If he develops problematic hyperglycemia or hypoglycemia or adverse drug reactions, he will contact the office for further instructions.        Follow Up     Return in about 3 months (around 3/8/2023) for Medication Management, DMNT.    Patient was given instructions and counseling regarding his condition or for health maintenance advice. Please see specific information pulled into the AVS if appropriate.     Suzanna Agosto, APRN  12/08/2022      Dictated Utilizing Dragon Dictation.  Please note that portions of this note were completed with a voice recognition program.  Part of this note may be an electronic transcription/translation of spoken language to printed text using the Dragon Dictation System.

## 2022-12-08 NOTE — PATIENT INSTRUCTIONS
Stop the Januvia; stop the metformin    Continue taking the glimepiride 4 mg take 1 tablet before breakfast and 1 tablet before supper    Begin taking Trulicity 0.75 mg once weekly on the same day of each week.  You will take a total of 4 doses of this strength and then we will increase you to 1.5 mg once weekly.    Begin taking Toujeo insulin 20 units once a day at around the same time every day.  This insulin will eventually be replaced with Basaglar insulin once your application is processed.  When you get the Basaglar you will take the same amount.

## 2022-12-09 ENCOUNTER — HOSPITAL ENCOUNTER (OUTPATIENT)
Dept: CT IMAGING | Facility: HOSPITAL | Age: 67
Discharge: HOME | End: 2022-12-09
Admitting: NURSE PRACTITIONER

## 2022-12-09 DIAGNOSIS — M47.816 LUMBAR SPONDYLOSIS: ICD-10-CM

## 2022-12-09 PROCEDURE — 72131 CT LUMBAR SPINE W/O DYE: CPT

## 2022-12-12 ENCOUNTER — TELEPHONE (OUTPATIENT)
Dept: UROLOGY | Facility: CLINIC | Age: 67
End: 2022-12-12

## 2022-12-12 ENCOUNTER — OFFICE VISIT (OUTPATIENT)
Dept: UROLOGY | Facility: CLINIC | Age: 67
End: 2022-12-12

## 2022-12-12 ENCOUNTER — PREP FOR SURGERY (OUTPATIENT)
Dept: OTHER | Facility: HOSPITAL | Age: 67
End: 2022-12-12

## 2022-12-12 VITALS — BODY MASS INDEX: 32.47 KG/M2 | WEIGHT: 253 LBS | HEIGHT: 74 IN | HEART RATE: 81 BPM

## 2022-12-12 DIAGNOSIS — N40.1 BENIGN PROSTATIC HYPERPLASIA WITH NOCTURIA: ICD-10-CM

## 2022-12-12 DIAGNOSIS — R35.1 BENIGN PROSTATIC HYPERPLASIA WITH NOCTURIA: ICD-10-CM

## 2022-12-12 DIAGNOSIS — N20.0 KIDNEY STONE: Primary | ICD-10-CM

## 2022-12-12 PROCEDURE — 99213 OFFICE O/P EST LOW 20 MIN: CPT | Performed by: NURSE PRACTITIONER

## 2022-12-12 RX ORDER — SODIUM CHLORIDE 0.9 % (FLUSH) 0.9 %
10 SYRINGE (ML) INJECTION AS NEEDED
Status: CANCELLED | OUTPATIENT
Start: 2022-12-12

## 2022-12-12 RX ORDER — SODIUM CHLORIDE 9 MG/ML
40 INJECTION, SOLUTION INTRAVENOUS AS NEEDED
Status: CANCELLED | OUTPATIENT
Start: 2022-12-12

## 2022-12-12 RX ORDER — CEFAZOLIN SODIUM 2 G/100ML
2 INJECTION, SOLUTION INTRAVENOUS ONCE
Status: CANCELLED | OUTPATIENT
Start: 2023-01-19

## 2022-12-12 RX ORDER — SODIUM CHLORIDE 0.9 % (FLUSH) 0.9 %
10 SYRINGE (ML) INJECTION EVERY 12 HOURS SCHEDULED
Status: CANCELLED | OUTPATIENT
Start: 2022-12-12

## 2022-12-12 RX ORDER — SODIUM CHLORIDE, SODIUM LACTATE, POTASSIUM CHLORIDE, CALCIUM CHLORIDE 600; 310; 30; 20 MG/100ML; MG/100ML; MG/100ML; MG/100ML
100 INJECTION, SOLUTION INTRAVENOUS CONTINUOUS
Status: CANCELLED | OUTPATIENT
Start: 2023-01-19

## 2022-12-12 NOTE — TELEPHONE ENCOUNTER
[facility] [place] [facility]  [address]  [place] [address]  Fax [phone]  Phone [phone]       12/12/22        To Whom It May Concern:        Our records indicate this patient is currently taking Aspirin 325  Chaparro Weber 1955 is to be cleared to hold his Aspirin  for 5 days prior to his Ureteroscopy laser lithotripsy with stent insertion on 1/19/23 with . You may contact our office at [phone] with any questions. I appreciate your prompt response in this matter. Please return this form to our office as soon as possible to [phone]. Thank you for your time and assistance.     [] I approve my patient, Chaparro Weber , to stop taking Aspirin 5 days prior to procedure  [] I do NOT approve my patient, Chaparro Weber , to stop taking Aspirin at this time.       Thank you,      Approving physician name (please print): __________________________________    Approving Physician signature: _________________________________     Date: _____________________      Sincerely,     Daniela BARBOUR

## 2022-12-13 NOTE — PROGRESS NOTES
Chief Complaint: Follow-up    Subjective      BPH       History of Present Illness  Chaparro Weber is a 67 y.o. male presents to [facility] for BPH.    Patient presents today on referral from Dr. Charlotte Kulkarni for BPH.    Patient admits to urinary frequency and urgency.  Denies any dysuria.    Admits to a slow urinary stream associated with spitting.  Patient reports that he sits down to void now, straining to empty.    Admits to nocturia 1-2X/night.    Denies any penis or scrotum pain.  Admits to ED issues with initiating and maintaining.    Patient reports that his testosterone was low and was recommended he start testosterone.  Patient refused, reports he did not like the way it made him feel.    Denies taking any BPH medications.    Denies taking any blood thinners.    Denies any family history of prostate cancer.    Previous psa's:  7/22: 0.014   7/21: 0.014  9/20: 0.01    *Update* 12/12/22:    Patient presents today for follow-up visit after starting Flomax.  Patient reports that his urine stream has improved.  He reports that he had an increase in right-sided flank pain.  Reports he had a CT's scan of his spine and was told he had kidney stones.  He is inquiring today about getting these removed.    Patient does report he is with urinary frequency and urgency.  Denies any dysuria.    Patient does report he is with nocturia about 5X/night.  I have discussed with him about his glucose levels being elevated.    Results for orders placed or performed in visit on 12/08/22   POC Glucose    Specimen: Blood   Result Value Ref Range    Glucose 342 (H) 70 - 99 mg/dL   POC Glycosylated Hemoglobin (Hb A1C)    Specimen: Blood   Result Value Ref Range    Hemoglobin A1C 11.8 %    Lot Number [id number]     Expiration Date 10.31.2024      Patient does wish to proceed to go ahead and get the right-sided kidney stone taking care of due to pain.     Patient does report that he takes aspirin 325 mg daily.   Get clearance from Dr. Kulkarni.    Study Result    Narrative & Impression   PROCEDURE:  CT LUMBAR SPINE WO CONTRAST     COMPARISONS:           11/22/2022; 4/13/2017.     INDICATIONS:  lumbar spondylosis     PROTOCOL:     Standard CT imaging protocol performed.                 RADIATION:      Total DLP: 1,011 mGy*cm.               Automated exposure control was utilized to minimize radiation dose.      TECHNIQUE:    After obtaining the patient's consent, multi-planar CT images (664 CT images) were   created without intravenous or intrathecal contrast agent administration.       FINDINGS:          The patient has undergone right-sided laminotomy and right-sided foraminectomy.  There has been   posterolateral fusion at the L5-S1 level with bilateral pedicular screws and juan construct.  There   may have been discectomy at L5-S1 with interbody prosthesis placement.  The imaged hardware is   thought to be intact and is stable in its alignment since prior exams.  No definite radiographic   evidence of hardware loosening or failure.  Facet osteoarthritis is seen at several levels,   especially on the left at L4-5 and on the right at L3-4.  Disc and endplate degenerative changes   are seen, especially at L2-3 and L4-5.  There may be mild chronic retrolisthesis at L1-2, L2-3, and   L5-S1, estimated at 6 mm or slightly less at each level.  Endplate osteophytes and/or   syndesmophytes are seen at several levels, especially at L1-2, L2-3, L4-5, and L5-S1.  There are   suspected pneumatocysts, especially on the left at L4-5, possibly associated with a left   subarticular superior disc extrusion, measuring about 0.9 x 1 x 1.6 cm in transverse, AP   (anteroposterior), and craniocaudal extent, respectively, as seen on image 98 of series 4 and image   36 of series 8 and adjacent images, as well as related to the right-sided facet joints at L3-4, as   seen on image 79 of series 4 and image 39 of series 8, measuring about 5 mm in  greatest axial   diameter.  It may be associated with a synovial cyst.  Moderate-to-severe spinal stenosis is seen   at L3-4 and L4-5, due to diffuse annular disc bulge, facet osteoarthritis, and posterior   ligamentous thickening.  Moderate spinal stenosis is seen at L2-3 due to retrolisthesis, diffuse   annular disc bulge, facet osteoarthritis, and to a lesser extent posterior ligamentous thickening.    There is mild-to-moderate spinal canal narrowing at L1-2, due to diffuse annular disc bulge and   posterior ligamentous thickening as well as facet osteoarthritis.  No significant spinal canal   narrowing is seen at L5-S1.  There may be foraminal narrowing, right greater than left, at L1-2,   bilaterally at L2-3, left greater than right at L3-4, and bilaterally at L4-5.  There are   incidental bilateral nonobstructing renal calyceal stones, which may measure as large as 6 mm.    There is slight motion artifact on the study.  No definite acute fracture is seen.  No acute   malalignment is suspected.  There is diffuse colonic diverticulosis.  There may be incidental   hepatomegaly.  There is mild lateral curvature of the lumbar spine with the convexity to the left,   which may be fixed or positional.         IMPRESSION:                    1. Postoperative changes are seen at the L5-S1 level, as detailed above.       2. Interval progression of the disc and endplate degenerative changes since the 4/13/2027 CT study,   especially at L2-3, L3-4, and L4-5, as detailed above.     3. No definite acute fracture or acute malalignment.       4. Please see above comments for further detail.             Please note that portions of this note were completed with a voice recognition program.     WM BULE JR, MD                    Objective     Past Medical History:   Diagnosis Date   • Anxiety    • Arthritis    • Asthma    • Chest pain    • CHF (congestive heart failure) (HCC)    • Condition not found     Hernia-Unspecified    • Deafness    • Diabetes (HCC)    • HBP (high blood pressure)    • Heart disease    • High cholesterol    • Kidney stones    • Lung disease    • Night sweats    • Ringing in ear    • Sleep apnea    • Type 2 diabetes mellitus with autonomic neuropathy (HCC) 07/16/2021       Past Surgical History:   Procedure Laterality Date   • APPENDECTOMY     • BACK SURGERY  2000,2010    lumbar with hardware   • CARDIAC DEFIBRILLATOR PLACEMENT     • CHOLECYSTECTOMY     • COLONOSCOPY N/A 06/22/2022    Procedure: COLONOSCOPY;  Surgeon: Nicholas Townsend MD;  Location: [facility];  Service: General;  Laterality: N/A;  DIVERTICULOSIS   • KIDNEY STONE SURGERY     • NECK SURGERY  2005    removal of bone spurs   • PACEMAKER IMPLANTATION         Outpatient Medications Marked as Taking for the 12/12/22 encounter (Office Visit) with Satnam April, APRN   Medication Sig Dispense Refill   • aspirin  MG tablet Take 325 mg by mouth Daily.     • celecoxib (CeleBREX) 100 MG capsule      • cholecalciferol (VITAMIN D3) 25 MCG (1000 UT) tablet Take 2,000 Units by mouth Daily.     • Diclofenac Sodium (VOLTAREN) 1 % gel gel      • Dulaglutide (Trulicity) 0.75 MG/0.5ML solution pen-injector Inject 0.75 mg under the skin into the appropriate area as directed Every 7 (Seven) Days for 4 doses. 2 mL 0   • escitalopram (LEXAPRO) 20 MG tablet TAKE ONE TABLET BY MOUTH ONCE DAILY 90 tablet 1   • fenofibrate (TRICOR) 145 MG tablet TAKE ONE TABLET BY MOUTH ONCE DAILY 90 tablet 1   • glimepiride (Amaryl) 4 MG tablet Take 1 tablet for one week. Then if blood sugar is above 160 in am fasting increase to 2 tablet daily. 60 tablet 5   • glucose blood test strip Use as instructed 100 each 2   • glucose blood test strip OneTouch Ultra Test strips     • HYDROcodone-acetaminophen (NORCO) 7.5-325 MG per tablet hydrocodone 7.5 mg-acetaminophen 325 mg tablet     • ibuprofen (ADVIL,MOTRIN) 200 MG tablet Take 200 mg by mouth Every 6 (Six) Hours As Needed for Mild  Pain .     • Insulin Glargine, 2 Unit Dial, (Toujeo Max SoloStar) 300 UNIT/ML solution pen-injector injection Inject 20 Units under the skin into the appropriate area as directed Every Night. 3 mL 0   • Insulin Pen Needle (Pen Needles) 32G X 5 MM misc 1 each Every Night. 100 each 1   • Inulin (FIBER CHOICE PO) Take  by mouth.     • metFORMIN ER (GLUCOPHAGE-XR) 500 MG 24 hr tablet Take 500 mg by mouth Daily.     • metFORMIN ER (GLUCOPHAGE-XR) 500 MG 24 hr tablet Take 500 mg by mouth Daily With Breakfast.     • methylcellulose, Laxative, (CITRUCEL) 500 MG tablet tablet Citrucel 500 mg oral tablet take 1 tablet by oral route daily   Active     • metoprolol succinate XL (TOPROL-XL) 25 MG 24 hr tablet Take 1 tablet by mouth Daily.     • pregabalin (LYRICA) 300 MG capsule      • psyllium (Metamucil) 0.52 g capsule      • valsartan (DIOVAN) 160 MG tablet TAKE ONE TABLET BY MOUTH ONCE DAILY FOR BLOOD PRESSURE 90 tablet 0   • vitamin B-12 (CYANOCOBALAMIN) 1000 MCG tablet Take 1,200 mcg by mouth Daily.     • vitamin E 100 UNIT capsule Take 100 Units by mouth Daily.     • Zinc 50 MG capsule Take  by mouth.         Allergies   Allergen Reactions   • Codeine Nausea Only and Nausea And Vomiting        Family History   Problem Relation Age of Onset   • Other Mother 60        Renal Cancer   • Prostate cancer Father    • Colon cancer Father 70   • Malig Hyperthermia Neg Hx        Social History     Socioeconomic History   • Marital status:    • Number of children: 2   Tobacco Use   • Smoking status: Former     Packs/day: 1.50     Years: 40.00     Pack years: 60.00     Types: Cigarettes     Start date: 1965     Quit date:      Years since quittin.9   • Smokeless tobacco: Never   • Tobacco comments:     no second hand smoke exposure   Vaping Use   • Vaping Use: Never used   Substance and Sexual Activity   • Alcohol use: Never   • Drug use: Never   • Sexual activity: Defer       Review of Systems   Constitutional:  "Negative for chills and fever.   Genitourinary: Positive for decreased libido, flank pain, nocturia and erectile dysfunction. Negative for decreased urine volume, difficulty urinating, discharge, dysuria, frequency, genital sores, hematuria, penile pain, penile swelling, scrotal swelling, testicular pain, urgency and urinary incontinence.        Vital Signs:   Pulse 81   Ht 188 cm (74\")   Wt 115 kg (253 lb)   BMI 32.48 kg/m²      Physical Exam  Vitals and nursing note reviewed.   Constitutional:       General: He is not in acute distress.     Appearance: Normal appearance.   HENT:      Head: Normocephalic.   Cardiovascular:      Rate and Rhythm: Normal rate.   Pulmonary:      Effort: Pulmonary effort is normal.      Breath sounds: No stridor. No wheezing.   Abdominal:      Palpations: Abdomen is soft.      Tenderness: There is right CVA tenderness. There is no guarding.   Genitourinary:     Comments:         Musculoskeletal:         General: No deformity. Normal range of motion.   Skin:     General: Skin is warm and dry.      Coloration: Skin is not jaundiced.   Neurological:      General: No focal deficit present.      Mental Status: He is alert and oriented to person, place, and time.   Psychiatric:         Mood and Affect: Mood normal.         Thought Content: Thought content normal.          Result Review :   []  Laboratory  [x]  Radiology  []  Pathology  []  Microbiology  []  EKG/Telemetry   []  Cardiology/Vascular   []  Old records  Today I reviewed previous CTs scan of the spine.      Assessment and Plan    Diagnoses and all orders for this visit:    1. Kidney stone (Primary)    2. Benign prostatic hyperplasia with nocturia      I have instructed the patient to hold his  mg for 5 days prior to the procedure.  Follow Up   Return for Schedule ureteroscopy with laser and possible stent placement on 1/19/2023 with Dr. Mon.     BPH with Luts- behavioral modifications including fluid management, " limiting fluids prior to sleep, and voiding immediately prior to sleep.      Consider workup for RYLEE per PCP as studies have shown that with treatment of sleep apnea, nocuria can be significantly reduced.      Continue conservative management of BPH Luts via behavioral modifications and medications; monitor for adverse outcomes of BPH which would warrant discussion of further management.     Start flomax 0.4mg/daily; side effects discussed; aware that it may take 3-4 months to have complete effect so encouraged patient to continue to take to ensure adequate trial of medication.    Patient was given instructions and counseling regarding his condition or for health maintenance advice. Please see specific information pulled into the AVS if appropriate.     The office note was dictated with the help of the dragon dictation system.

## 2022-12-14 NOTE — TELEPHONE ENCOUNTER
Received clearance from  to stop aspirin 5 days prior to 1/19/23.  Called and spoke to pt, pt verbalized understanding.

## 2022-12-20 ENCOUNTER — TELEPHONE (OUTPATIENT)
Dept: UROLOGY | Facility: CLINIC | Age: 67
End: 2022-12-20

## 2022-12-20 NOTE — TELEPHONE ENCOUNTER
Left message for PT to call back and reschedule his procedure from 1/19/23 to another date as Dr. Mon will not be here on that day.   negative...

## 2022-12-20 NOTE — TELEPHONE ENCOUNTER
PT returned call and we rescheduled hiim for 1/5/23. I confirmed with him to stop his aspirin for 5 days prior to the procedure as previously determined by Dr. Kulkarni. He voiced understanding.

## 2022-12-23 RX ORDER — VALSARTAN 160 MG/1
TABLET ORAL
Qty: 90 TABLET | Refills: 0 | Status: SHIPPED | OUTPATIENT
Start: 2022-12-23 | End: 2023-03-28

## 2022-12-29 ENCOUNTER — TELEPHONE (OUTPATIENT)
Dept: DIABETES SERVICES | Facility: HOSPITAL | Age: 67
End: 2022-12-29
Payer: MEDICARE

## 2022-12-29 RX ORDER — GLIMEPIRIDE 4 MG/1
4 TABLET ORAL
Qty: 60 TABLET | Refills: 5 | Status: SHIPPED | OUTPATIENT
Start: 2022-12-29

## 2022-12-29 NOTE — TELEPHONE ENCOUNTER
Patient came in concerned over injections that he is taking. His sugar is usually between 300-400 and this morning it was over 500. He would like a phone call regarding the medication believes it is the Trulicity. [phone].    Please Advise.

## 2022-12-29 NOTE — TELEPHONE ENCOUNTER
The patient reports his glucose levels have been markedly elevated.  In discussion with the patient was discovered that he inadvertently stopped taking his glimepiride after the last appointment.  He was instructed to remain on the glimepiride but to discontinue the Januvia and the metformin at the last appointment.  Patient was advised to increase the Toujeo insulin to 25 units with his next dose and resume the glimepiride 4 mg twice a day.  If his glucose levels remain elevated above 200 after 2 to 3 days on the Toujeo at 25 units he was advised to increase it further to 30 units and call the office for discussion.

## 2022-12-29 NOTE — PRE-PROCEDURE INSTRUCTIONS
PATIENT INSTRUCTED TO BE:    - NPO AFTER MIDNIGHT EXCEPT CAN HAVE CLEAR LIQUIDS 2 HOURS PRIOR TO SURGERY ARRIVAL TIME     - TO HOLD ALL VITAMINS, SUPPLEMENTS, NSAIDS FOR ONE WEEK PRIOR TO THEIR SURGICAL PROCEDURE    - INSTRUCTED NO LOTIONS, JEWELRY, PIERCINGS, OR DEODORANT DAY OF SURGERY    - IF DIABETIC, CHECK BLOOD GLUCOSE IF LESS THAN 70 CALL PREOP AREA -AM OF SURGERY     - INSTRUCTED TO TAKE THE FOLLOWING MEDICATIONS THE DAY OF SURGERY:       LYRICA, TOPROL, NORCO PRN, TRICOR, LEXAPRO    PATIENT VERBALIZED UNDERSTANDING   BUT STATED HE MAY NOT TAKE ANY MEDS DAY OF SURGERY, ENCOURAGED PT TO DEFINITELY TAKE METOPROLOL DAY OF SURGERY. PT VERBALIZED UNDERSTANDING HE WOULD TAKE METOPROLOL DAY OF SURGERY

## 2023-01-03 ENCOUNTER — TELEPHONE (OUTPATIENT)
Dept: DIABETES SERVICES | Facility: HOSPITAL | Age: 68
End: 2023-01-03
Payer: MEDICARE

## 2023-01-03 ENCOUNTER — TELEPHONE (OUTPATIENT)
Dept: FAMILY MEDICINE CLINIC | Facility: CLINIC | Age: 68
End: 2023-01-03
Payer: MEDICARE

## 2023-01-03 DIAGNOSIS — M54.9 BACK PAIN, UNSPECIFIED BACK LOCATION, UNSPECIFIED BACK PAIN LATERALITY, UNSPECIFIED CHRONICITY: Primary | ICD-10-CM

## 2023-01-03 NOTE — TELEPHONE ENCOUNTER
Patient stopped by office. Is concerned as he cannot keep his BS below 300 and is scheduled for surgery this Thursday 01/05/23. Please call and advise him how he can manage his blood sugar levels.

## 2023-01-03 NOTE — TELEPHONE ENCOUNTER
Called for ref on back issue. Said if one can get sent over in the next couple of days the specialist can get him in next week. Dr Schaefer, [facility].

## 2023-01-05 ENCOUNTER — APPOINTMENT (OUTPATIENT)
Dept: GENERAL RADIOLOGY | Facility: HOSPITAL | Age: 68
End: 2023-01-05
Payer: MEDICARE

## 2023-01-05 ENCOUNTER — ANESTHESIA EVENT (OUTPATIENT)
Dept: PERIOP | Facility: HOSPITAL | Age: 68
End: 2023-01-05
Payer: MEDICARE

## 2023-01-05 ENCOUNTER — HOSPITAL ENCOUNTER (OUTPATIENT)
Facility: HOSPITAL | Age: 68
Setting detail: HOSPITAL OUTPATIENT SURGERY
Discharge: HOME | End: 2023-01-05
Attending: UROLOGY | Admitting: UROLOGY
Payer: MEDICARE

## 2023-01-05 ENCOUNTER — ANESTHESIA (OUTPATIENT)
Dept: PERIOP | Facility: HOSPITAL | Age: 68
End: 2023-01-05
Payer: MEDICARE

## 2023-01-05 VITALS
TEMPERATURE: 97.8 F | HEIGHT: 74 IN | WEIGHT: 248.9 LBS | RESPIRATION RATE: 18 BRPM | HEART RATE: 94 BPM | BODY MASS INDEX: 31.94 KG/M2 | DIASTOLIC BLOOD PRESSURE: 62 MMHG | OXYGEN SATURATION: 96 % | SYSTOLIC BLOOD PRESSURE: 106 MMHG

## 2023-01-05 DIAGNOSIS — N20.0 KIDNEY STONE: ICD-10-CM

## 2023-01-05 LAB
ANION GAP SERPL CALCULATED.3IONS-SCNC: 7.9 MMOL/L (ref 5–15)
BUN SERPL-MCNC: 25 MG/DL (ref 8–23)
BUN/CREAT SERPL: 26 (ref 7–25)
CALCIUM SPEC-SCNC: 9.2 MG/DL (ref 8.6–10.5)
CHLORIDE SERPL-SCNC: 99 MMOL/L (ref 98–107)
CO2 SERPL-SCNC: 29.1 MMOL/L (ref 22–29)
CREAT SERPL-MCNC: 0.96 MG/DL (ref 0.76–1.27)
EGFRCR SERPLBLD CKD-EPI 2021: 87.7 ML/MIN/1.73
GLUCOSE BLDC GLUCOMTR-MCNC: 241 MG/DL (ref 70–99)
GLUCOSE BLDC GLUCOMTR-MCNC: 258 MG/DL (ref 70–99)
GLUCOSE SERPL-MCNC: 285 MG/DL (ref 65–99)
LAB AP CASE REPORT: NORMAL
LAB AP CLINICAL INFORMATION: NORMAL
PATH REPORT.FINAL DX SPEC: NORMAL
PATH REPORT.GROSS SPEC: NORMAL
POTASSIUM SERPL-SCNC: 4.7 MMOL/L (ref 3.5–5.2)
QT INTERVAL: 417 MS
SODIUM SERPL-SCNC: 136 MMOL/L (ref 136–145)

## 2023-01-05 PROCEDURE — C1894 INTRO/SHEATH, NON-LASER: HCPCS | Performed by: UROLOGY

## 2023-01-05 PROCEDURE — 82365 CALCULUS SPECTROSCOPY: CPT | Performed by: UROLOGY

## 2023-01-05 PROCEDURE — 25010000002 FENTANYL CITRATE (PF) 50 MCG/ML SOLUTION: Performed by: NURSE ANESTHETIST, CERTIFIED REGISTERED

## 2023-01-05 PROCEDURE — C1769 GUIDE WIRE: HCPCS | Performed by: UROLOGY

## 2023-01-05 PROCEDURE — 76000 FLUOROSCOPY <1 HR PHYS/QHP: CPT

## 2023-01-05 PROCEDURE — 52352 CYSTOURETERO W/STONE REMOVE: CPT | Performed by: UROLOGY

## 2023-01-05 PROCEDURE — 80048 BASIC METABOLIC PNL TOTAL CA: CPT | Performed by: ANESTHESIOLOGY

## 2023-01-05 PROCEDURE — 25010000002 ONDANSETRON PER 1 MG: Performed by: NURSE ANESTHETIST, CERTIFIED REGISTERED

## 2023-01-05 PROCEDURE — 93010 ELECTROCARDIOGRAM REPORT: CPT | Performed by: INTERNAL MEDICINE

## 2023-01-05 PROCEDURE — 25010000002 METOCLOPRAMIDE PER 10 MG: Performed by: ANESTHESIOLOGY

## 2023-01-05 PROCEDURE — 88300 SURGICAL PATH GROSS: CPT | Performed by: UROLOGY

## 2023-01-05 PROCEDURE — 52332 CYSTOSCOPY AND TREATMENT: CPT | Performed by: UROLOGY

## 2023-01-05 PROCEDURE — 74018 RADEX ABDOMEN 1 VIEW: CPT

## 2023-01-05 PROCEDURE — 82962 GLUCOSE BLOOD TEST: CPT

## 2023-01-05 PROCEDURE — 25010000002 CEFAZOLIN IN DEXTROSE 2-4 GM/100ML-% SOLUTION: Performed by: NURSE PRACTITIONER

## 2023-01-05 PROCEDURE — 93005 ELECTROCARDIOGRAM TRACING: CPT | Performed by: ANESTHESIOLOGY

## 2023-01-05 PROCEDURE — 25010000002 PROPOFOL 10 MG/ML EMULSION: Performed by: NURSE ANESTHETIST, CERTIFIED REGISTERED

## 2023-01-05 PROCEDURE — 25010000002 DEXAMETHASONE PER 1 MG: Performed by: NURSE ANESTHETIST, CERTIFIED REGISTERED

## 2023-01-05 PROCEDURE — C2617 STENT, NON-COR, TEM W/O DEL: HCPCS | Performed by: UROLOGY

## 2023-01-05 PROCEDURE — 25010000002 MIDAZOLAM PER 1 MG: Performed by: ANESTHESIOLOGY

## 2023-01-05 PROCEDURE — 52356 CYSTO/URETERO W/LITHOTRIPSY: CPT | Performed by: UROLOGY

## 2023-01-05 DEVICE — IMPLANTABLE DEVICE: Type: IMPLANTABLE DEVICE | Site: URETER | Status: FUNCTIONAL

## 2023-01-05 RX ORDER — SODIUM CHLORIDE, SODIUM LACTATE, POTASSIUM CHLORIDE, CALCIUM CHLORIDE 600; 310; 30; 20 MG/100ML; MG/100ML; MG/100ML; MG/100ML
9 INJECTION, SOLUTION INTRAVENOUS CONTINUOUS PRN
Status: DISCONTINUED | OUTPATIENT
Start: 2023-01-05 | End: 2023-01-05 | Stop reason: HOSPADM

## 2023-01-05 RX ORDER — DEXMEDETOMIDINE HYDROCHLORIDE 100 UG/ML
INJECTION, SOLUTION INTRAVENOUS AS NEEDED
Status: DISCONTINUED | OUTPATIENT
Start: 2023-01-05 | End: 2023-01-05 | Stop reason: SURG

## 2023-01-05 RX ORDER — SODIUM CHLORIDE 9 MG/ML
40 INJECTION, SOLUTION INTRAVENOUS AS NEEDED
Status: DISCONTINUED | OUTPATIENT
Start: 2023-01-05 | End: 2023-01-05 | Stop reason: HOSPADM

## 2023-01-05 RX ORDER — EPHEDRINE SULFATE 50 MG/ML
INJECTION, SOLUTION INTRAVENOUS AS NEEDED
Status: DISCONTINUED | OUTPATIENT
Start: 2023-01-05 | End: 2023-01-05 | Stop reason: SURG

## 2023-01-05 RX ORDER — OXYCODONE HYDROCHLORIDE 5 MG/1
5 TABLET ORAL
Status: DISCONTINUED | OUTPATIENT
Start: 2023-01-05 | End: 2023-01-05 | Stop reason: HOSPADM

## 2023-01-05 RX ORDER — MAGNESIUM HYDROXIDE 1200 MG/15ML
LIQUID ORAL AS NEEDED
Status: DISCONTINUED | OUTPATIENT
Start: 2023-01-05 | End: 2023-01-05 | Stop reason: HOSPADM

## 2023-01-05 RX ORDER — PROPOFOL 10 MG/ML
VIAL (ML) INTRAVENOUS AS NEEDED
Status: DISCONTINUED | OUTPATIENT
Start: 2023-01-05 | End: 2023-01-05 | Stop reason: SURG

## 2023-01-05 RX ORDER — LIDOCAINE HYDROCHLORIDE 20 MG/ML
INJECTION, SOLUTION EPIDURAL; INFILTRATION; INTRACAUDAL; PERINEURAL AS NEEDED
Status: DISCONTINUED | OUTPATIENT
Start: 2023-01-05 | End: 2023-01-05 | Stop reason: SURG

## 2023-01-05 RX ORDER — PROMETHAZINE HYDROCHLORIDE 12.5 MG/1
25 TABLET ORAL ONCE AS NEEDED
Status: DISCONTINUED | OUTPATIENT
Start: 2023-01-05 | End: 2023-01-05 | Stop reason: HOSPADM

## 2023-01-05 RX ORDER — SUCCINYLCHOLINE/SOD CL,ISO/PF 100 MG/5ML
SYRINGE (ML) INTRAVENOUS AS NEEDED
Status: DISCONTINUED | OUTPATIENT
Start: 2023-01-05 | End: 2023-01-05 | Stop reason: SURG

## 2023-01-05 RX ORDER — MIDAZOLAM HYDROCHLORIDE 1 MG/ML
2 INJECTION INTRAMUSCULAR; INTRAVENOUS ONCE
Status: COMPLETED | OUTPATIENT
Start: 2023-01-05 | End: 2023-01-05

## 2023-01-05 RX ORDER — SODIUM CHLORIDE, SODIUM LACTATE, POTASSIUM CHLORIDE, CALCIUM CHLORIDE 600; 310; 30; 20 MG/100ML; MG/100ML; MG/100ML; MG/100ML
100 INJECTION, SOLUTION INTRAVENOUS CONTINUOUS
Status: DISCONTINUED | OUTPATIENT
Start: 2023-01-05 | End: 2023-01-05 | Stop reason: HOSPADM

## 2023-01-05 RX ORDER — GLYCOPYRROLATE 0.2 MG/ML
0.2 INJECTION INTRAMUSCULAR; INTRAVENOUS
Status: COMPLETED | OUTPATIENT
Start: 2023-01-05 | End: 2023-01-05

## 2023-01-05 RX ORDER — ONDANSETRON 2 MG/ML
INJECTION INTRAMUSCULAR; INTRAVENOUS AS NEEDED
Status: DISCONTINUED | OUTPATIENT
Start: 2023-01-05 | End: 2023-01-05 | Stop reason: SURG

## 2023-01-05 RX ORDER — ROCURONIUM BROMIDE 10 MG/ML
INJECTION, SOLUTION INTRAVENOUS AS NEEDED
Status: DISCONTINUED | OUTPATIENT
Start: 2023-01-05 | End: 2023-01-05 | Stop reason: SURG

## 2023-01-05 RX ORDER — ONDANSETRON 2 MG/ML
4 INJECTION INTRAMUSCULAR; INTRAVENOUS ONCE AS NEEDED
Status: DISCONTINUED | OUTPATIENT
Start: 2023-01-05 | End: 2023-01-05 | Stop reason: HOSPADM

## 2023-01-05 RX ORDER — IBUPROFEN 600 MG/1
600 TABLET ORAL EVERY 6 HOURS PRN
Status: DISCONTINUED | OUTPATIENT
Start: 2023-01-05 | End: 2023-01-05 | Stop reason: HOSPADM

## 2023-01-05 RX ORDER — PROMETHAZINE HYDROCHLORIDE 12.5 MG/1
12.5 TABLET ORAL ONCE AS NEEDED
Status: DISCONTINUED | OUTPATIENT
Start: 2023-01-05 | End: 2023-01-05 | Stop reason: HOSPADM

## 2023-01-05 RX ORDER — ACETAMINOPHEN 500 MG
1000 TABLET ORAL ONCE
Status: COMPLETED | OUTPATIENT
Start: 2023-01-05 | End: 2023-01-05

## 2023-01-05 RX ORDER — MEPERIDINE HYDROCHLORIDE 25 MG/ML
12.5 INJECTION INTRAMUSCULAR; INTRAVENOUS; SUBCUTANEOUS
Status: DISCONTINUED | OUTPATIENT
Start: 2023-01-05 | End: 2023-01-05 | Stop reason: HOSPADM

## 2023-01-05 RX ORDER — SODIUM CHLORIDE 0.9 % (FLUSH) 0.9 %
10 SYRINGE (ML) INJECTION EVERY 12 HOURS SCHEDULED
Status: DISCONTINUED | OUTPATIENT
Start: 2023-01-05 | End: 2023-01-05 | Stop reason: HOSPADM

## 2023-01-05 RX ORDER — DEXAMETHASONE SODIUM PHOSPHATE 4 MG/ML
INJECTION, SOLUTION INTRA-ARTICULAR; INTRALESIONAL; INTRAMUSCULAR; INTRAVENOUS; SOFT TISSUE AS NEEDED
Status: DISCONTINUED | OUTPATIENT
Start: 2023-01-05 | End: 2023-01-05 | Stop reason: SURG

## 2023-01-05 RX ORDER — PHENYLEPHRINE HCL IN 0.9% NACL 1 MG/10 ML
SYRINGE (ML) INTRAVENOUS AS NEEDED
Status: DISCONTINUED | OUTPATIENT
Start: 2023-01-05 | End: 2023-01-05 | Stop reason: SURG

## 2023-01-05 RX ORDER — CEFAZOLIN SODIUM 2 G/100ML
2 INJECTION, SOLUTION INTRAVENOUS ONCE
Status: COMPLETED | OUTPATIENT
Start: 2023-01-05 | End: 2023-01-05

## 2023-01-05 RX ORDER — ACETAMINOPHEN 325 MG/1
650 TABLET ORAL ONCE
Status: DISCONTINUED | OUTPATIENT
Start: 2023-01-05 | End: 2023-01-05 | Stop reason: HOSPADM

## 2023-01-05 RX ORDER — METOCLOPRAMIDE HYDROCHLORIDE 5 MG/ML
10 INJECTION INTRAMUSCULAR; INTRAVENOUS ONCE AS NEEDED
Status: COMPLETED | OUTPATIENT
Start: 2023-01-05 | End: 2023-01-05

## 2023-01-05 RX ORDER — PROMETHAZINE HYDROCHLORIDE 25 MG/1
25 SUPPOSITORY RECTAL ONCE AS NEEDED
Status: DISCONTINUED | OUTPATIENT
Start: 2023-01-05 | End: 2023-01-05 | Stop reason: HOSPADM

## 2023-01-05 RX ORDER — OXYCODONE HYDROCHLORIDE AND ACETAMINOPHEN 5; 325 MG/1; MG/1
1-2 TABLET ORAL EVERY 4 HOURS PRN
Qty: 20 TABLET | Refills: 0 | Status: SHIPPED | OUTPATIENT
Start: 2023-01-05

## 2023-01-05 RX ORDER — SODIUM CHLORIDE 0.9 % (FLUSH) 0.9 %
10 SYRINGE (ML) INJECTION AS NEEDED
Status: DISCONTINUED | OUTPATIENT
Start: 2023-01-05 | End: 2023-01-05 | Stop reason: HOSPADM

## 2023-01-05 RX ORDER — FENTANYL CITRATE 50 UG/ML
INJECTION, SOLUTION INTRAMUSCULAR; INTRAVENOUS AS NEEDED
Status: DISCONTINUED | OUTPATIENT
Start: 2023-01-05 | End: 2023-01-05 | Stop reason: SURG

## 2023-01-05 RX ADMIN — METOCLOPRAMIDE HYDROCHLORIDE 10 MG: 5 INJECTION INTRAMUSCULAR; INTRAVENOUS at 10:07

## 2023-01-05 RX ADMIN — DEXAMETHASONE SODIUM PHOSPHATE 4 MG: 4 INJECTION, SOLUTION INTRA-ARTICULAR; INTRALESIONAL; INTRAMUSCULAR; INTRAVENOUS; SOFT TISSUE at 11:11

## 2023-01-05 RX ADMIN — ROCURONIUM BROMIDE 10 MG: 10 INJECTION, SOLUTION INTRAVENOUS at 10:43

## 2023-01-05 RX ADMIN — PROPOFOL 150 MG: 10 INJECTION, EMULSION INTRAVENOUS at 10:43

## 2023-01-05 RX ADMIN — FENTANYL CITRATE 100 MCG: 50 INJECTION, SOLUTION INTRAMUSCULAR; INTRAVENOUS at 10:43

## 2023-01-05 RX ADMIN — ONDANSETRON 4 MG: 2 INJECTION INTRAMUSCULAR; INTRAVENOUS at 11:11

## 2023-01-05 RX ADMIN — EPHEDRINE SULFATE 10 MG: 50 INJECTION INTRAVENOUS at 11:08

## 2023-01-05 RX ADMIN — MIDAZOLAM HYDROCHLORIDE 2 MG: 2 INJECTION, SOLUTION INTRAMUSCULAR; INTRAVENOUS at 10:07

## 2023-01-05 RX ADMIN — Medication 200 MCG: at 10:57

## 2023-01-05 RX ADMIN — CEFAZOLIN SODIUM 2 G: 2 INJECTION, SOLUTION INTRAVENOUS at 10:34

## 2023-01-05 RX ADMIN — Medication 200 MCG: at 10:55

## 2023-01-05 RX ADMIN — Medication 100 MG: at 10:43

## 2023-01-05 RX ADMIN — SODIUM CHLORIDE, POTASSIUM CHLORIDE, SODIUM LACTATE AND CALCIUM CHLORIDE 9 ML/HR: 600; 310; 30; 20 INJECTION, SOLUTION INTRAVENOUS at 08:01

## 2023-01-05 RX ADMIN — SUGAMMADEX 200 MG: 100 INJECTION, SOLUTION INTRAVENOUS at 11:50

## 2023-01-05 RX ADMIN — LIDOCAINE HYDROCHLORIDE 100 MG: 20 INJECTION, SOLUTION EPIDURAL; INFILTRATION; INTRACAUDAL; PERINEURAL at 10:43

## 2023-01-05 RX ADMIN — ROCURONIUM BROMIDE 20 MG: 10 INJECTION, SOLUTION INTRAVENOUS at 10:54

## 2023-01-05 RX ADMIN — GLYCOPYRROLATE 0.2 MG: 0.2 INJECTION INTRAMUSCULAR; INTRAVENOUS at 10:07

## 2023-01-05 RX ADMIN — Medication 200 MCG: at 11:02

## 2023-01-05 RX ADMIN — ACETAMINOPHEN 1000 MG: 500 TABLET ORAL at 08:01

## 2023-01-05 RX ADMIN — DEXMEDETOMIDINE HYDROCHLORIDE 10 MCG: 100 INJECTION, SOLUTION, CONCENTRATE INTRAVENOUS at 11:04

## 2023-01-05 RX ADMIN — EPHEDRINE SULFATE 10 MG: 50 INJECTION INTRAVENOUS at 11:04

## 2023-01-05 RX ADMIN — Medication 200 MCG: at 10:59

## 2023-01-05 NOTE — ANESTHESIA PREPROCEDURE EVALUATION
Anesthesia Evaluation     Patient summary reviewed and Nursing notes reviewed   no history of anesthetic complications:  NPO Solid Status: > 8 hours  NPO Liquid Status: > 2 hours           Airway   Mallampati: II  Dental      Pulmonary    (+) COPD, asthma,sleep apnea,   Cardiovascular   Exercise tolerance: good (4-7 METS)    (+) hypertension, CAD, dysrhythmias Atrial Fib, CHF , hyperlipidemia,       Neuro/Psych  (+) numbness, psychiatric history,    GI/Hepatic/Renal/Endo    (+)  GERD,  renal disease, diabetes mellitus type 2,     Musculoskeletal     Abdominal    Substance History - negative use     OB/GYN negative ob/gyn ROS         Other   arthritis,      ROS/Med Hx Other: >4METS, HX BRADYCARDIA, CAD, CHF, NICM, HTN, COPD, LBBB. PACEMAKER/DEFIB/LAST DEVICE CHECK 12/1/22. ECHO 1/19/22 EF 45-50%, NO VALVE STENOSIS. KT          ABNORMAL ECG -  Atrial-sensed ventricular-paced rhythm  No further analysis attempted due to paced rhythm     Latest Reference Range & Units 07/29/22 13:35   Glucose 65 - 99 mg/dL 361 (H)   Sodium 136 - 145 mmol/L 136   Potassium 3.5 - 5.2 mmol/L 4.5   CO2 22.0 - 29.0 mmol/L 26.0   Chloride 98 - 107 mmol/L 99   Anion Gap 5.0 - 15.0 mmol/L 11.0   Creatinine 0.76 - 1.27 mg/dL 1.10   BUN 8 - 23 mg/dL 23   BUN/Creatinine Ratio 7.0 - 25.0  20.9   Calcium 8.6 - 10.5 mg/dL 9.3   eGFR >60.0 mL/min/1.73 75.0   Alkaline Phosphatase 39 - 117 U/L 52   Total Protein 6.0 - 8.5 g/dL 6.8   ALT (SGPT) 1 - 41 U/L 56 (H)   AST (SGOT) 1 - 40 U/L 49 (H)   Total Bilirubin 0.0 - 1.2 mg/dL 0.2   Albumin 3.50 - 5.20 g/dL 4.40   Globulin gm/dL 2.4   A/G Ratio g/dL 1.8   (H): Data is abnormally high     Latest Reference Range & Units Most Recent   Hemoglobin 13.0 - 17.7 g/dL 13.5  7/29/22 13:35   Hematocrit 37.5 - 51.0 % 40.7  7/29/22 13:35              Anesthesia Plan    ASA 4     general     (Patient understands anesthesia not responsible for dental damage.    Pacemaker battery good)  intravenous induction      Anesthetic plan, risks, benefits, and alternatives have been provided, discussed and informed consent has been obtained with: patient.    Use of blood products discussed with patient .   Plan discussed with CRNA.        CODE STATUS:

## 2023-01-05 NOTE — H&P
[facility]   Urology HISTORY AND PHYSICAL    Patient Name: Chaparro Weber  : 1955  MRN: [id number]  Primary Care Physician:  Charlotte Kulkarni DO  Date of admission: 2023    Subjective   Subjective       History of Present Illness  Patient has bilateral renal stones and presents for right ureteroscopy, laser lithotripsy and right ureteral stent placement.       Personal History     Past Medical History:   Diagnosis Date   • Anxiety    • Arthritis    • Asthma     - PT DENIED ASTHMA- NO INHALERS   • Bradycardia     DEFBIRLLATOR/ PACEMAKER- MEDITRONIC- SEE'S DR. HAMEED DENIED CP/SOB   • Chest pain     NO RECENT CP   • CHF (congestive heart failure) (HCC)     ASYMPTOMATIC   • Condition not found     Hernia-Unspecified   • Coronary artery disease    • Deafness    • Diabetes (HCC)     BG RUNS AROUND 250-300 IN AM   • HBP (high blood pressure)    • Heart disease    • High cholesterol    • Kidney stones    • Lung disease    • Night sweats    • Ringing in ear    • Sleep apnea    • Type 2 diabetes mellitus with autonomic neuropathy (HCC) 2021       Past Surgical History:   Procedure Laterality Date   • APPENDECTOMY     • BACK SURGERY  ,    lumbar with hardware   • CARDIAC DEFIBRILLATOR PLACEMENT      MEDITRONIC   • CHOLECYSTECTOMY     • COLONOSCOPY N/A 2022    Procedure: COLONOSCOPY;  Surgeon: Nicholas Townsend MD;  Location: [facility] ENDOSCOPY;  Service: General;  Laterality: N/A;  DIVERTICULOSIS   • KIDNEY STONE SURGERY     • NECK SURGERY      removal of bone spurs   • PACEMAKER IMPLANTATION      10 YEARS AGO PLACED- MEDITRONIC       Family History: family history includes Colon cancer (age of onset: 70) in his father; Other (age of onset: 60) in his mother; Prostate cancer in his father. Otherwise pertinent FHx was reviewed and not pertinent to current issue.    Social History:  reports that he quit smoking about 47 years ago. His smoking use included cigarettes. He started smoking  about 58 years ago. He has a 60.00 pack-year smoking history. He has never used smokeless tobacco. He reports that he does not drink alcohol and does not use drugs.    Home Medications:  Diclofenac Sodium, HYDROcodone-acetaminophen, Insulin Glargine (2 Unit Dial), Pen Needles, Zinc, aspirin EC, celecoxib, cholecalciferol, escitalopram, fenofibrate, glimepiride, glucose blood, ibuprofen, methylcellulose (Laxative), metoprolol succinate XL, pregabalin, psyllium, valsartan, vitamin B-12, and vitamin E    Allergies:  Allergies   Allergen Reactions   • Codeine Nausea Only and Nausea And Vomiting       Objective    Objective     Vitals:   Temp:  [96.6 °F (35.9 °C)] 96.6 °F (35.9 °C)  Heart Rate:  [80] 80  Resp:  [18] 18  BP: (116)/(72) 116/72    Physical Exam  Constitutional:       Appearance: Normal appearance.   Cardiovascular:      Rate and Rhythm: Normal rate and regular rhythm.   Pulmonary:      Effort: Pulmonary effort is normal.      Breath sounds: Normal breath sounds.   Neurological:      Mental Status: He is alert. Mental status is at baseline.   Psychiatric:         Mood and Affect: Mood and affect normal.         Speech: Speech normal.         Judgment: Judgment normal.         Result Review    Result Review:  I have personally reviewed the results from the time of this admission to 1/5/2023 07:29 EST and agree with these findings:  [x]  Laboratory  []  Microbiology  [x]  Radiology  []  EKG/Telemetry   []  Cardiology/Vascular   []  Pathology  []  Old records  []  Other:      Assessment & Plan   Assessment / Plan       Active Hospital Problems:  Active Hospital Problems    Diagnosis    • **Kidney stones        Plan: Right ureteroscopy, laser lithotripsy and right ureteral stent placement.   Risks and benefits discussed with patient and they are agreeable to proceed.    DVT prophylaxis:  No DVT prophylaxis order currently exists.    CODE STATUS:           Electronically signed by Ann Mon MD, 01/05/23,  7:29 AM EST.

## 2023-01-05 NOTE — DISCHARGE INSTRUCTIONS
DISCHARGE INSTRUCTIONS  Extracorporeal Shock Wave Lithotripsy (ESWL)/ Ureteroscopy Lasertripsy      For your surgery you had:  General anesthesia (you may have a sore throat for the first 24 hours)    You may experience dizziness, drowsiness, or lightheadedness for several hours following surgery.  Do not stay alone today or tonight.  Limit your activity for 24 hours.  You should not drive or operate machinery, drink alcohol, or sign legally binding documents for 24 hours or while you are taking pain medication.  Resume your diet slowly.  Follow any special dietary instructions you may have been given by your doctor.    Last dose of pain medication was given at:      08:00 1000 mg Tylenol      NOTIFY YOUR DOCTOR IF YOU EXPERIENCE ANY OF THE FOLLOWING:  Temperature greater than 101 degrees Fahrenheit  Shaking Chills  Redness or excessive drainage from incision  Nausea, vomiting and/or pain that is not controlled by prescribed medications  Increase in bleeding or bleeding that is excessive  Unable to urinate in 6 hours after surgery  If unable to reach your doctor, please go to the closest Emergency Room  Strain urine if instructed by physician.  Collect any fragments and take with you on your scheduled appointment. You may pass stone pieces or small blood clots.  Blood in your urine is normal.  It could be light pink to cherry color.  Drink 6-8 glasses of fluid each day to assist with passing of stone fragments.  Back pain is common.  It may feel like a dull ache or back spasm.  Urine will be bloody for several days.  Slight redness or bruising may be noticed on treated side.  If you have difficulty urinating, try sitting in a bathtub of warm water.    If you have a stent, it must be managed by your urologist.  Do NOT forget.  Medications per physician instructions as indicated on Discharge Medication Information Sheet.  SPECIAL INSTRUCTIONS:  Remove stent at home in 5 days by gently pulling string.

## 2023-01-05 NOTE — ANESTHESIA POSTPROCEDURE EVALUATION
Patient: Chaparro Weber    Procedure Summary     Date: 01/05/23 Room / Location: [facility] OR 04 / [facility] MAIN OR    Anesthesia Start: 1033 Anesthesia Stop: 1212    Procedures:       LEFT URETEROSCOPY LASER LITHOTRIPSY WITH STENT INSERTION AND RETROGRADE; RIGHT URETEROSCOPY WITH STENT INSERTION AND RETROGRADE (Left: Ureter)      CYSTOSCOPY KIDNEY STONE MANIPULATION/EXTRACTION (Right) Diagnosis:       Kidney stone      (Kidney stone [N20.0])    Surgeons: Ann Mon MD Provider: Reyes, Mirabelle, DO    Anesthesia Type: general ASA Status: 4          Anesthesia Type: general    Vitals  Vitals Value Taken Time   /67 01/05/23 1236   Temp 36.1 °C (97 °F) 01/05/23 1210   Pulse 90 01/05/23 1239   Resp 16 01/05/23 1230   SpO2 92 % 01/05/23 1239   Vitals shown include unvalidated device data.        Post Anesthesia Care and Evaluation    Patient location during evaluation: bedside  Patient participation: complete - patient participated  Level of consciousness: awake  Pain management: adequate    Airway patency: patent  Anesthetic complications: No anesthetic complications  PONV Status: none  Cardiovascular status: acceptable and stable  Respiratory status: acceptable  Hydration status: acceptable    Comments: An Anesthesiologist personally participated in the most demanding procedures (including induction and emergence if applicable) in the anesthesia plan, monitored the course of anesthesia administration at frequent intervals and remained physically present and available for immediate diagnosis and treatment of emergencies.

## 2023-01-05 NOTE — OP NOTE
URETEROSCOPY LASER LITHOTRIPSY WITH STENT INSERTION  Procedure Report    Patient Name:  Chaparro Weber  YOB: 1955    Date of Surgery:  1/5/2023     Pre-op Diagnosis:   Kidney stone [N20.0]       Post-Op Diagnosis Codes:     * Kidney stone [N20.0]      Procedure/CPT® Codes:    Procedure(s):  1.  LEFT URETEROSCOPY LASER LITHOTRIPSY WITH LEFT URETERAL STENT INSERTION   2.  RIGHT URETEROSCOPY, STONE BASKET EXTRACTION WITH RIGHT URETERAL STENT INSERTION       Staff:  Surgeon(s):  Ann Mon MD         Anesthesia: General    Estimated Blood Loss: none    Implants:    Implant Name Type Inv. Item Serial No.  Lot No. LRB No. Used Action   STNT URETRL CLASSC DBLPIG HC 4.5F 30CM - [id number] Stent STNT URETRL CLASSC DBLPIG HC 4.5F 30CM  GYRUS-OLYMPUS NEREIDA [id number] Right 1 Implanted   STNT URETRL CLASSC DBLPIG HC 4.5F 30CM - [id number] Stent STNT URETRL CLASSC DBLPIG HC 4.5F 30CM  GYRUS-OLYMPUS NEREIDA [id number] Left 1 Implanted       Specimen:          A: Renal stones        Complications: None    Description of Procedure:     After proper consent was obtained, patient was taken to operating room and placed in the dorsal lithotomy position.  The patient was prepped and draped in the normal sterile fashion for a right ureteroscopy.      A 22 Kittitian rigid cystoscopy was passed per urethra into the bladder.  The bladder was inspected in a systemic meridian fashion.  No stones, tumors or other abnormalities were seen.      A glide wire was passed up the right ureteral orifice without difficulty.  A dual lumen catheter was placed and a second wire was placed as a safety wire.  Over the working wire a ureteral access sheath was passed.  A flexible scope was then passed into the ureter.  The right renal stones were encountered in the right renal pelvis.   There were several stones in the upper, mid and lower poles.    A stone basket was placed into the ureter and the stones were grasped and  removed.      There was no evidence of injury to the ureter.  The ureteroscope was removed.      I then proceeded with the left side.     A glide wire was passed up the left ureteral orifice without difficulty.  A dual lumen catheter was placed and a second wire was placed as a safety wire.  Over the working wire a ureteral access sheath was passed.  A flexible scope was then passed into the ureter.  There were several larger stones encountered in the left upper pole .  One of these was too large for removal without breaking it up.     A 270 laser fiber was used to break the large stone up into several small pieces.  A stone basket was placed into the ureter and the stones were grasped and removed.  The other smaller stones were removed as well.    There was no evidence of injury to the ureter.  The ureteroscope was removed.  Over the wire, a 6 Bengali 30 cm stent was passed.  Under fluoroscopy it was seen curling in the renal pelvis and under cystoscopy was seen curling in the bladder.  The cystoscope was removed.      A string was  left on the stent.  I then placed a stent on the right as well.     The patient tolerated the procedure well and was transferred to the PACU in stable condition.      Ann Mon MD     Date: 1/5/2023  Time: 12:10 EST

## 2023-01-09 ENCOUNTER — APPOINTMENT (OUTPATIENT)
Dept: DIABETES SERVICES | Facility: HOSPITAL | Age: 68
End: 2023-01-09
Payer: MEDICARE

## 2023-01-11 ENCOUNTER — OFFICE VISIT (OUTPATIENT)
Dept: UROLOGY | Facility: CLINIC | Age: 68
End: 2023-01-11
Payer: MEDICARE

## 2023-01-11 VITALS — WEIGHT: 253 LBS | BODY MASS INDEX: 32.47 KG/M2 | HEIGHT: 74 IN

## 2023-01-11 DIAGNOSIS — R35.1 BENIGN PROSTATIC HYPERPLASIA WITH NOCTURIA: ICD-10-CM

## 2023-01-11 DIAGNOSIS — N40.1 BENIGN PROSTATIC HYPERPLASIA WITH NOCTURIA: ICD-10-CM

## 2023-01-11 DIAGNOSIS — N20.0 KIDNEY STONE: Primary | ICD-10-CM

## 2023-01-11 LAB
BILIRUB BLD-MCNC: NEGATIVE MG/DL
CLARITY, POC: CLEAR
COLOR UR: YELLOW
EXPIRATION DATE: ABNORMAL
GLUCOSE UR STRIP-MCNC: ABNORMAL MG/DL
KETONES UR QL: NEGATIVE
LEUKOCYTE EST, POC: NEGATIVE
Lab: ABNORMAL
NITRITE UR-MCNC: NEGATIVE MG/ML
PH UR: 5 [PH] (ref 5–8)
PROT UR STRIP-MCNC: ABNORMAL MG/DL
RBC # UR STRIP: ABNORMAL /UL
SP GR UR: 1.03 (ref 1–1.03)
UROBILINOGEN UR QL: ABNORMAL

## 2023-01-11 PROCEDURE — 81003 URINALYSIS AUTO W/O SCOPE: CPT | Performed by: UROLOGY

## 2023-01-11 PROCEDURE — 99214 OFFICE O/P EST MOD 30 MIN: CPT | Performed by: UROLOGY

## 2023-01-11 RX ORDER — TAMSULOSIN HYDROCHLORIDE 0.4 MG/1
2 CAPSULE ORAL DAILY
Qty: 180 CAPSULE | Refills: 3 | Status: SHIPPED | OUTPATIENT
Start: 2023-01-11 | End: 2024-01-06

## 2023-01-11 NOTE — PROGRESS NOTES
"Chief Complaint  Kidney stones    Subjective          Chaparro Weber presents to [facility] UROLOG[facility]  History of Present Illness    The patient presents for a follow-up from bilateral ureteroscopy with laser on the right and stone extraction on the left. He had bilateral ureteral stents in place.    The patient reports he is doing well. He had kidney stones approximately 8 to 10 years ago. He drinks a lot of water. He states he plays pickleball. He reports his back has been bothering him. He had a little pain yesterday and this morning. He states he is feeling good. Both of his stents are out. He was working on his prostate and it was really enlarged. The medicine he was put on did not seem to work. He stopped taking it because of the stones. He has had a lot of prostate issues for a while.      Objective   Vital Signs:   Ht 188 cm (74\")   Wt 115 kg (253 lb)   BMI 32.48 kg/m²       Physical Exam  Vitals and nursing note reviewed.   Constitutional:       Appearance: Normal appearance. He is well-developed.   Pulmonary:      Effort: Pulmonary effort is normal.      Breath sounds: Normal air entry.   Neurological:      Mental Status: He is alert and oriented to person, place, and time.      Motor: Motor function is intact.   Psychiatric:         Mood and Affect: Mood normal.         Behavior: Behavior normal.          Result Review :       Results for orders placed or performed in visit on 01/11/23   POC Urinalysis Dipstick, Automated    Specimen: Urine   Result Value Ref Range    Color Yellow Yellow, Straw, Dark Yellow, Salma    Clarity, UA Clear Clear    Specific Gravity  1.030 1.005 - 1.030    pH, Urine 5.0 5.0 - 8.0    Leukocytes Negative Negative    Nitrite, UA Negative Negative    Protein,  mg/dL (A) Negative mg/dL    Glucose, UA >=1000 mg/dL (3+) (A) Negative mg/dL    Ketones, UA Negative Negative    Urobilinogen, UA 0.2 E.U./dL Normal, 0.2 E.U./dL    Bilirubin Negative Negative    " Blood, UA Large (A) Negative    Lot Number [id number]     Expiration Date 22,024        PSA    PSA 7/29/22   PSA <0.014                  Assessment and Plan    Diagnoses and all orders for this visit:    1. Kidney stone (Primary)  -     POC Urinalysis Dipstick, Automated    2. Benign prostatic hyperplasia with nocturia  -     tamsulosin (FLOMAX) 0.4 MG capsule 24 hr capsule; Take 2 capsules by mouth Daily for 360 days.  Dispense: 180 capsule; Refill: 3    - Will see him back with a KUB in 1 year and he will follow back up with April.  - Will increase his Flomax today to 2 a day and I will follow back up with her in about 3 months to see if that has been helpful.          Follow Up       No follow-ups on file.  Patient was given instructions and counseling regarding his condition or for health maintenance advice. Please see specific information pulled into the AVS if appropriate.     Transcribed from ambient dictation for Ann Mon MD by Christina Brewer.  01/11/23   11:11 EST    Patient or patient representative verbalized consent to the visit recording.  I have personally performed the services described in this document as transcribed by the above individual, and it is both accurate and complete.  Ann Mon MD  1/13/2023  15:15 EST

## 2023-01-14 LAB
CALCIUM OXALATE DIHYDRATE MFR STONE IR: 20 %
COLOR STONE: NORMAL
COM MFR STONE: 80 %
COMPN STONE: NORMAL
LABORATORY COMMENT REPORT: NORMAL
Lab: NORMAL
Lab: NORMAL
PHOTO: NORMAL
SIZE STONE: NORMAL MM
SPEC SOURCE SUBJ: NORMAL
WT STONE: 365 MG

## 2023-01-23 ENCOUNTER — TELEPHONE (OUTPATIENT)
Dept: DIABETES SERVICES | Facility: HOSPITAL | Age: 68
End: 2023-01-23

## 2023-02-06 ENCOUNTER — NUTRITION (OUTPATIENT)
Dept: DIABETES SERVICES | Facility: HOSPITAL | Age: 68
End: 2023-02-06
Payer: MEDICARE

## 2023-02-06 VITALS — WEIGHT: 253.53 LBS | HEIGHT: 74 IN | BODY MASS INDEX: 32.54 KG/M2

## 2023-02-06 DIAGNOSIS — E11.65 UNCONTROLLED TYPE 2 DIABETES MELLITUS WITH HYPERGLYCEMIA: Primary | ICD-10-CM

## 2023-02-06 PROCEDURE — 97802 MEDICAL NUTRITION INDIV IN: CPT | Performed by: DIETITIAN, REGISTERED

## 2023-02-06 NOTE — PROGRESS NOTES
"  Chaparro Weber is a 67 y.o. male who presents to [facility] for nutrition consult r/t diagnosis of T2DM, pt states he was diagnosed about 10 years ago.  Chaparro Weber is referred by ANGLE Nash.    Past Medical History:   Diagnosis Date   • Anxiety    • Arthritis    • Asthma     - PT DENIED ASTHMA- NO INHALERS   • Bradycardia     DEFBIRLLATOR/ PACEMAKER- MEDITRONIC- SEE'S DR. HAMEED DENIED CP/SOB   • Chest pain     NO RECENT CP   • CHF (congestive heart failure) (HCC)     ASYMPTOMATIC   • Condition not found     Hernia-Unspecified   • Coronary artery disease    • Deafness    • Diabetes (HCC)     BG RUNS AROUND 250-300 IN AM   • HBP (high blood pressure)    • Heart disease    • High cholesterol    • Kidney stones    • Lung disease    • Night sweats    • Ringing in ear    • Sleep apnea    • Type 2 diabetes mellitus with autonomic neuropathy (HCC) 07/16/2021       Anthropometrics    188 cm (74\")  115 kg (253 lb 8.5 oz)  32.55 kg/m²    Diabetes History    Diabetes History  What type of diabetes do you have?: Type 2  Length of Diabetes Diagnosis: 10 + years  Current DM knowledge: good  Do you test your blood sugar at home?: yes  Frequency of checks: 1x/day  Who performs the test?: self  Typical readings: fasting 211, 285, 300, usually 300-325    Education Preferences    Education Preferences  What areas of diabetes would you like to learn about?: diet information    Nutrition Information    Nutrition Information  Enter everything you can remember eating in the last 24 hours (1 day): breakfast- egg sandwich; lunch- sandwich or low carb wrap; dinner- ham and cheese wrap; snacks- less sweets, some chips; beverages- water, unsweet tea, Gatorade Zero, coffee  What is the biggest challenge you have with your diet?: Knowledge    Education Needs    DM Education Needs  Meter: Has own  Medication: Oral, Insulin, Other injectables  Healthy Eating: RD consult, Reviewed meal plan, " Basic meal plan provided  Motivation: Engaged  Teaching Method: Explanation, Discussion, Handouts  Patient Response: Verbalized understanding    DM Goals    DM Goals  Healthy Eating - Goal: Today  Being Active - Goal: Today  Taking Medication - Goal: Today  Monitoring - Goal: Today  Contact Plan: Follow-up medical care      Medications    Current Outpatient Medications   Medication   • aspirin EC   • celecoxib   • cholecalciferol   • Diclofenac Sodium   • escitalopram   • fenofibrate   • glimepiride   • glucose blood   • glucose blood   • HYDROcodone-acetaminophen   • ibuprofen   • Toujeo Max SoloStar   • Pen Needles   • methylcellulose (Laxative)   • metoprolol succinate XL   • oxyCODONE-acetaminophen   • pregabalin   • Metamucil   • tamsulosin   • valsartan   • vitamin B-12   • vitamin E   • Zinc     Labs         Lab Results   Component Value Date    CHOL 171 07/29/2022    TRIG 817 (H) 07/29/2022    HDL 22 (L) 07/29/2022    LDL  07/29/2022      Comment:      Unable to calculate    LDL 31 07/29/2022 December 8, 2022- A1c 11.8%    Nutrition counseling provided on carbohydrate counting, portion control, measuring and reading labels.  Discussed eating out and gave suggestions on controlling carbohydrate intake and making healthier food choices.     Meal Plan:     Total Carbohydrates per meal: 3-4 carb servings/meal, at least 3 meals/day; also discussed smaller more frequent meals, pt notes appetite change w/ Trulicity  Lean protein with meals.  Limit added fats.  Snacks: 1 carbohydrate serving (</= 22 g) + 1 protein serving.     Daily exercise encouraged (as recommended by healthcare provider). Discussed the benefits of exercise in lowering blood glucose, blood pressure, cholesterol, stress and controlling body weight.     Advised to continue daily blood glucose monitoring to assist with understanding of factors affecting blood glucose and assist with management of diabetes.  Discussed and provided with target BG  ranges.  Pt is interested in CGM, he has talked to Kathy Garcia MA, and would like to know the status.  RD to pass along message and Kathy will contact pt for further discussion.    Literature provided: Diabetes Nutrition Placemat, Choose Your Foods Booklet    Pt requests to return for follow up visit, scheduled April 17.  He will also continue follow up visits w/ ANGLE Nash.  Next visit in March.    Dietitian contact number provided.  Patient encouraged to call with questions or concerns.     Time spent with patient: 45 minutes    Darcy Agrawal RDN, LD  02/06/2023

## 2023-02-10 ENCOUNTER — TELEPHONE (OUTPATIENT)
Dept: DIABETES SERVICES | Facility: HOSPITAL | Age: 68
End: 2023-02-10
Payer: MEDICARE

## 2023-02-14 ENCOUNTER — TELEPHONE (OUTPATIENT)
Dept: DIABETES SERVICES | Facility: HOSPITAL | Age: 68
End: 2023-02-14
Payer: MEDICARE

## 2023-03-10 NOTE — PROGRESS NOTES
Chief Complaint  Diabetes (Follow up, med mgt, a1c eval, cgm eval )    Referred By: DO Zander Guevara presents to [facility] for diabetes medication management    History of Present Illness    Visit type:  follow-up  Diabetes type:  Type 2  Current diabetes status/concerns/issues:  He has progressively increased his insulin to achieve better glucose levels  Other health concerns: He underwent lithotripsy in January of this year.  He is due for battery replacement for his pacemaker/defibrillator  Current Diabetes symptoms:    Polyuria: No   Polydipsia: Yes   Polyphagia: No   Blurred vision: No   Excessive fatigue: Yes   Known Diabetes complications:  Neuro: He describes some neuropathy symptoms primarily affecting the bottom of his feet.  He states he has some tingling itchy feelings  Renal: Stage II renal disease  Eyes: None  Amputation/Wounds: None  GI: None  Cardiovascular: Hypertension, hypertriglyceridemia, congestive heart failure, cardiomyopathy  ED: None   Other: None  Hypoglycemia:  None reported at this time  Hypoglycemia Symptoms:  No hypoglycemia at this time  Current diabetes treatment:  Glimepiride 4 mg taking 1 tablet twice a day, Januvia 100 mg once a day.  Trulicity 1.5 mg was added at the last appointment as well as Basaglar he has increased this to 65 units up from 20 units once a day.  Blood glucose device:  CallidusCloud CGM  Blood glucose monitoring frequency:  Continuous per CGM  Blood glucose range/average: The 14-day sensor report shows a glucose average of 249 mg/dL with only 6% of glucose levels following target range between  with 94% above target.     Glucose Source: Device Reviewed  Diet:  Limits high carb/sweet foods, Avoids sugary drinks  Activity/Exercise:  he is limited due to breathing issues and back pain    Past Medical History:   Diagnosis Date   • Anxiety    • Arthritis    • Asthma     - PT DENIED ASTHMA- NO  INHALERS   • Bradycardia     DEFBIRLLATOR/ PACEMAKER- MEDITRONIC- SEE'S DR. HAMEED DENIED CP/SOB   • Chest pain     NO RECENT CP   • CHF (congestive heart failure)     ASYMPTOMATIC   • Condition not found     Hernia-Unspecified   • Coronary artery disease    • Deafness    • Diabetes     BG RUNS AROUND 250-300 IN AM   • HBP (high blood pressure)    • Heart disease    • High cholesterol    • Kidney stones    • Lung disease    • Night sweats    • Ringing in ear    • Sleep apnea    • Type 2 diabetes mellitus with autonomic neuropathy 07/16/2021     Past Surgical History:   Procedure Laterality Date   • APPENDECTOMY     • BACK SURGERY  2000,2010    lumbar with hardware   • CARDIAC DEFIBRILLATOR PLACEMENT      MEDITRONIC   • CHOLECYSTECTOMY     • COLONOSCOPY N/A 06/22/2022    Procedure: COLONOSCOPY;  Surgeon: Nicholas Townsend MD;  Location: [facility] ENDOSCOPY;  Service: General;  Laterality: N/A;  DIVERTICULOSIS   • CYSTOSCOPY W/ STONE MANIPULATION Right 1/5/2023    Procedure: CYSTOSCOPY KIDNEY STONE MANIPULATION/EXTRACTION;  Surgeon: Ann Mon MD;  Location: [facility] MAIN OR;  Service: Urology;  Laterality: Right;   • KIDNEY STONE SURGERY     • NECK SURGERY  2005    removal of bone spurs   • PACEMAKER IMPLANTATION      10 YEARS AGO PLACED- MEDITRONIC   • URETEROSCOPY LASER LITHOTRIPSY WITH STENT INSERTION Left 1/5/2023    Procedure: LEFT URETEROSCOPY LASER LITHOTRIPSY WITH STENT INSERTION AND RETROGRADE; RIGHT URETEROSCOPY WITH STENT INSERTION AND RETROGRADE;  Surgeon: Ann Mon MD;  Location: [facility] MAIN OR;  Service: Urology;  Laterality: Left;     Family History   Problem Relation Age of Onset   • Other Mother 60        Renal Cancer   • Prostate cancer Father    • Colon cancer Father 70   • Malig Hyperthermia Neg Hx      Social History     Socioeconomic History   • Marital status:    • Number of children: 2   Tobacco Use   • Smoking status: Former     Packs/day: 1.50     Years: 40.00     Pack  years: 60.00     Types: Cigarettes     Start date: 1965     Quit date:      Years since quittin.2   • Smokeless tobacco: Never   • Tobacco comments:     no second hand smoke exposure   Vaping Use   • Vaping Use: Never used   Substance and Sexual Activity   • Alcohol use: Never   • Drug use: Never   • Sexual activity: Defer     Allergies   Allergen Reactions   • Codeine Nausea Only and Nausea And Vomiting       Current Outpatient Medications:   •  aspirin  MG tablet, Take 1 tablet by mouth Daily. PT STATED DR CHEN OFFICE INSTRUCTED TO STOP 5 DAYS PRIOR TO SURGERY, LAST DOSE PT STATED INSTRUCTED TO TAKE IS 22, Disp: , Rfl:   •  celecoxib (CeleBREX) 100 MG capsule, Take 1 capsule by mouth Daily., Disp: , Rfl:   •  cholecalciferol (VITAMIN D3) 25 MCG (1000 UT) tablet, Take 2 tablets by mouth Daily., Disp: , Rfl:   •  Diclofenac Sodium (VOLTAREN) 1 % gel gel, , Disp: , Rfl:   •  escitalopram (LEXAPRO) 20 MG tablet, TAKE ONE TABLET BY MOUTH ONCE DAILY, Disp: 90 tablet, Rfl: 1  •  fenofibrate (TRICOR) 145 MG tablet, TAKE ONE TABLET BY MOUTH ONCE DAILY, Disp: 90 tablet, Rfl: 1  •  glimepiride (Amaryl) 4 MG tablet, Take 1 tablet by mouth Daily With Breakfast & Dinner. Take 1 tablet for one week. Then if blood sugar is above 160 in am fasting increase to 2 tablet daily., Disp: 60 tablet, Rfl: 5  •  glucose blood test strip, Use as instructed, Disp: 100 each, Rfl: 2  •  glucose blood test strip, OneTouch Ultra Test strips, Disp: , Rfl:   •  HYDROcodone-acetaminophen (NORCO) 7.5-325 MG per tablet, hydrocodone 7.5 mg-acetaminophen 325 mg tablet, Disp: , Rfl:   •  ibuprofen (ADVIL,MOTRIN) 200 MG tablet, Take 1 tablet by mouth Every 6 (Six) Hours As Needed for Mild Pain., Disp: , Rfl:   •  Insulin Glargine, 2 Unit Dial, (Toujeo Max SoloStar) 300 UNIT/ML solution pen-injector injection, Inject 20 Units under the skin into the appropriate area as directed Every Night. (Patient taking differently: Inject 20  Units under the skin into the appropriate area as directed Every Night. INSTRUCTED PER ANESTHESIA STANDING ORDERS), Disp: 3 mL, Rfl: 0  •  Insulin Pen Needle (Pen Needles) 32G X 5 MM misc, 1 each Every Night., Disp: 100 each, Rfl: 1  •  methylcellulose, Laxative, (CITRUCEL) 500 MG tablet tablet, Citrucel 500 mg oral tablet take 1 tablet by oral route daily   Active, Disp: , Rfl:   •  metoprolol succinate XL (TOPROL-XL) 25 MG 24 hr tablet, Take 1 tablet by mouth Daily., Disp: , Rfl:   •  pregabalin (LYRICA) 300 MG capsule, Take 1 capsule by mouth Daily., Disp: , Rfl:   •  psyllium (Metamucil) 0.52 g capsule, , Disp: , Rfl:   •  tamsulosin (FLOMAX) 0.4 MG capsule 24 hr capsule, Take 2 capsules by mouth Daily for 360 days., Disp: 180 capsule, Rfl: 3  •  vitamin B-12 (CYANOCOBALAMIN) 1000 MCG tablet, Take 1,200 mcg by mouth Daily., Disp: , Rfl:   •  vitamin E 100 UNIT capsule, Take 1 capsule by mouth Daily., Disp: , Rfl:   •  Zinc 50 MG capsule, Take  by mouth., Disp: , Rfl:   •  Insulin Lispro-aabc (Lyumjev Stephanie) 200 UNIT/ML solution pen-injector, Inject 25 Units under the skin into the appropriate area as directed 3 (Three) Times a Day Before Meals., Disp: 6 mL, Rfl: 0  •  oxyCODONE-acetaminophen (PERCOCET) 5-325 MG per tablet, Take 1-2 tablets by mouth Every 4 (Four) Hours As Needed for Moderate Pain or Severe Pain (Pain)., Disp: 20 tablet, Rfl: 0  •  rosuvastatin (Crestor) 10 MG tablet, Take 1 tablet by mouth Daily., Disp: 90 tablet, Rfl: 1  •  valsartan (DIOVAN) 160 MG tablet, TAKE ONE TABLET BY MOUTH ONCE DAILY FOR BLOOD PRESSURE, Disp: 90 tablet, Rfl: 0    Review of Systems   Constitutional: Positive for activity change and fatigue. Negative for appetite change, unexpected weight gain and unexpected weight loss.   Eyes: Negative for blurred vision and visual disturbance.   Gastrointestinal: Negative for abdominal pain, constipation, diarrhea, nausea, vomiting, GERD and indigestion.   Endocrine: Positive for  "polydipsia. Negative for polyphagia and polyuria.   Neurological: Positive for numbness (feet and back).        Objective     Vitals:    03/13/23 1338   BP: 90/58   BP Location: Left arm   Patient Position: Sitting   Cuff Size: Adult   Pulse: 77   Temp: 97.9 °F (36.6 °C)   SpO2: 94%   Weight: 116 kg (256 lb)   Height: 188 cm (74\")   PainSc:   6     Body mass index is 32.87 kg/m².    Physical Exam  Constitutional:       Appearance: Normal appearance. He is obese.      Comments: Obesity with BMI of 32.87   HENT:      Head: Normocephalic and atraumatic.      Right Ear: External ear normal.      Left Ear: External ear normal.      Nose: Nose normal.   Eyes:      Extraocular Movements: Extraocular movements intact.      Conjunctiva/sclera: Conjunctivae normal.   Pulmonary:      Effort: Pulmonary effort is normal.   Musculoskeletal:         General: Normal range of motion.      Cervical back: Normal range of motion.   Skin:     General: Skin is warm and dry.   Neurological:      General: No focal deficit present.      Mental Status: He is alert and oriented to person, place, and time. Mental status is at baseline.   Psychiatric:         Mood and Affect: Mood normal.         Behavior: Behavior normal.         Thought Content: Thought content normal.         Judgment: Judgment normal.         Result Review :   The following data was reviewed by: ANGLE Mcdonnell on 03/13/2023:    Most Recent A1C    HGBA1C Most Recent 3/27/23   Hemoglobin A1C 10.30 (A)   (A) Abnormal value              A1C Last 3 Results    HGBA1C Last 3 Results 12/8/22 3/13/23 3/27/23   Hemoglobin A1C 11.8 10.1 (A) 10.30 (A)   (A) Abnormal value            Point-of-care A1c in the office today is 10.1% indicating uncontrolled type 2 diabetes.  This is down from the prior result of 11.8 collected in December 2022    Glucose   Date Value Ref Range Status   03/13/2023 290 (H) 70 - 99 mg/dL Final     Comment:     Serial Number: [id number]:  " [id number]     Point-of-care glucose in the office is elevated at 290 mg/dL    Creatinine   Date Value Ref Range Status   03/27/2023 1.03 0.76 - 1.27 mg/dL Final   01/05/2023 0.96 0.76 - 1.27 mg/dL Final     eGFR   Date Value Ref Range Status   03/27/2023 80.6 >60.0 mL/min/1.73 Final   01/05/2023 87.7 >60.0 mL/min/1.73 Final     Comment:     National Kidney Foundation and American Society of Nephrology (ASN) Task Force recommended calculation based on the Chronic Kidney Disease Epidemiology Collaboration (CKD-EPI) equation refit without adjustment for race.     Labs collected on 1/5/2023 shows stage II renal disease          Assessment: The patient has been progressively increasing his Basaglar to achieve better glycemic control however the CGM report indicates significantly elevated glucose levels.  He has had some improvement in his A1c since last being seen.      Diagnoses and all orders for this visit:    1. Uncontrolled type 2 diabetes mellitus with hyperglycemia (Primary)  -     POC Glycosylated Hemoglobin (Hb A1C)  -     POC Glucose  -     Insulin Lispro-aabc (Lyumjev KwikPen) 200 UNIT/ML solution pen-injector; Inject 25 Units under the skin into the appropriate area as directed 3 (Three) Times a Day Before Meals.  Dispense: 6 mL; Refill: 0    2. Type 2 diabetes mellitus with diabetic neuropathy, with long-term current use of insulin    3. Type 2 diabetes mellitus with stage 2 chronic kidney disease, with long-term current use of insulin    4. Obesity (BMI 30-39.9)    5. Uses self-applied continuous glucose monitoring device        Plan: The patient continue taking the Januvia 100 mg once a day, Trulicity 1.5 mg once weekly and Basaglar 65 units once a day.  We will discontinue the glimepiride and transition to the patient to using the Lyumjev insulin before each meal taking 8, 10, or 12 units based on small, medium, or large amounts of carbohydrates plus adding correction for glucose levels greater than 150  mg/dL starting with 2 units and increasing by 1 unit for every 25 mg/dL thereafter.    The patient will monitor his blood glucose levels using the continuous glucose sensor.  If he develops problematic hyperglycemia or hypoglycemia or adverse drug reactions, he will contact the office for further instructions.        Follow Up     Return in about 2 months (around 5/13/2023) for Medication Management, CGM Follow-up.    Patient was given instructions and counseling regarding his condition or for health maintenance advice. Please see specific information pulled into the AVS if appropriate.     Suzanna Agosto, ANGLE  03/13/2023      Dictated Utilizing Dragon Dictation.  Please note that portions of this note were completed with a voice recognition program.  Part of this note may be an electronic transcription/translation of spoken language to printed text using the Dragon Dictation System.

## 2023-03-13 ENCOUNTER — OFFICE VISIT (OUTPATIENT)
Dept: DIABETES SERVICES | Facility: HOSPITAL | Age: 68
End: 2023-03-13
Payer: MEDICARE

## 2023-03-13 VITALS
HEIGHT: 74 IN | TEMPERATURE: 97.9 F | WEIGHT: 256 LBS | OXYGEN SATURATION: 94 % | HEART RATE: 77 BPM | BODY MASS INDEX: 32.85 KG/M2 | SYSTOLIC BLOOD PRESSURE: 90 MMHG | DIASTOLIC BLOOD PRESSURE: 58 MMHG

## 2023-03-13 DIAGNOSIS — Z79.4 TYPE 2 DIABETES MELLITUS WITH STAGE 2 CHRONIC KIDNEY DISEASE, WITH LONG-TERM CURRENT USE OF INSULIN: ICD-10-CM

## 2023-03-13 DIAGNOSIS — E11.22 TYPE 2 DIABETES MELLITUS WITH STAGE 2 CHRONIC KIDNEY DISEASE, WITH LONG-TERM CURRENT USE OF INSULIN: ICD-10-CM

## 2023-03-13 DIAGNOSIS — E11.65 UNCONTROLLED TYPE 2 DIABETES MELLITUS WITH HYPERGLYCEMIA: Primary | ICD-10-CM

## 2023-03-13 DIAGNOSIS — E66.9 OBESITY (BMI 30-39.9): ICD-10-CM

## 2023-03-13 DIAGNOSIS — Z79.4 TYPE 2 DIABETES MELLITUS WITH DIABETIC NEUROPATHY, WITH LONG-TERM CURRENT USE OF INSULIN: ICD-10-CM

## 2023-03-13 DIAGNOSIS — E11.40 TYPE 2 DIABETES MELLITUS WITH DIABETIC NEUROPATHY, WITH LONG-TERM CURRENT USE OF INSULIN: ICD-10-CM

## 2023-03-13 DIAGNOSIS — Z97.8 USES SELF-APPLIED CONTINUOUS GLUCOSE MONITORING DEVICE: ICD-10-CM

## 2023-03-13 DIAGNOSIS — N18.2 TYPE 2 DIABETES MELLITUS WITH STAGE 2 CHRONIC KIDNEY DISEASE, WITH LONG-TERM CURRENT USE OF INSULIN: ICD-10-CM

## 2023-03-13 LAB
EXPIRATION DATE: ABNORMAL
GLUCOSE BLDC GLUCOMTR-MCNC: 290 MG/DL (ref 70–99)
HBA1C MFR BLD: 10.1 %
Lab: ABNORMAL

## 2023-03-13 PROCEDURE — 3074F SYST BP LT 130 MM HG: CPT | Performed by: NURSE PRACTITIONER

## 2023-03-13 PROCEDURE — 3078F DIAST BP <80 MM HG: CPT | Performed by: NURSE PRACTITIONER

## 2023-03-13 PROCEDURE — G0463 HOSPITAL OUTPT CLINIC VISIT: HCPCS | Performed by: NURSE PRACTITIONER

## 2023-03-13 PROCEDURE — 3046F HEMOGLOBIN A1C LEVEL >9.0%: CPT | Performed by: NURSE PRACTITIONER

## 2023-03-13 PROCEDURE — 82962 GLUCOSE BLOOD TEST: CPT | Performed by: NURSE PRACTITIONER

## 2023-03-13 PROCEDURE — 99214 OFFICE O/P EST MOD 30 MIN: CPT | Performed by: NURSE PRACTITIONER

## 2023-03-13 PROCEDURE — 95251 CONT GLUC MNTR ANALYSIS I&R: CPT | Performed by: NURSE PRACTITIONER

## 2023-03-13 NOTE — PATIENT INSTRUCTIONS
Stop the glimepiride    Continue the Januvia 100 mg once a day, the Trulicity 1.5 mg once weekly, and the Basaglar 65 units once a day    Begin taking Lyumjev insulin before each meal as follows:    8 units for “small meal” (30 - 45 grams of carbohydrate)        (may take ½ dose if eating less than 30 grams)  10 units for “medium meal” (45 - 60 grams of carbohydrate)  12 units for “large meal” (60 - 90 grams of carbohydrate)     Add to the meal dose:    If blood glucose is less than 150 mg/dl - 0 units  If blood glucose is between 151 and 175 - 2 units  If blood glucose is between 176 and 200 - 3 units  If blood glucose is between 201 and 225 - 4 units  If blood glucose is between 226 and 250 - 5 units  If blood glucose is between 251 and 275 - 6 units  If blood glucose is between 276 and 300 - 7 units  If blood glucose is between 301 and 325 - 8 units  If blood glucose is between 326 and 350 - 9 units  If blood glucose is 351 or above - 10 units

## 2023-03-23 ENCOUNTER — TELEPHONE (OUTPATIENT)
Dept: FAMILY MEDICINE CLINIC | Facility: CLINIC | Age: 68
End: 2023-03-23
Payer: MEDICARE

## 2023-03-23 DIAGNOSIS — E55.9 VITAMIN D DEFICIENCY: ICD-10-CM

## 2023-03-23 DIAGNOSIS — D75.1 ACQUIRED POLYCYTHEMIA: ICD-10-CM

## 2023-03-23 DIAGNOSIS — E78.2 MIXED HYPERLIPIDEMIA: Primary | Chronic | ICD-10-CM

## 2023-03-23 DIAGNOSIS — R53.83 OTHER FATIGUE: ICD-10-CM

## 2023-03-23 DIAGNOSIS — E11.43 TYPE 2 DIABETES MELLITUS WITH DIABETIC AUTONOMIC NEUROPATHY, WITHOUT LONG-TERM CURRENT USE OF INSULIN: Chronic | ICD-10-CM

## 2023-03-23 DIAGNOSIS — E53.8 B12 DEFICIENCY: ICD-10-CM

## 2023-03-23 NOTE — TELEPHONE ENCOUNTER
"     Caller: Chaparro Weber \"Lane\"    Relationship: Self    Best call back number: [phone]    What orders are you requesting (i.e. lab or imaging): LABS     In what timeframe would the patient need to come in: BEFORE APPOINTMENT            Additional notes:        THE PATIENT IS REQUESTING ORDER FOR BLOOD WORK   HE IS WANTING THIS DONE BEFORE HIS APPOINTMENT ON 3/30/23  "

## 2023-03-24 NOTE — TELEPHONE ENCOUNTER
Left message for patient that fasting lab orders have been placed for him to complete prior to next appt on 3/30/23.

## 2023-03-27 ENCOUNTER — LAB (OUTPATIENT)
Dept: LAB | Facility: HOSPITAL | Age: 68
End: 2023-03-27
Payer: MEDICARE

## 2023-03-27 DIAGNOSIS — E53.8 B12 DEFICIENCY: ICD-10-CM

## 2023-03-27 DIAGNOSIS — R53.83 OTHER FATIGUE: ICD-10-CM

## 2023-03-27 DIAGNOSIS — E78.2 MIXED HYPERLIPIDEMIA: Chronic | ICD-10-CM

## 2023-03-27 DIAGNOSIS — E55.9 VITAMIN D DEFICIENCY: ICD-10-CM

## 2023-03-27 DIAGNOSIS — E11.43 TYPE 2 DIABETES MELLITUS WITH DIABETIC AUTONOMIC NEUROPATHY, WITHOUT LONG-TERM CURRENT USE OF INSULIN: Chronic | ICD-10-CM

## 2023-03-27 DIAGNOSIS — D75.1 ACQUIRED POLYCYTHEMIA: ICD-10-CM

## 2023-03-27 LAB
25(OH)D3 SERPL-MCNC: 55 NG/ML (ref 30–100)
ALBUMIN SERPL-MCNC: 4.4 G/DL (ref 3.5–5.2)
ALBUMIN UR-MCNC: <1.2 MG/DL
ALBUMIN/GLOB SERPL: 1.8 G/DL
ALP SERPL-CCNC: 43 U/L (ref 39–117)
ALT SERPL W P-5'-P-CCNC: 44 U/L (ref 1–41)
ANION GAP SERPL CALCULATED.3IONS-SCNC: 11 MMOL/L (ref 5–15)
AST SERPL-CCNC: 31 U/L (ref 1–40)
BASOPHILS # BLD AUTO: 0.02 10*3/MM3 (ref 0–0.2)
BASOPHILS NFR BLD AUTO: 0.7 % (ref 0–1.5)
BILIRUB SERPL-MCNC: 0.5 MG/DL (ref 0–1.2)
BUN SERPL-MCNC: 24 MG/DL (ref 8–23)
BUN/CREAT SERPL: 23.3 (ref 7–25)
CALCIUM SPEC-SCNC: 9.8 MG/DL (ref 8.6–10.5)
CHLORIDE SERPL-SCNC: 95 MMOL/L (ref 98–107)
CHOLEST SERPL-MCNC: 205 MG/DL (ref 0–200)
CO2 SERPL-SCNC: 33 MMOL/L (ref 22–29)
CREAT SERPL-MCNC: 1.03 MG/DL (ref 0.76–1.27)
DEPRECATED RDW RBC AUTO: 38.7 FL (ref 37–54)
EGFRCR SERPLBLD CKD-EPI 2021: 80.6 ML/MIN/1.73
EOSINOPHIL # BLD AUTO: 0.05 10*3/MM3 (ref 0–0.4)
EOSINOPHIL NFR BLD AUTO: 1.6 % (ref 0.3–6.2)
ERYTHROCYTE [DISTWIDTH] IN BLOOD BY AUTOMATED COUNT: 12.2 % (ref 12.3–15.4)
FOLATE SERPL-MCNC: 8.33 NG/ML (ref 4.78–24.2)
GLOBULIN UR ELPH-MCNC: 2.5 GM/DL
GLUCOSE SERPL-MCNC: 268 MG/DL (ref 65–99)
HBA1C MFR BLD: 10.3 % (ref 4.8–5.6)
HCT VFR BLD AUTO: 43.6 % (ref 37.5–51)
HDLC SERPL-MCNC: 31 MG/DL (ref 40–60)
HGB BLD-MCNC: 14.5 G/DL (ref 13–17.7)
IMM GRANULOCYTES # BLD AUTO: 0 10*3/MM3 (ref 0–0.05)
IMM GRANULOCYTES NFR BLD AUTO: 0 % (ref 0–0.5)
LDLC SERPL CALC-MCNC: 115 MG/DL (ref 0–100)
LDLC/HDLC SERPL: 3.42 {RATIO}
LYMPHOCYTES # BLD AUTO: 1.27 10*3/MM3 (ref 0.7–3.1)
LYMPHOCYTES NFR BLD AUTO: 41.6 % (ref 19.6–45.3)
MCH RBC QN AUTO: 29.1 PG (ref 26.6–33)
MCHC RBC AUTO-ENTMCNC: 33.3 G/DL (ref 31.5–35.7)
MCV RBC AUTO: 87.6 FL (ref 79–97)
MONOCYTES # BLD AUTO: 0.31 10*3/MM3 (ref 0.1–0.9)
MONOCYTES NFR BLD AUTO: 10.2 % (ref 5–12)
NEUTROPHILS NFR BLD AUTO: 1.4 10*3/MM3 (ref 1.7–7)
NEUTROPHILS NFR BLD AUTO: 45.9 % (ref 42.7–76)
NRBC BLD AUTO-RTO: 0 /100 WBC (ref 0–0.2)
PLATELET # BLD AUTO: 90 10*3/MM3 (ref 140–450)
PMV BLD AUTO: 10.7 FL (ref 6–12)
POTASSIUM SERPL-SCNC: 4.3 MMOL/L (ref 3.5–5.2)
PROT SERPL-MCNC: 6.9 G/DL (ref 6–8.5)
RBC # BLD AUTO: 4.98 10*6/MM3 (ref 4.14–5.8)
SODIUM SERPL-SCNC: 139 MMOL/L (ref 136–145)
TRIGL SERPL-MCNC: 340 MG/DL (ref 0–150)
TSH SERPL DL<=0.05 MIU/L-ACNC: 2.11 UIU/ML (ref 0.27–4.2)
VIT B12 BLD-MCNC: 1577 PG/ML (ref 211–946)
VLDLC SERPL-MCNC: 59 MG/DL (ref 5–40)
WBC NRBC COR # BLD: 3.05 10*3/MM3 (ref 3.4–10.8)

## 2023-03-27 PROCEDURE — 80061 LIPID PANEL: CPT

## 2023-03-27 PROCEDURE — 84443 ASSAY THYROID STIM HORMONE: CPT

## 2023-03-27 PROCEDURE — 82043 UR ALBUMIN QUANTITATIVE: CPT

## 2023-03-27 PROCEDURE — 80053 COMPREHEN METABOLIC PANEL: CPT

## 2023-03-27 PROCEDURE — 82607 VITAMIN B-12: CPT

## 2023-03-27 PROCEDURE — 82306 VITAMIN D 25 HYDROXY: CPT

## 2023-03-27 PROCEDURE — 85025 COMPLETE CBC W/AUTO DIFF WBC: CPT

## 2023-03-27 PROCEDURE — 82746 ASSAY OF FOLIC ACID SERUM: CPT

## 2023-03-27 PROCEDURE — 36415 COLL VENOUS BLD VENIPUNCTURE: CPT

## 2023-03-27 PROCEDURE — 83036 HEMOGLOBIN GLYCOSYLATED A1C: CPT

## 2023-03-28 RX ORDER — VALSARTAN 160 MG/1
TABLET ORAL
Qty: 90 TABLET | Refills: 0 | Status: SHIPPED | OUTPATIENT
Start: 2023-03-28

## 2023-03-30 ENCOUNTER — OFFICE VISIT (OUTPATIENT)
Dept: FAMILY MEDICINE CLINIC | Facility: CLINIC | Age: 68
End: 2023-03-30
Payer: MEDICARE

## 2023-03-30 VITALS
WEIGHT: 259.9 LBS | BODY MASS INDEX: 33.36 KG/M2 | HEART RATE: 84 BPM | SYSTOLIC BLOOD PRESSURE: 111 MMHG | HEIGHT: 74 IN | DIASTOLIC BLOOD PRESSURE: 74 MMHG | OXYGEN SATURATION: 90 % | RESPIRATION RATE: 18 BRPM | TEMPERATURE: 97.2 F

## 2023-03-30 DIAGNOSIS — E78.2 MIXED HYPERLIPIDEMIA: Chronic | ICD-10-CM

## 2023-03-30 DIAGNOSIS — E11.43 TYPE 2 DIABETES MELLITUS WITH DIABETIC AUTONOMIC NEUROPATHY, WITHOUT LONG-TERM CURRENT USE OF INSULIN: Primary | Chronic | ICD-10-CM

## 2023-03-30 DIAGNOSIS — E66.09 CLASS 1 OBESITY DUE TO EXCESS CALORIES WITH SERIOUS COMORBIDITY AND BODY MASS INDEX (BMI) OF 33.0 TO 33.9 IN ADULT: Chronic | ICD-10-CM

## 2023-03-30 DIAGNOSIS — I10 ESSENTIAL HYPERTENSION: Chronic | ICD-10-CM

## 2023-03-30 PROBLEM — E66.811 CLASS 1 OBESITY DUE TO EXCESS CALORIES WITH SERIOUS COMORBIDITY AND BODY MASS INDEX (BMI) OF 32.0 TO 32.9 IN ADULT: Chronic | Status: RESOLVED | Noted: 2021-10-25 | Resolved: 2023-03-30

## 2023-03-30 PROBLEM — M17.9 OSTEOARTHRITIS OF KNEE: Status: ACTIVE | Noted: 2023-01-25

## 2023-03-30 RX ORDER — ROSUVASTATIN CALCIUM 10 MG/1
10 TABLET, COATED ORAL DAILY
Qty: 90 TABLET | Refills: 1 | Status: SHIPPED | OUTPATIENT
Start: 2023-03-30

## 2023-03-30 NOTE — ASSESSMENT & PLAN NOTE
Patient's (Body mass index is 33.35 kg/m².) indicates that they are obese (BMI >30) with health conditions that include hypertension, diabetes mellitus and dyslipidemias . Weight is worsening. BMI  is above average; BMI management plan is completed. We discussed low calorie, low carb based diet program, portion control and increasing exercise.

## 2023-03-30 NOTE — ASSESSMENT & PLAN NOTE
Lipid abnormalities are improving with treatment.  Crestor was added today to help lower his cholesterol.   Lipids will be reassessed in 6 months.    The 10-year ASCVD risk score (Ronak DELUNA, et al., 2019) is: 27.7%    Values used to calculate the score:      Age: 67 years      Sex: Male      Is Non- : No      Diabetic: Yes      Tobacco smoker: No      Systolic Blood Pressure: 111 mmHg      Is BP treated: Yes      HDL Cholesterol: 31 mg/dL      Total Cholesterol: 205 mg/dL

## 2023-03-30 NOTE — PROGRESS NOTES
"Chief Complaint  Diabetes and follow up from pace maker replacement.     Subjective        Chaparro Weber presents to [facility]  History of Present Illness  He is here today for the management of his chronic medical conditions. He has asthma, COPD, congestive heart failure, chronic back pain managed by pain management, CAD with pace maker, diabetes, hypertension, very low testosterone levels, hyperlipidemia, kidney stones.  He has a family history of coronary disease and colon cancer and his dad  at 74.  He is a previous smoker and smoked for approximately 12 years.  He denies alcohol use.     He hurt his knee and has not been playing pickleball lately.     He has gained wt. He has seen blood sugars around 147 but ranges around 200+ average.      The patient has no complaints today and denies chest pain, shortness of breath, weakness, numbness, nausea, vomiting, diarrhea, dizziness or syncopal event.      Objective   Vital Signs:  /74 (BP Location: Left arm, Patient Position: Sitting)   Pulse 84   Temp 97.2 °F (36.2 °C)   Resp 18   Ht 188 cm (74.02\")   Wt 118 kg (259 lb 14.4 oz)   SpO2 90%   BMI 33.35 kg/m²   Estimated body mass index is 33.35 kg/m² as calculated from the following:    Height as of this encounter: 188 cm (74.02\").    Weight as of this encounter: 118 kg (259 lb 14.4 oz).             Physical Exam  Vitals reviewed.   Constitutional:       Appearance: Normal appearance. He is well-developed. He is obese.   HENT:      Head: Normocephalic and atraumatic.      Right Ear: External ear normal.      Left Ear: External ear normal.      Mouth/Throat:      Pharynx: No oropharyngeal exudate.   Eyes:      Conjunctiva/sclera: Conjunctivae normal.      Pupils: Pupils are equal, round, and reactive to light.   Neck:      Vascular: No carotid bruit.   Cardiovascular:      Rate and Rhythm: Normal rate and regular rhythm.      Heart sounds: No murmur heard.    No " friction rub. No gallop.   Pulmonary:      Effort: Pulmonary effort is normal.      Breath sounds: Normal breath sounds. No wheezing or rhonchi.   Abdominal:      General: There is no distension.   Skin:     General: Skin is warm and dry.   Neurological:      Mental Status: He is alert and oriented to person, place, and time.      Cranial Nerves: No cranial nerve deficit.      Motor: No weakness.   Psychiatric:         Mood and Affect: Mood and affect normal.         Behavior: Behavior normal.         Thought Content: Thought content normal.         Judgment: Judgment normal.        Result Review :    CMP    CMP 7/29/22 1/5/23 3/27/23   Glucose 361 (A) 285 (A) 268 (A)   BUN 23 25 (A) 24 (A)   Creatinine 1.10 0.96 1.03   eGFR 75.0 87.7 80.6   Sodium 136 136 139   Potassium 4.5 4.7 4.3   Chloride 99 99 95 (A)   Calcium 9.3 9.2 9.8   Total Protein 6.8  6.9   Albumin 4.40  4.4   Globulin 2.4  2.5   Total Bilirubin 0.2  0.5   Alkaline Phosphatase 52  43   AST (SGOT) 49 (A)  31   ALT (SGPT) 56 (A)  44 (A)   Albumin/Globulin Ratio 1.8  1.8   BUN/Creatinine Ratio 20.9 26.0 (A) 23.3   Anion Gap 11.0 7.9 11.0   (A) Abnormal value       Comments are available for some flowsheets but are not being displayed.           CBC    CBC 7/29/22 3/27/23   WBC 4.55 3.05 (A)   RBC 4.52 4.98   Hemoglobin 13.5 14.5   Hematocrit 40.7 43.6   MCV 90.0 87.6   MCH 29.9 29.1   MCHC 33.2 33.3   RDW 13.1 12.2 (A)   Platelets 103 (A) 90 (A)   (A) Abnormal value            Lipid Panel    Lipid Panel 7/29/22 7/29/22 3/27/23    1335 1335    Total Cholesterol 171  205 (A)   Triglycerides 817 (A)  340 (A)   HDL Cholesterol 22 (A)  31 (A)   VLDL Cholesterol   59 (A)   LDL Cholesterol   31 115 (A)   LDL/HDL Ratio   3.42   (A) Abnormal value       Comments are available for some flowsheets but are not being displayed.           TSH    TSH 7/29/22 3/27/23   TSH 1.730 2.110                        Assessment and Plan   Diagnoses and all orders for this  visit:    1. Type 2 diabetes mellitus with diabetic autonomic neuropathy, without long-term current use of insulin (HCC) (Primary)  Assessment & Plan:  Diabetes is improving with treatment.   Continue current treatment regimen.  Dietary recommendations for ADA diet.  Diabetes will be reassessed in 6 months.      2. Class 1 obesity due to excess calories with serious comorbidity and body mass index (BMI) of 33.0 to 33.9 in adult  Assessment & Plan:  Patient's (Body mass index is 33.35 kg/m².) indicates that they are obese (BMI >30) with health conditions that include hypertension, diabetes mellitus and dyslipidemias . Weight is worsening. BMI  is above average; BMI management plan is completed. We discussed low calorie, low carb based diet program, portion control and increasing exercise.       3. Essential hypertension  Assessment & Plan:  Hypertension is improving with treatment.  Continue current treatment regimen.  Dietary sodium restriction.  Weight loss.  Blood pressure will be reassessed at the next regular appointment.      4. Mixed hyperlipidemia  Assessment & Plan:  Lipid abnormalities are improving with treatment.  Crestor was added today to help lower his cholesterol.   Lipids will be reassessed in 6 months.    The 10-year ASCVD risk score (Ronak DK, et al., 2019) is: 27.7%    Values used to calculate the score:      Age: 67 years      Sex: Male      Is Non- : No      Diabetic: Yes      Tobacco smoker: No      Systolic Blood Pressure: 111 mmHg      Is BP treated: Yes      HDL Cholesterol: 31 mg/dL      Total Cholesterol: 205 mg/dL      Orders:  -     rosuvastatin (Crestor) 10 MG tablet; Take 1 tablet by mouth Daily.  Dispense: 90 tablet; Refill: 1  -     Lipid panel; Future  -     Comprehensive metabolic panel; Future           Follow Up   Return in about 6 months (around 9/30/2023).  Patient was given instructions and counseling regarding his condition or for health maintenance  advice. Please see specific information pulled into the AVS if appropriate.

## 2023-03-31 ENCOUNTER — TELEPHONE (OUTPATIENT)
Dept: DIABETES SERVICES | Facility: HOSPITAL | Age: 68
End: 2023-03-31
Payer: MEDICARE

## 2023-03-31 NOTE — TELEPHONE ENCOUNTER
Pt is questioning  About pt assistance for insulin. Darcy and I would like to know what you would like us to do. Pt said he spoke to you about this last visit..   Pt will need samples soon.   please advise

## 2023-04-01 RX ORDER — INSULIN LISPRO-AABC 200 [IU]/ML
25 INJECTION, SOLUTION SUBCUTANEOUS
Qty: 6 ML | Refills: 0 | COMMUNITY
Start: 2023-04-01

## 2023-04-06 ENCOUNTER — TELEPHONE (OUTPATIENT)
Dept: FAMILY MEDICINE CLINIC | Facility: CLINIC | Age: 68
End: 2023-04-06

## 2023-04-06 NOTE — TELEPHONE ENCOUNTER
Caller: MIKE    What was the call regarding: FAX TO BE SENT OVER.     Do you require a callback: NO

## 2023-04-11 ENCOUNTER — TELEPHONE (OUTPATIENT)
Dept: DIABETES SERVICES | Facility: HOSPITAL | Age: 68
End: 2023-04-11
Payer: MEDICARE

## 2023-04-11 NOTE — TELEPHONE ENCOUNTER
Called pt, no answer, left voicemail that prescription for lyumjev was faxed to [facility] this afternoon.

## 2023-04-17 RX ORDER — CELECOXIB 100 MG/1
CAPSULE ORAL
Qty: 60 CAPSULE | Refills: 1 | Status: SHIPPED | OUTPATIENT
Start: 2023-04-17

## 2023-04-24 RX ORDER — ESCITALOPRAM OXALATE 20 MG/1
TABLET ORAL
Qty: 90 TABLET | Refills: 1 | Status: SHIPPED | OUTPATIENT
Start: 2023-04-24

## 2023-04-25 ENCOUNTER — TELEPHONE (OUTPATIENT)
Dept: FAMILY MEDICINE CLINIC | Facility: CLINIC | Age: 68
End: 2023-04-25
Payer: MEDICARE

## 2023-04-25 ENCOUNTER — TELEPHONE (OUTPATIENT)
Dept: DIABETES SERVICES | Facility: HOSPITAL | Age: 68
End: 2023-04-25
Payer: MEDICARE

## 2023-04-25 NOTE — TELEPHONE ENCOUNTER
Patient has back surgery currently on hold due to A1c being too high (9.9 as of Monday) and wants advice on how to get the value down. He feels like he has control of the sugars better (reporting keepin gthem between 125-180 roughly) but is requesting a call back to get guidance to meet surgery requirements as quickly as possible.

## 2023-04-25 NOTE — TELEPHONE ENCOUNTER
Left the patient a vm to call the office. Advised apt was rescheduled to 05/2/23 at 11:15am. That I will call him back after we speak to Dr BARBOZA about his case.

## 2023-04-27 ENCOUNTER — TELEPHONE (OUTPATIENT)
Dept: FAMILY MEDICINE CLINIC | Facility: CLINIC | Age: 68
End: 2023-04-27
Payer: MEDICARE

## 2023-04-27 NOTE — TELEPHONE ENCOUNTER
The patient has called and is concerned because his A1c is still elevated and he is scheduled to have surgery and they would like to have his A1c under better control before doing the surgery.  He has increase his Basaglar to a total daily dose of 75 units.  He is also using the Lyumjev that was prescribed at the last appointment as well as the previously prescribed glimepiride and Trulicity.  The patient was instructed to increase his Basaglar to 80 units by taking 40 units twice a day.  If his glucose levels remain consistently above 160 he can increase by 5 additional units on each dose as needed.

## 2023-04-27 NOTE — TELEPHONE ENCOUNTER
Called and left voicemail for [employer] to send us new papers with shubham santizo name on it for surgery clearance. He has an apt on 5/08/2023.

## 2023-04-28 ENCOUNTER — TELEPHONE (OUTPATIENT)
Dept: FAMILY MEDICINE CLINIC | Facility: CLINIC | Age: 68
End: 2023-04-28
Payer: MEDICARE

## 2023-04-28 DIAGNOSIS — E11.43 TYPE 2 DIABETES MELLITUS WITH DIABETIC AUTONOMIC NEUROPATHY, WITHOUT LONG-TERM CURRENT USE OF INSULIN: Primary | Chronic | ICD-10-CM

## 2023-05-04 ENCOUNTER — TELEPHONE (OUTPATIENT)
Dept: FAMILY MEDICINE CLINIC | Facility: CLINIC | Age: 68
End: 2023-05-04
Payer: MEDICARE

## 2023-05-04 NOTE — TELEPHONE ENCOUNTER
Patient already has active orders in his chart.  Left message for patient that he can come in at his convenience to get labs drawn. I will send message on my chart as well.  Patient requested text message.

## 2023-05-04 NOTE — TELEPHONE ENCOUNTER
"  Caller: Chaparro Weber \"Lane\"    Relationship: Self    Best call back number: [phone]    What orders are you requesting (i.e. lab or imaging): LAB ORDER FOR APPOINTMENT ON 05/08    In what timeframe would the patient need to come in: 05/05    Where will you receive your lab/imaging services: [facility]    Additional notes: PATIENT WOULD LIKE TEXT CONFIRMATION THAT ORDERS ARE PUT IN          "

## 2023-05-08 ENCOUNTER — OFFICE VISIT (OUTPATIENT)
Dept: FAMILY MEDICINE CLINIC | Facility: CLINIC | Age: 68
End: 2023-05-08
Payer: MEDICARE

## 2023-05-08 ENCOUNTER — LAB (OUTPATIENT)
Dept: LAB | Facility: HOSPITAL | Age: 68
End: 2023-05-08
Payer: MEDICARE

## 2023-05-08 VITALS
OXYGEN SATURATION: 91 % | BODY MASS INDEX: 34.33 KG/M2 | RESPIRATION RATE: 18 BRPM | TEMPERATURE: 97.8 F | WEIGHT: 267.5 LBS | SYSTOLIC BLOOD PRESSURE: 88 MMHG | HEIGHT: 74 IN | HEART RATE: 83 BPM | DIASTOLIC BLOOD PRESSURE: 70 MMHG

## 2023-05-08 DIAGNOSIS — E78.2 MIXED HYPERLIPIDEMIA: Chronic | ICD-10-CM

## 2023-05-08 DIAGNOSIS — R79.89 ABNORMAL CBC: Primary | ICD-10-CM

## 2023-05-08 DIAGNOSIS — D69.6 THROMBOCYTOPENIA: ICD-10-CM

## 2023-05-08 DIAGNOSIS — E66.09 CLASS 1 OBESITY DUE TO EXCESS CALORIES WITH SERIOUS COMORBIDITY AND BODY MASS INDEX (BMI) OF 34.0 TO 34.9 IN ADULT: ICD-10-CM

## 2023-05-08 DIAGNOSIS — I10 ESSENTIAL HYPERTENSION: Chronic | ICD-10-CM

## 2023-05-08 DIAGNOSIS — E11.43 TYPE 2 DIABETES MELLITUS WITH DIABETIC AUTONOMIC NEUROPATHY, WITHOUT LONG-TERM CURRENT USE OF INSULIN: Chronic | ICD-10-CM

## 2023-05-08 DIAGNOSIS — E11.43 TYPE 2 DIABETES MELLITUS WITH DIABETIC AUTONOMIC NEUROPATHY, WITHOUT LONG-TERM CURRENT USE OF INSULIN: ICD-10-CM

## 2023-05-08 DIAGNOSIS — R79.89 ABNORMAL CBC: ICD-10-CM

## 2023-05-08 PROBLEM — E66.811 CLASS 1 OBESITY DUE TO EXCESS CALORIES WITH SERIOUS COMORBIDITY AND BODY MASS INDEX (BMI) OF 33.0 TO 33.9 IN ADULT: Chronic | Status: RESOLVED | Noted: 2021-10-25 | Resolved: 2023-05-08

## 2023-05-08 LAB
ALBUMIN SERPL-MCNC: 4.5 G/DL (ref 3.5–5.2)
ALBUMIN/GLOB SERPL: 1.9 G/DL
ALP SERPL-CCNC: 45 U/L (ref 39–117)
ALT SERPL W P-5'-P-CCNC: 37 U/L (ref 1–41)
ANION GAP SERPL CALCULATED.3IONS-SCNC: 6 MMOL/L (ref 5–15)
AST SERPL-CCNC: 42 U/L (ref 1–40)
BASOPHILS # BLD AUTO: 0.03 10*3/MM3 (ref 0–0.2)
BASOPHILS NFR BLD AUTO: 0.7 % (ref 0–1.5)
BILIRUB SERPL-MCNC: 0.4 MG/DL (ref 0–1.2)
BUN SERPL-MCNC: 25 MG/DL (ref 8–23)
BUN/CREAT SERPL: 24.3 (ref 7–25)
CALCIUM SPEC-SCNC: 8.9 MG/DL (ref 8.6–10.5)
CHLORIDE SERPL-SCNC: 101 MMOL/L (ref 98–107)
CHOLEST SERPL-MCNC: 153 MG/DL (ref 0–200)
CO2 SERPL-SCNC: 31 MMOL/L (ref 22–29)
CREAT SERPL-MCNC: 1.03 MG/DL (ref 0.76–1.27)
DEPRECATED RDW RBC AUTO: 40.5 FL (ref 37–54)
EGFRCR SERPLBLD CKD-EPI 2021: 80.6 ML/MIN/1.73
EOSINOPHIL # BLD AUTO: 0.07 10*3/MM3 (ref 0–0.4)
EOSINOPHIL NFR BLD AUTO: 1.7 % (ref 0.3–6.2)
ERYTHROCYTE [DISTWIDTH] IN BLOOD BY AUTOMATED COUNT: 12.9 % (ref 12.3–15.4)
GLOBULIN UR ELPH-MCNC: 2.4 GM/DL
GLUCOSE SERPL-MCNC: 166 MG/DL (ref 65–99)
HBA1C MFR BLD: 9.5 % (ref 4.8–5.6)
HCT VFR BLD AUTO: 43.6 % (ref 37.5–51)
HDLC SERPL-MCNC: 38 MG/DL (ref 40–60)
HGB BLD-MCNC: 14.5 G/DL (ref 13–17.7)
IMM GRANULOCYTES # BLD AUTO: 0.01 10*3/MM3 (ref 0–0.05)
IMM GRANULOCYTES NFR BLD AUTO: 0.2 % (ref 0–0.5)
LDLC SERPL CALC-MCNC: 82 MG/DL (ref 0–100)
LDLC/HDLC SERPL: 1.99 {RATIO}
LYMPHOCYTES # BLD AUTO: 1.62 10*3/MM3 (ref 0.7–3.1)
LYMPHOCYTES NFR BLD AUTO: 38.8 % (ref 19.6–45.3)
MCH RBC QN AUTO: 28.9 PG (ref 26.6–33)
MCHC RBC AUTO-ENTMCNC: 33.3 G/DL (ref 31.5–35.7)
MCV RBC AUTO: 86.9 FL (ref 79–97)
MONOCYTES # BLD AUTO: 0.39 10*3/MM3 (ref 0.1–0.9)
MONOCYTES NFR BLD AUTO: 9.3 % (ref 5–12)
NEUTROPHILS NFR BLD AUTO: 2.06 10*3/MM3 (ref 1.7–7)
NEUTROPHILS NFR BLD AUTO: 49.3 % (ref 42.7–76)
NRBC BLD AUTO-RTO: 0 /100 WBC (ref 0–0.2)
PATHOLOGY REVIEW: YES
PLATELET # BLD AUTO: 99 10*3/MM3 (ref 140–450)
PMV BLD AUTO: 10.8 FL (ref 6–12)
POTASSIUM SERPL-SCNC: 4.5 MMOL/L (ref 3.5–5.2)
PROT SERPL-MCNC: 6.9 G/DL (ref 6–8.5)
RBC # BLD AUTO: 5.02 10*6/MM3 (ref 4.14–5.8)
RETICS # AUTO: 0.09 10*6/MM3 (ref 0.02–0.13)
RETICS/RBC NFR AUTO: 1.79 % (ref 0.7–1.9)
SODIUM SERPL-SCNC: 138 MMOL/L (ref 136–145)
TRIGL SERPL-MCNC: 197 MG/DL (ref 0–150)
VLDLC SERPL-MCNC: 33 MG/DL (ref 5–40)
WBC NRBC COR # BLD: 4.18 10*3/MM3 (ref 3.4–10.8)

## 2023-05-08 PROCEDURE — 80053 COMPREHEN METABOLIC PANEL: CPT

## 2023-05-08 PROCEDURE — 85045 AUTOMATED RETICULOCYTE COUNT: CPT

## 2023-05-08 PROCEDURE — 80061 LIPID PANEL: CPT

## 2023-05-08 PROCEDURE — 83036 HEMOGLOBIN GLYCOSYLATED A1C: CPT

## 2023-05-08 PROCEDURE — 36415 COLL VENOUS BLD VENIPUNCTURE: CPT

## 2023-05-08 PROCEDURE — 85025 COMPLETE CBC W/AUTO DIFF WBC: CPT

## 2023-05-08 NOTE — PROGRESS NOTES
"Chief Complaint  Diabetes and discuss  back surgery     Subjective        Chaparro Weber presents to [facility]  History of Present Illness  He is here today for the management of his chronic medical conditions. He has asthma, COPD, congestive heart failure, chronic back pain managed by pain management, CAD with pace maker, diabetes, hypertension, very low testosterone levels, hyperlipidemia, kidney stones.  He has a family history of coronary disease and colon cancer and his dad  at 74.  He is a previous smoker and smoked for approximately 12 years.  He denies alcohol use.     He hurt his knee and has not been playing pickleball lately.      The patient recently had a consult with neurosurgery and is needing laminectomy due to radiculopathy of his lower extremities.  His A1c was over 10 and his platelets were 88 and so surgery was postponed.     The patient has no complaints today and denies chest pain, shortness of breath, weakness, numbness, nausea, vomiting, diarrhea, dizziness or syncopal event.      Objective   Vital Signs:  BP (!) 88/70   Pulse 83   Temp 97.8 °F (36.6 °C)   Resp 18   Ht 188 cm (74.02\")   Wt 121 kg (267 lb 8 oz)   SpO2 91%   BMI 34.33 kg/m²   Estimated body mass index is 34.33 kg/m² as calculated from the following:    Height as of this encounter: 188 cm (74.02\").    Weight as of this encounter: 121 kg (267 lb 8 oz).             Physical Exam  Vitals reviewed.   Constitutional:       Appearance: Normal appearance. He is well-developed. He is obese.   HENT:      Head: Normocephalic and atraumatic.      Right Ear: External ear normal.      Left Ear: External ear normal.      Mouth/Throat:      Pharynx: No oropharyngeal exudate.   Eyes:      Conjunctiva/sclera: Conjunctivae normal.      Pupils: Pupils are equal, round, and reactive to light.   Neck:      Vascular: No carotid bruit.   Cardiovascular:      Rate and Rhythm: Normal rate and regular " rhythm.      Heart sounds: No murmur heard.    No friction rub. No gallop.   Pulmonary:      Effort: Pulmonary effort is normal.      Breath sounds: Normal breath sounds. No wheezing or rhonchi.   Abdominal:      General: There is no distension.   Skin:     General: Skin is warm and dry.   Neurological:      Mental Status: He is alert and oriented to person, place, and time.      Cranial Nerves: No cranial nerve deficit.      Motor: No weakness.   Psychiatric:         Mood and Affect: Mood and affect normal.         Behavior: Behavior normal.         Thought Content: Thought content normal.         Judgment: Judgment normal.        Result Review :    CMP        7/29/2022    13:35 1/5/2023    06:55 3/27/2023    08:45   CMP   Glucose 361   285   268     BUN 23   25   24     Creatinine 1.10   0.96   1.03     EGFR 75.0   87.7   80.6     Sodium 136   136   139     Potassium 4.5   4.7   4.3     Chloride 99   99   95     Calcium 9.3   9.2   9.8     Total Protein 6.8    6.9     Albumin 4.40    4.4     Globulin 2.4    2.5     Total Bilirubin 0.2    0.5     Alkaline Phosphatase 52    43     AST (SGOT) 49    31     ALT (SGPT) 56    44     Albumin/Globulin Ratio 1.8    1.8     BUN/Creatinine Ratio 20.9   26.0   23.3     Anion Gap 11.0   7.9   11.0       CBC        7/29/2022    13:35 3/27/2023    08:45 3/27/2023    10:42 5/8/2023    09:03   CBC   WBC 4.55   3.05   4.11      4.18     RBC 4.52   4.98   5.02      5.02     Hemoglobin 13.5   14.5   14.5      14.5     Hematocrit 40.7   43.6   45.2      43.6     MCV 90.0   87.6   90.0      86.9     MCH 29.9   29.1   28.9      28.9     MCHC 33.2   33.3   32.1      33.3     RDW 13.1   12.2   12.2      12.9     Platelets 103   90   111      99         This result is from an external source.     Lipid Panel        7/29/2022    13:35 3/27/2023    08:45   Lipid Panel   Total Cholesterol 171   205     Triglycerides 817   340     HDL Cholesterol 22   31     VLDL Cholesterol  59     LDL  Cholesterol  31   115     LDL/HDL Ratio  3.42       TSH        7/29/2022    13:35 3/27/2023    08:45   TSH   TSH 1.730   2.110                    Assessment and Plan   Diagnoses and all orders for this visit:    1. Abnormal CBC (Primary)  -     Reticulocytes; Future    2. Thrombocytopenia  Comments:  The patient's platelets are trending downwards.  A repeat CBC and peripheral blood smear ordered today to be managed according to findings.  Orders:  -     Peripheral Blood Smear; Future    3. Essential hypertension  Assessment & Plan:  The patient's blood pressure is low today.  I am concerned that this could cause some endorgan dysfunction and so he was told to hold his valsartan for the next 2 to 3 days and come back for blood pressure check.  He will be managed at that point according to findings.      4. Class 1 obesity due to excess calories with serious comorbidity and body mass index (BMI) of 34.0 to 34.9 in adult  Assessment & Plan:  Patient's (Body mass index is 34.33 kg/m².) indicates that they are obese (BMI >30) with health conditions that include hypertension . Weight is unchanged. BMI  is above average; BMI management plan is completed. We discussed low calorie, low carb based diet program, portion control and increasing exercise.       5. Type 2 diabetes mellitus with diabetic autonomic neuropathy, without long-term current use of insulin  Assessment & Plan:  Diabetes is improving with treatment.   Continue current treatment regimen.  Diabetes will be reassessed in 3 months.             Follow Up   Return in about 2 weeks (around 5/22/2023).  Patient was given instructions and counseling regarding his condition or for health maintenance advice. Please see specific information pulled into the AVS if appropriate.

## 2023-05-08 NOTE — ASSESSMENT & PLAN NOTE
Patient's (Body mass index is 34.33 kg/m².) indicates that they are obese (BMI >30) with health conditions that include hypertension . Weight is unchanged. BMI  is above average; BMI management plan is completed. We discussed low calorie, low carb based diet program, portion control and increasing exercise.

## 2023-05-08 NOTE — ASSESSMENT & PLAN NOTE
The patient's blood pressure is low today.  I am concerned that this could cause some endorgan dysfunction and so he was told to hold his valsartan for the next 2 to 3 days and come back for blood pressure check.  He will be managed at that point according to findings.

## 2023-05-09 LAB
LAB AP CASE REPORT: NORMAL
PATH REPORT.FINAL DX SPEC: NORMAL
PATH REPORT.GROSS SPEC: NORMAL

## 2023-05-09 NOTE — PROGRESS NOTES
Chief Complaint: Benign Prostatic Hypertrophy    Subjective         History of Present Illness  Chaparro Weber is a 67 y.o. male presents to [facility] to be seen for BPH.    Patient was previously seen by Dr. Mon on 1/11/2023 for follow-up of kidney stones.  He is to have a KUB next January for follow-up of kidney stones.  He was also seen and treated for BPH with nocturia.  His Flomax was increased to 2 capsules/day and he was advised to follow-up in 3 months to see if this has improved his symptoms. He reports his symptoms have improved dramatically since increasing Flomax. He reports he is having 0-1 episodes of nocturia.     PSA  7/29/2022 less than 0.014  7/19/2021 less than 0.014  9/18/2020 less than 0.01    Previous 1/11/2023:  The patient presents for a follow-up from bilateral ureteroscopy with laser on the right and stone extraction on the left. He had bilateral ureteral stents in place.     The patient reports he is doing well. He had kidney stones approximately 8 to 10 years ago. He drinks a lot of water. He states he plays pickleball. He reports his back has been bothering him. He had a little pain yesterday and this morning. He states he is feeling good. Both of his stents are out. He was working on his prostate and it was really enlarged. The medicine he was put on did not seem to work. He stopped taking it because of the stones. He has had a lot of prostate issues for a while.       Objective     Past Medical History:   Diagnosis Date   • Anxiety    • Arthritis    • Asthma     - PT DENIED ASTHMA- NO INHALERS   • Bradycardia     DEFBIRLLATOR/ PACEMAKER- MEDITRONIC- SEE'S DR. HAMEED DENIED CP/SOB   • Chest pain     NO RECENT CP   • CHF (congestive heart failure)     ASYMPTOMATIC   • Condition not found     Hernia-Unspecified   • Coronary artery disease    • Deafness    • Diabetes     BG RUNS AROUND 250-300 IN AM   • HBP (high blood pressure)    • Heart disease    • High  cholesterol    • Kidney stones    • Lung disease    • Night sweats    • Ringing in ear    • Sleep apnea    • Type 2 diabetes mellitus with autonomic neuropathy 07/16/2021       Past Surgical History:   Procedure Laterality Date   • APPENDECTOMY     • BACK SURGERY  2000,2010    lumbar with hardware   • CARDIAC DEFIBRILLATOR PLACEMENT      MEDITRONIC   • CHOLECYSTECTOMY     • COLONOSCOPY N/A 06/22/2022    Procedure: COLONOSCOPY;  Surgeon: Nicholas Townsend MD;  Location: [facility] ENDOSCOPY;  Service: General;  Laterality: N/A;  DIVERTICULOSIS   • CYSTOSCOPY W/ STONE MANIPULATION Right 1/5/2023    Procedure: CYSTOSCOPY KIDNEY STONE MANIPULATION/EXTRACTION;  Surgeon: Ann Mon MD;  Location: [facility] MAIN OR;  Service: Urology;  Laterality: Right;   • KIDNEY STONE SURGERY     • NECK SURGERY  2005    removal of bone spurs   • PACEMAKER IMPLANTATION      10 YEARS AGO PLACED- MEDITRONIC   • URETEROSCOPY LASER LITHOTRIPSY WITH STENT INSERTION Left 1/5/2023    Procedure: LEFT URETEROSCOPY LASER LITHOTRIPSY WITH STENT INSERTION AND RETROGRADE; RIGHT URETEROSCOPY WITH STENT INSERTION AND RETROGRADE;  Surgeon: Ann Mon MD;  Location: [facility] MAIN OR;  Service: Urology;  Laterality: Left;         Current Outpatient Medications:   •  aspirin  MG tablet, Take 1 tablet by mouth Daily. PT STATED DR MON OFFICE INSTRUCTED TO STOP 5 DAYS PRIOR TO SURGERY, LAST DOSE PT STATED INSTRUCTED TO TAKE IS 12-30-22, Disp: , Rfl:   •  celecoxib (CeleBREX) 100 MG capsule, TAKE ONE CAPSULE BY MOUTH TWICE A DAY AS DIRECTED FOR 30 DAYS., Disp: 60 capsule, Rfl: 1  •  cholecalciferol (VITAMIN D3) 25 MCG (1000 UT) tablet, Take 2 tablets by mouth Daily., Disp: , Rfl:   •  Dulaglutide 3 MG/0.5ML solution pen-injector, Inject 3 mg under the skin into the appropriate area as directed Every 7 (Seven) Days., Disp: , Rfl:   •  escitalopram (LEXAPRO) 20 MG tablet, TAKE ONE TABLET BY MOUTH ONCE DAILY, Disp: 90 tablet, Rfl: 1  •  fenofibrate  (TRICOR) 145 MG tablet, TAKE ONE TABLET BY MOUTH ONCE DAILY, Disp: 90 tablet, Rfl: 1  •  glucose blood test strip, Use as instructed, Disp: 100 each, Rfl: 2  •  glucose blood test strip, OneTouch Ultra Test strips, Disp: , Rfl:   •  HYDROcodone-acetaminophen (NORCO) 7.5-325 MG per tablet, hydrocodone 7.5 mg-acetaminophen 325 mg tablet, Disp: , Rfl:   •  ibuprofen (ADVIL,MOTRIN) 200 MG tablet, Take 1 tablet by mouth Every 6 (Six) Hours As Needed for Mild Pain., Disp: , Rfl:   •  Insulin Glargine, 2 Unit Dial, (Toujeo Max SoloStar) 300 UNIT/ML solution pen-injector injection, Inject 50 Units under the skin into the appropriate area as directed Every Night for 180 days., Disp: 15 mL, Rfl: 1  •  Insulin Lispro-aabc (Lyumjev KwikPen) 200 UNIT/ML solution pen-injector, Inject 25 Units under the skin into the appropriate area as directed 3 (Three) Times a Day Before Meals., Disp: 6 mL, Rfl: 0  •  Insulin Pen Needle (Pen Needles) 32G X 5 MM misc, 1 each Every Night., Disp: 100 each, Rfl: 1  •  methylcellulose, Laxative, (CITRUCEL) 500 MG tablet tablet, , Disp: , Rfl:   •  metoprolol succinate XL (TOPROL-XL) 25 MG 24 hr tablet, Take 1 tablet by mouth Daily., Disp: , Rfl:   •  pregabalin (LYRICA) 300 MG capsule, Take 1 capsule by mouth Daily., Disp: , Rfl:   •  psyllium (Metamucil) 0.52 g capsule, , Disp: , Rfl:   •  rosuvastatin (Crestor) 10 MG tablet, Take 1 tablet by mouth Daily., Disp: 90 tablet, Rfl: 1  •  tamsulosin (FLOMAX) 0.4 MG capsule 24 hr capsule, Take 2 capsules by mouth Daily for 360 days., Disp: 180 capsule, Rfl: 3  •  valsartan (DIOVAN) 160 MG tablet, TAKE ONE TABLET BY MOUTH ONCE DAILY FOR BLOOD PRESSURE, Disp: 90 tablet, Rfl: 0  •  vitamin B-12 (CYANOCOBALAMIN) 1000 MCG tablet, Take 1,200 mcg by mouth Daily., Disp: , Rfl:   •  vitamin E 100 UNIT capsule, Take 1 capsule by mouth Daily., Disp: , Rfl:   •  Zinc 50 MG capsule, Take  by mouth., Disp: , Rfl:     Allergies   Allergen Reactions   • Codeine Nausea  "Only and Nausea And Vomiting        Family History   Problem Relation Age of Onset   • Other Mother 60        Renal Cancer   • Prostate cancer Father    • Colon cancer Father 70   • Malig Hyperthermia Neg Hx        Social History     Socioeconomic History   • Marital status:    • Number of children: 2   Tobacco Use   • Smoking status: Former     Packs/day: 1.50     Years: 40.00     Pack years: 60.00     Types: Cigarettes     Start date: 1965     Quit date:      Years since quittin.4     Passive exposure: Past   • Smokeless tobacco: Never   • Tobacco comments:     no second hand smoke exposure   Vaping Use   • Vaping Use: Never used   Substance and Sexual Activity   • Alcohol use: Never   • Drug use: Never   • Sexual activity: Defer       Vital Signs:   Resp 16   Ht 188 cm (74\")   Wt 116 kg (255 lb 11.7 oz)   BMI 32.83 kg/m²      Physical Exam  Vitals reviewed.   Constitutional:       Appearance: Normal appearance.   Neurological:      General: No focal deficit present.      Mental Status: He is alert and oriented to person, place, and time.   Psychiatric:         Mood and Affect: Mood normal.         Behavior: Behavior normal.          Result Review :   The following data was reviewed by: ANGLE Knox on 05/15/2023:     PSA        2022    13:35   PSA   PSA <0.014       Bladder Scan interpretation 05/15/2023    Estimation of residual urine via BVI 3000 Verathon Bladder Scan  MA/nurse performing: Miriam BARBOZA MA  Residual Urine: 0 ml  Indication: Benign prostatic hyperplasia with nocturia    Kidney stone   Position: Supine  Examination: Incremental scanning of the suprapubic area using 2.0 MHz transducer using copious amounts of acoustic gel.   Findings: An anechoic area was demonstrated which represented the bladder, with measurement of residual urine as noted. I inspected this myself. In that the residual urine was stable or insignificant, refer to plan for treatment and " plan necessary at this time.             Procedures        Assessment and Plan    Diagnoses and all orders for this visit:    1. Benign prostatic hyperplasia with nocturia (Primary)  -     Bladder Scan    2. Kidney stone  -     XR Abdomen KUB; Future    He is doing well on his current medication, so we will continue this.  He is scheduled to follow-up in January regarding his kidney stones with a KUB prior, this was ordered for him and he will go ahead and make his appointment for January.  His primary care does his PSAs.    He was advised to follow-up sooner for any new concerns.      Follow Up   Return in about 33 weeks (around 1/1/2024) for with KUB prior, with PVR.  Patient was given instructions and counseling regarding his condition or for health maintenance advice. Please see specific information pulled into the AVS if appropriate.         This document has been electronically signed by ANGLE Knox  May 15, 2023 09:06 EDT

## 2023-05-10 NOTE — PROGRESS NOTES
Chief Complaint  Diabetes (Follow up, med mgt, cgm eval )    Referred By: DO Zander Guevara presents to [facility] for diabetes medication management    History of Present Illness                                                                                     Visit type:  follow-up  Diabetes type:  Type 2  Current diabetes status/concerns/issues:  He is tolerating his trulicity without difficulty.  He is still awaiting surgical clearance for his back surgery and needing his A1c better for approval.  Other health concerns: back pain  Current Diabetes symptoms:    Polyuria: No   Polydipsia: No   Polyphagia: No   Blurred vision: No   Excessive fatigue: No   Known Diabetes complications:  Neuropathy: Tingling; Location: Feet  Renal: Stage II mild (GFR = 60-89mL/min)  Eyes: None; Location: N/A  Amputation/Wounds: None  GI: None  Cardiovascular: Hypertension, Hyperlipidemia, CHF and Cardiomyopathy  ED: None  Other: None  Hypoglycemia:  Level 1 hypoglycemia (54 mg/dL - 70 mg/dL); Frequency - he reports having some episodes of lows in the 60s  Hypoglycemia Symptoms:  weakness  Current diabetes treatment:  Trulicity 1.5 mg once weekly, Basaglar 40 units twice a day, and Lyumjev insulin before each meal taking 8, 10, or 12 units based on small, medium, or large amounts of carbohydrates plus adding correction for glucose levels greater than 150 mg/dL starting with 2 units and increasing by 1 unit for every 25 mg/dL thereafter, which was added at the last appointment.  He states he is taking 25 units of Lyumjev with each meal  Blood glucose device:  SonoPlot CGM  Blood glucose monitoring frequency:  Continuous per CGM  Blood glucose range/average: The 14-day sensor report shows a glucose average of 181 mg/dL with 50% found in target range and 50% being above target range.  He has some postprandial hyperglycemia mostly in the morning  Glucose Source: Device  Reviewed  Diet:  Limits high carb/sweet foods, Avoids sugary drinks  Activity/Exercise:  he is going to start riding his bike    Past Medical History:   Diagnosis Date   • Anxiety    • Arthritis    • Asthma     - PT DENIED ASTHMA- NO INHALERS   • Bradycardia     DEFBIRLLATOR/ PACEMAKER- MEDITRONIC- SEE'S DR. HAMEED DENIED CP/SOB   • Chest pain     NO RECENT CP   • CHF (congestive heart failure)     ASYMPTOMATIC   • Condition not found     Hernia-Unspecified   • Coronary artery disease    • Deafness    • Diabetes     BG RUNS AROUND 250-300 IN AM   • HBP (high blood pressure)    • Heart disease    • High cholesterol    • Kidney stones    • Lung disease    • Night sweats    • Ringing in ear    • Sleep apnea    • Type 2 diabetes mellitus with autonomic neuropathy 07/16/2021     Past Surgical History:   Procedure Laterality Date   • APPENDECTOMY     • BACK SURGERY  2000,2010    lumbar with hardware   • CARDIAC DEFIBRILLATOR PLACEMENT      MEDITRONIC   • CHOLECYSTECTOMY     • COLONOSCOPY N/A 06/22/2022    Procedure: COLONOSCOPY;  Surgeon: Nicholas Townsend MD;  Location: [facility] ENDOSCOPY;  Service: General;  Laterality: N/A;  DIVERTICULOSIS   • CYSTOSCOPY W/ STONE MANIPULATION Right 1/5/2023    Procedure: CYSTOSCOPY KIDNEY STONE MANIPULATION/EXTRACTION;  Surgeon: Ann Mon MD;  Location: [facility] MAIN OR;  Service: Urology;  Laterality: Right;   • KIDNEY STONE SURGERY     • NECK SURGERY  2005    removal of bone spurs   • PACEMAKER IMPLANTATION      10 YEARS AGO PLACED- MEDITRONIC   • URETEROSCOPY LASER LITHOTRIPSY WITH STENT INSERTION Left 1/5/2023    Procedure: LEFT URETEROSCOPY LASER LITHOTRIPSY WITH STENT INSERTION AND RETROGRADE; RIGHT URETEROSCOPY WITH STENT INSERTION AND RETROGRADE;  Surgeon: Ann Mon MD;  Location: [facility] MAIN OR;  Service: Urology;  Laterality: Left;     Family History   Problem Relation Age of Onset   • Other Mother 60        Renal Cancer   • Prostate cancer Father    • Colon  cancer Father 70   • Malig Hyperthermia Neg Hx      Social History     Socioeconomic History   • Marital status:    • Number of children: 2   Tobacco Use   • Smoking status: Former     Packs/day: 1.50     Years: 40.00     Pack years: 60.00     Types: Cigarettes     Start date: 1965     Quit date:      Years since quittin.3   • Smokeless tobacco: Never   • Tobacco comments:     no second hand smoke exposure   Vaping Use   • Vaping Use: Never used   Substance and Sexual Activity   • Alcohol use: Never   • Drug use: Never   • Sexual activity: Defer     Allergies   Allergen Reactions   • Codeine Nausea Only and Nausea And Vomiting       Current Outpatient Medications:   •  aspirin  MG tablet, Take 1 tablet by mouth Daily. PT STATED DR CHEN OFFICE INSTRUCTED TO STOP 5 DAYS PRIOR TO SURGERY, LAST DOSE PT STATED INSTRUCTED TO TAKE IS 22, Disp: , Rfl:   •  celecoxib (CeleBREX) 100 MG capsule, TAKE ONE CAPSULE BY MOUTH TWICE A DAY AS DIRECTED FOR 30 DAYS., Disp: 60 capsule, Rfl: 1  •  cholecalciferol (VITAMIN D3) 25 MCG (1000 UT) tablet, Take 2 tablets by mouth Daily., Disp: , Rfl:   •  Dulaglutide 3 MG/0.5ML solution pen-injector, Inject 3 mg under the skin into the appropriate area as directed Every 7 (Seven) Days., Disp: , Rfl:   •  escitalopram (LEXAPRO) 20 MG tablet, TAKE ONE TABLET BY MOUTH ONCE DAILY, Disp: 90 tablet, Rfl: 1  •  fenofibrate (TRICOR) 145 MG tablet, TAKE ONE TABLET BY MOUTH ONCE DAILY, Disp: 90 tablet, Rfl: 1  •  glucose blood test strip, Use as instructed, Disp: 100 each, Rfl: 2  •  glucose blood test strip, OneTouch Ultra Test strips, Disp: , Rfl:   •  HYDROcodone-acetaminophen (NORCO) 7.5-325 MG per tablet, hydrocodone 7.5 mg-acetaminophen 325 mg tablet, Disp: , Rfl:   •  ibuprofen (ADVIL,MOTRIN) 200 MG tablet, Take 1 tablet by mouth Every 6 (Six) Hours As Needed for Mild Pain., Disp: , Rfl:   •  Insulin Glargine, 2 Unit Dial, (Toujeo Max SoloStar) 300 UNIT/ML  "solution pen-injector injection, Inject 50 Units under the skin into the appropriate area as directed Every Night for 180 days., Disp: 15 mL, Rfl: 1  •  Insulin Lispro-aabc (Lyumjev AbeikPen) 200 UNIT/ML solution pen-injector, Inject 25 Units under the skin into the appropriate area as directed 3 (Three) Times a Day Before Meals., Disp: 6 mL, Rfl: 0  •  Insulin Pen Needle (Pen Needles) 32G X 5 MM misc, 1 each Every Night., Disp: 100 each, Rfl: 1  •  methylcellulose, Laxative, (CITRUCEL) 500 MG tablet tablet, , Disp: , Rfl:   •  metoprolol succinate XL (TOPROL-XL) 25 MG 24 hr tablet, Take 1 tablet by mouth Daily., Disp: , Rfl:   •  pregabalin (LYRICA) 300 MG capsule, Take 1 capsule by mouth Daily., Disp: , Rfl:   •  psyllium (Metamucil) 0.52 g capsule, , Disp: , Rfl:   •  rosuvastatin (Crestor) 10 MG tablet, Take 1 tablet by mouth Daily., Disp: 90 tablet, Rfl: 1  •  tamsulosin (FLOMAX) 0.4 MG capsule 24 hr capsule, Take 2 capsules by mouth Daily for 360 days., Disp: 180 capsule, Rfl: 3  •  valsartan (DIOVAN) 160 MG tablet, TAKE ONE TABLET BY MOUTH ONCE DAILY FOR BLOOD PRESSURE, Disp: 90 tablet, Rfl: 0  •  vitamin B-12 (CYANOCOBALAMIN) 1000 MCG tablet, Take 1,200 mcg by mouth Daily., Disp: , Rfl:   •  vitamin E 100 UNIT capsule, Take 1 capsule by mouth Daily., Disp: , Rfl:   •  Zinc 50 MG capsule, Take  by mouth., Disp: , Rfl:     Objective     Vitals:    05/11/23 0715   BP: 113/73   BP Location: Right arm   Patient Position: Sitting   Cuff Size: Adult   Pulse: 83   Temp: 97.4 °F (36.3 °C)   SpO2: 96%   Weight: 116 kg (256 lb)   Height: 188 cm (74\")   PainSc:   4     Body mass index is 32.87 kg/m².    Physical Exam  Constitutional:       Appearance: Normal appearance. He is obese.      Comments: Obesity (BMI 30 - 39.9) Pt Current BMI = 32.87    HENT:      Head: Normocephalic and atraumatic.      Right Ear: External ear normal.      Left Ear: External ear normal.      Nose: Nose normal.   Eyes:      Extraocular " Movements: Extraocular movements intact.      Conjunctiva/sclera: Conjunctivae normal.   Pulmonary:      Effort: Pulmonary effort is normal.   Musculoskeletal:         General: Normal range of motion.      Cervical back: Normal range of motion.   Skin:     General: Skin is warm and dry.   Neurological:      General: No focal deficit present.      Mental Status: He is alert and oriented to person, place, and time. Mental status is at baseline.   Psychiatric:         Mood and Affect: Mood normal.         Behavior: Behavior normal.         Thought Content: Thought content normal.         Judgment: Judgment normal.             Result Review :   The following data was reviewed by: ANGLE Mcdonnell on 05/11/2023:    Most Recent A1C        5/8/2023    09:03   HGBA1C Most Recent   Hemoglobin A1C 9.50         A1C Last 3 Results        3/27/2023    08:45 4/24/2023    10:47 5/8/2023    09:03   HGBA1C Last 3 Results   Hemoglobin A1C 10.30   9.8      9.50         This result is from an external source.     His most recent A1c was collected on 5/8/2023 and was 9.5% indicating uncontrolled type 2 diabetes.  This was down from the prior result of 9.8 collected in April of this year.    Glucose   Date Value Ref Range Status   05/11/2023 168 (H) 70 - 99 mg/dL Final     Comment:     Serial Number: [id number]:  [id number]     Point of care glucose is within normal limits for nonfasting glucose    Creatinine   Date Value Ref Range Status   05/08/2023 1.03 0.76 - 1.27 mg/dL Final   03/27/2023 1.03 0.76 - 1.27 mg/dL Final     eGFR   Date Value Ref Range Status   05/08/2023 80.6 >60.0 mL/min/1.73 Final   03/27/2023 80.6 >60.0 mL/min/1.73 Final     Labs collected on 5/8/2023 show stage II renal disease    Microalbumin, Urine   Date Value Ref Range Status   03/27/2023 <1.2 mg/dL Final   07/29/2022 <1.2 mg/dL Final     Urine microalbumin collected on 3/27/2023 is within normal limits    Total Cholesterol   Date Value Ref Range  Status   05/08/2023 153 0 - 200 mg/dL Final   03/27/2023 205 (H) 0 - 200 mg/dL Final     Triglycerides   Date Value Ref Range Status   05/08/2023 197 (H) 0 - 150 mg/dL Final   03/27/2023 340 (H) 0 - 150 mg/dL Final     HDL Cholesterol   Date Value Ref Range Status   05/08/2023 38 (L) 40 - 60 mg/dL Final   03/27/2023 31 (L) 40 - 60 mg/dL Final     LDL Cholesterol    Date Value Ref Range Status   05/08/2023 82 0 - 100 mg/dL Final   03/27/2023 115 (H) 0 - 100 mg/dL Final     Lipid panel collected on 5/8/2023 shows hypertriglyceridemia with low HDL            Assessment: He has had continued improvement in his A1c but remains above target.  The CGM report indicates some postprandial hyperglycemia with his breakfast meal which may be a anni effect.  He is anxious to get his back surgery completed and needs his A1c more improved for surgical clearance.  He has made dietary adjustments.  He has increased his insulin.  He is limited on activity due to his back issues.  He indicates he is going to try to start riding his bike to see if he can tolerate this for exercise.      Diagnoses and all orders for this visit:    1. Type 2 diabetes mellitus with diabetic neuropathy, with long-term current use of insulin (Primary)    2. Uncontrolled type 2 diabetes mellitus with hyperglycemia  -     Insulin Glargine, 2 Unit Dial, (Toujeo Max SoloStar) 300 UNIT/ML solution pen-injector injection; Inject 50 Units under the skin into the appropriate area as directed Every Night for 180 days.  Dispense: 15 mL; Refill: 1    3. Type 2 diabetes mellitus with stage 2 chronic kidney disease, with long-term current use of insulin    4. Obesity (BMI 30-39.9)    5. Uses self-applied continuous glucose monitoring device    Other orders  -     POC Glucose        Plan: We will increase his Trulicity to 3 mg once weekly we will also have the patient increase his Basaglar to 45 units twice a day for 1 week.  If fasting glucose levels are consistently  above 150 after 1 week then he will increase to 50 units twice a day and remain on this dose.  He was advised to monitor his postprandial glucose levels in the morning and if necessary add an additional 3 to 4 units to the morning dose to prevent the hyperglycemia.    The patient will monitor his blood glucose levels using his continuous glucose sensor.  If he develops problematic hyperglycemia or hypoglycemia or adverse drug reactions, he will contact the office for further instructions.        Follow Up     Return in about 3 months (around 8/11/2023) for Medication Management, CGM Follow-up.    Patient was given instructions and counseling regarding his condition or for health maintenance advice. Please see specific information pulled into the AVS if appropriate.     Suzanna Agosto, APRN  05/11/2023      Dictated Utilizing Dragon Dictation.  Please note that portions of this note were completed with a voice recognition program.  Part of this note may be an electronic transcription/translation of spoken language to printed text using the Dragon Dictation System.

## 2023-05-11 ENCOUNTER — OFFICE VISIT (OUTPATIENT)
Dept: DIABETES SERVICES | Facility: HOSPITAL | Age: 68
End: 2023-05-11
Payer: MEDICARE

## 2023-05-11 VITALS
HEART RATE: 83 BPM | BODY MASS INDEX: 32.85 KG/M2 | OXYGEN SATURATION: 96 % | SYSTOLIC BLOOD PRESSURE: 113 MMHG | DIASTOLIC BLOOD PRESSURE: 73 MMHG | WEIGHT: 256 LBS | TEMPERATURE: 97.4 F | HEIGHT: 74 IN

## 2023-05-11 DIAGNOSIS — E11.22 TYPE 2 DIABETES MELLITUS WITH STAGE 2 CHRONIC KIDNEY DISEASE, WITH LONG-TERM CURRENT USE OF INSULIN: ICD-10-CM

## 2023-05-11 DIAGNOSIS — E66.9 OBESITY (BMI 30-39.9): ICD-10-CM

## 2023-05-11 DIAGNOSIS — Z79.4 TYPE 2 DIABETES MELLITUS WITH DIABETIC NEUROPATHY, WITH LONG-TERM CURRENT USE OF INSULIN: Primary | ICD-10-CM

## 2023-05-11 DIAGNOSIS — E11.40 TYPE 2 DIABETES MELLITUS WITH DIABETIC NEUROPATHY, WITH LONG-TERM CURRENT USE OF INSULIN: Primary | ICD-10-CM

## 2023-05-11 DIAGNOSIS — Z97.8 USES SELF-APPLIED CONTINUOUS GLUCOSE MONITORING DEVICE: ICD-10-CM

## 2023-05-11 DIAGNOSIS — N18.2 TYPE 2 DIABETES MELLITUS WITH STAGE 2 CHRONIC KIDNEY DISEASE, WITH LONG-TERM CURRENT USE OF INSULIN: ICD-10-CM

## 2023-05-11 DIAGNOSIS — E11.65 UNCONTROLLED TYPE 2 DIABETES MELLITUS WITH HYPERGLYCEMIA: ICD-10-CM

## 2023-05-11 DIAGNOSIS — Z79.4 TYPE 2 DIABETES MELLITUS WITH STAGE 2 CHRONIC KIDNEY DISEASE, WITH LONG-TERM CURRENT USE OF INSULIN: ICD-10-CM

## 2023-05-11 LAB — GLUCOSE BLDC GLUCOMTR-MCNC: 168 MG/DL (ref 70–99)

## 2023-05-11 PROCEDURE — 95251 CONT GLUC MNTR ANALYSIS I&R: CPT | Performed by: NURSE PRACTITIONER

## 2023-05-11 PROCEDURE — 82948 REAGENT STRIP/BLOOD GLUCOSE: CPT | Performed by: NURSE PRACTITIONER

## 2023-05-11 PROCEDURE — G0463 HOSPITAL OUTPT CLINIC VISIT: HCPCS | Performed by: NURSE PRACTITIONER

## 2023-05-11 PROCEDURE — 99214 OFFICE O/P EST MOD 30 MIN: CPT | Performed by: NURSE PRACTITIONER

## 2023-05-11 PROCEDURE — 1159F MED LIST DOCD IN RCRD: CPT | Performed by: NURSE PRACTITIONER

## 2023-05-11 PROCEDURE — 3046F HEMOGLOBIN A1C LEVEL >9.0%: CPT | Performed by: NURSE PRACTITIONER

## 2023-05-11 PROCEDURE — 3074F SYST BP LT 130 MM HG: CPT | Performed by: NURSE PRACTITIONER

## 2023-05-11 PROCEDURE — 1160F RVW MEDS BY RX/DR IN RCRD: CPT | Performed by: NURSE PRACTITIONER

## 2023-05-11 PROCEDURE — 3078F DIAST BP <80 MM HG: CPT | Performed by: NURSE PRACTITIONER

## 2023-05-11 RX ORDER — INSULIN GLARGINE 300 U/ML
50 INJECTION, SOLUTION SUBCUTANEOUS NIGHTLY
Qty: 15 ML | Refills: 1
Start: 2023-05-11 | End: 2023-11-07

## 2023-05-11 NOTE — PATIENT INSTRUCTIONS
Increase the Trulicity to 3 mg once weekly.  I will send a prescription to the [facility] for the new strength but in the meantime you can take 2 injections of your 1.5 mg pen on the same day of each week until you get the new strength.    Increase your Basaglar to 45 units twice a day for 1 week.  If your fasting glucose levels are consistently above 150 after 1 week then increase to 50 units twice a day and remain on this dose.    Monitor your after meal glucose especially at breakfast.  If you are still going high above 180 after breakfast increase your breakfast dose of Lyumjev by 3 units or so

## 2023-05-15 ENCOUNTER — OFFICE VISIT (OUTPATIENT)
Dept: UROLOGY | Facility: CLINIC | Age: 68
End: 2023-05-15
Payer: MEDICARE

## 2023-05-15 VITALS — RESPIRATION RATE: 16 BRPM | BODY MASS INDEX: 32.82 KG/M2 | HEIGHT: 74 IN | WEIGHT: 255.73 LBS

## 2023-05-15 DIAGNOSIS — N40.1 BENIGN PROSTATIC HYPERPLASIA WITH NOCTURIA: Primary | ICD-10-CM

## 2023-05-15 DIAGNOSIS — R35.1 BENIGN PROSTATIC HYPERPLASIA WITH NOCTURIA: Primary | ICD-10-CM

## 2023-05-15 DIAGNOSIS — N20.0 KIDNEY STONE: ICD-10-CM

## 2023-05-15 LAB — URINE VOLUME: 0

## 2023-05-15 PROCEDURE — 51798 US URINE CAPACITY MEASURE: CPT | Performed by: NURSE PRACTITIONER

## 2023-05-15 PROCEDURE — 1159F MED LIST DOCD IN RCRD: CPT | Performed by: NURSE PRACTITIONER

## 2023-05-15 PROCEDURE — 99213 OFFICE O/P EST LOW 20 MIN: CPT | Performed by: NURSE PRACTITIONER

## 2023-05-15 PROCEDURE — 1160F RVW MEDS BY RX/DR IN RCRD: CPT | Performed by: NURSE PRACTITIONER

## 2023-05-22 ENCOUNTER — OFFICE VISIT (OUTPATIENT)
Dept: FAMILY MEDICINE CLINIC | Facility: CLINIC | Age: 68
End: 2023-05-22
Payer: MEDICARE

## 2023-05-22 VITALS
SYSTOLIC BLOOD PRESSURE: 114 MMHG | WEIGHT: 261.1 LBS | RESPIRATION RATE: 18 BRPM | HEART RATE: 74 BPM | DIASTOLIC BLOOD PRESSURE: 71 MMHG | TEMPERATURE: 97.2 F | BODY MASS INDEX: 33.51 KG/M2 | HEIGHT: 74 IN | OXYGEN SATURATION: 93 %

## 2023-05-22 DIAGNOSIS — I10 ESSENTIAL HYPERTENSION: Chronic | ICD-10-CM

## 2023-05-22 DIAGNOSIS — E66.09 CLASS 1 OBESITY DUE TO EXCESS CALORIES WITH SERIOUS COMORBIDITY AND BODY MASS INDEX (BMI) OF 33.0 TO 33.9 IN ADULT: Chronic | ICD-10-CM

## 2023-05-22 DIAGNOSIS — Z79.4 TYPE 2 DIABETES MELLITUS WITH DIABETIC AUTONOMIC NEUROPATHY, WITH LONG-TERM CURRENT USE OF INSULIN: Primary | Chronic | ICD-10-CM

## 2023-05-22 DIAGNOSIS — E11.43 TYPE 2 DIABETES MELLITUS WITH DIABETIC AUTONOMIC NEUROPATHY, WITH LONG-TERM CURRENT USE OF INSULIN: Primary | Chronic | ICD-10-CM

## 2023-05-22 PROBLEM — E66.811 CLASS 1 OBESITY DUE TO EXCESS CALORIES WITH SERIOUS COMORBIDITY AND BODY MASS INDEX (BMI) OF 34.0 TO 34.9 IN ADULT: Chronic | Status: RESOLVED | Noted: 2021-10-25 | Resolved: 2023-05-22

## 2023-05-22 NOTE — PROGRESS NOTES
"Chief Complaint  Diabetes     Subjective        Chaparro Weber presents to [facility]  History of Present Illness  He is here today for the management of his chronic medical conditions. He has asthma, COPD, congestive heart failure, chronic back pain managed by pain management, CAD with pace maker, diabetes, hypertension, very low testosterone levels, hyperlipidemia, kidney stones.  He has a family history of coronary disease and colon cancer and his dad  at 74.  He is a previous smoker and smoked for approximately 12 years.  He denies alcohol use.     The patient recently had a consult with neurosurgery and is needing laminectomy due to radiculopathy of his lower extremities.  His A1c was over 10 and his platelets were 88 and so surgery was postponed. He is hoping to have back surgery .     His blood sugars are improving with long acting insulin 45 units bid and 3 mg of trulicty daily. His sugars are running 100 to 150.      The patient has no complaints today and denies chest pain, shortness of breath, weakness, numbness, nausea, vomiting, diarrhea, dizziness or syncopal event.      Objective   Vital Signs:  /71   Pulse 74   Temp 97.2 °F (36.2 °C)   Resp 18   Ht 188 cm (74.02\")   Wt 118 kg (261 lb 1.6 oz)   SpO2 93%   BMI 33.51 kg/m²   Estimated body mass index is 33.51 kg/m² as calculated from the following:    Height as of this encounter: 188 cm (74.02\").    Weight as of this encounter: 118 kg (261 lb 1.6 oz).             Physical Exam  Vitals reviewed.   Constitutional:       Appearance: Normal appearance. He is well-developed. He is obese.   HENT:      Head: Normocephalic and atraumatic.      Right Ear: External ear normal.      Left Ear: External ear normal.      Mouth/Throat:      Pharynx: No oropharyngeal exudate.   Eyes:      Conjunctiva/sclera: Conjunctivae normal.      Pupils: Pupils are equal, round, and reactive to light.   Neck:      " Vascular: No carotid bruit.   Cardiovascular:      Rate and Rhythm: Normal rate and regular rhythm.      Heart sounds: No murmur heard.    No friction rub. No gallop.   Pulmonary:      Effort: Pulmonary effort is normal.      Breath sounds: Normal breath sounds. No wheezing or rhonchi.   Abdominal:      General: There is no distension.   Skin:     General: Skin is warm and dry.   Neurological:      Mental Status: He is alert and oriented to person, place, and time.      Cranial Nerves: No cranial nerve deficit.      Motor: No weakness.   Psychiatric:         Mood and Affect: Mood and affect normal.         Behavior: Behavior normal.         Thought Content: Thought content normal.         Judgment: Judgment normal.        Result Review :    CMP        1/5/2023    06:55 3/27/2023    08:45 5/8/2023    09:03   CMP   Glucose 285   268   166     BUN 25   24   25     Creatinine 0.96   1.03   1.03     EGFR 87.7   80.6   80.6     Sodium 136   139   138     Potassium 4.7   4.3   4.5     Chloride 99   95   101     Calcium 9.2   9.8   8.9     Total Protein  6.9   6.9     Albumin  4.4   4.5     Globulin  2.5   2.4     Total Bilirubin  0.5   0.4     Alkaline Phosphatase  43   45     AST (SGOT)  31   42     ALT (SGPT)  44   37     Albumin/Globulin Ratio  1.8   1.9     BUN/Creatinine Ratio 26.0   23.3   24.3     Anion Gap 7.9   11.0   6.0       CBC        7/29/2022    13:35 3/27/2023    08:45 3/27/2023    10:42 5/8/2023    09:03   CBC   WBC 4.55   3.05   4.11      4.18     RBC 4.52   4.98   5.02      5.02     Hemoglobin 13.5   14.5   14.5      14.5     Hematocrit 40.7   43.6   45.2      43.6     MCV 90.0   87.6   90.0      86.9     MCH 29.9   29.1   28.9      28.9     MCHC 33.2   33.3   32.1      33.3     RDW 13.1   12.2   12.2      12.9     Platelets 103   90   111      99         This result is from an external source.     Lipid Panel        7/29/2022    13:35 3/27/2023    08:45 5/8/2023    09:03   Lipid Panel   Total  Cholesterol 171   205   153     Triglycerides 817   340   197     HDL Cholesterol 22   31   38     VLDL Cholesterol  59   33     LDL Cholesterol  31   115   82     LDL/HDL Ratio  3.42   1.99       TSH        7/29/2022    13:35 3/27/2023    08:45   TSH   TSH 1.730   2.110                    Assessment and Plan   Diagnoses and all orders for this visit:    1. Type 2 diabetes mellitus with diabetic autonomic neuropathy, with long-term current use of insulin (Primary)  Assessment & Plan:  Diabetes is improving with treatment.   Continue current treatment regimen.  Dietary recommendations for ADA diet.  Diabetes will be reassessed in 6 months.      2. Essential hypertension  Assessment & Plan:  Hypertension is improving with treatment.  Continue current treatment regimen.  Dietary sodium restriction.  Weight loss.  Blood pressure will be reassessed at the next regular appointment.      3. Class 1 obesity due to excess calories with serious comorbidity and body mass index (BMI) of 33.0 to 33.9 in adult  Assessment & Plan:  Patient's (Body mass index is 33.51 kg/m².) indicates that they are obese (BMI >30) with health conditions that include hypertension, diabetes mellitus and dyslipidemias . Weight is improving with lifestyle modifications. BMI  is above average; BMI management plan is completed. We discussed low calorie, low carb based diet program, portion control and increasing exercise.              Follow Up   No follow-ups on file.  Patient was given instructions and counseling regarding his condition or for health maintenance advice. Please see specific information pulled into the AVS if appropriate.

## 2023-05-22 NOTE — ASSESSMENT & PLAN NOTE
Patient's (Body mass index is 33.51 kg/m².) indicates that they are obese (BMI >30) with health conditions that include hypertension, diabetes mellitus and dyslipidemias . Weight is improving with lifestyle modifications. BMI  is above average; BMI management plan is completed. We discussed low calorie, low carb based diet program, portion control and increasing exercise.

## 2023-06-06 ENCOUNTER — TELEPHONE (OUTPATIENT)
Dept: FAMILY MEDICINE CLINIC | Facility: CLINIC | Age: 68
End: 2023-06-06
Payer: MEDICARE

## 2023-06-06 DIAGNOSIS — Z79.4 TYPE 2 DIABETES MELLITUS WITH DIABETIC AUTONOMIC NEUROPATHY, WITH LONG-TERM CURRENT USE OF INSULIN: Primary | Chronic | ICD-10-CM

## 2023-06-06 DIAGNOSIS — E11.43 TYPE 2 DIABETES MELLITUS WITH DIABETIC AUTONOMIC NEUROPATHY, WITH LONG-TERM CURRENT USE OF INSULIN: Primary | Chronic | ICD-10-CM

## 2023-06-06 DIAGNOSIS — I10 ESSENTIAL HYPERTENSION: Chronic | ICD-10-CM

## 2023-06-14 ENCOUNTER — LAB (OUTPATIENT)
Dept: LAB | Facility: HOSPITAL | Age: 68
End: 2023-06-14
Payer: MEDICARE

## 2023-06-14 DIAGNOSIS — Z79.4 TYPE 2 DIABETES MELLITUS WITH DIABETIC AUTONOMIC NEUROPATHY, WITH LONG-TERM CURRENT USE OF INSULIN: ICD-10-CM

## 2023-06-14 DIAGNOSIS — I10 ESSENTIAL HYPERTENSION: Chronic | ICD-10-CM

## 2023-06-14 DIAGNOSIS — E11.43 TYPE 2 DIABETES MELLITUS WITH DIABETIC AUTONOMIC NEUROPATHY, WITH LONG-TERM CURRENT USE OF INSULIN: ICD-10-CM

## 2023-06-14 DIAGNOSIS — E78.2 MIXED HYPERLIPIDEMIA: Chronic | ICD-10-CM

## 2023-06-14 LAB
ALBUMIN SERPL-MCNC: 4.1 G/DL (ref 3.5–5.2)
ALBUMIN UR-MCNC: <1.2 MG/DL
ALBUMIN/GLOB SERPL: 1.8 G/DL
ALP SERPL-CCNC: 39 U/L (ref 39–117)
ALT SERPL W P-5'-P-CCNC: 34 U/L (ref 1–41)
ANION GAP SERPL CALCULATED.3IONS-SCNC: 8.4 MMOL/L (ref 5–15)
AST SERPL-CCNC: 33 U/L (ref 1–40)
BASOPHILS # BLD AUTO: 0.02 10*3/MM3 (ref 0–0.2)
BASOPHILS NFR BLD AUTO: 0.6 % (ref 0–1.5)
BILIRUB SERPL-MCNC: 0.3 MG/DL (ref 0–1.2)
BUN SERPL-MCNC: 25 MG/DL (ref 8–23)
BUN/CREAT SERPL: 23.4 (ref 7–25)
CALCIUM SPEC-SCNC: 9.1 MG/DL (ref 8.6–10.5)
CHLORIDE SERPL-SCNC: 103 MMOL/L (ref 98–107)
CO2 SERPL-SCNC: 30.6 MMOL/L (ref 22–29)
CREAT SERPL-MCNC: 1.07 MG/DL (ref 0.76–1.27)
CREAT UR-MCNC: 64.6 MG/DL
DEPRECATED RDW RBC AUTO: 42 FL (ref 37–54)
EGFRCR SERPLBLD CKD-EPI 2021: 77 ML/MIN/1.73
EOSINOPHIL # BLD AUTO: 0.04 10*3/MM3 (ref 0–0.4)
EOSINOPHIL NFR BLD AUTO: 1.2 % (ref 0.3–6.2)
ERYTHROCYTE [DISTWIDTH] IN BLOOD BY AUTOMATED COUNT: 13 % (ref 12.3–15.4)
GLOBULIN UR ELPH-MCNC: 2.3 GM/DL
GLUCOSE SERPL-MCNC: 136 MG/DL (ref 65–99)
HBA1C MFR BLD: 8 % (ref 4.8–5.6)
HCT VFR BLD AUTO: 41.6 % (ref 37.5–51)
HGB BLD-MCNC: 13.5 G/DL (ref 13–17.7)
LYMPHOCYTES # BLD AUTO: 1.43 10*3/MM3 (ref 0.7–3.1)
LYMPHOCYTES NFR BLD AUTO: 41.9 % (ref 19.6–45.3)
MCH RBC QN AUTO: 28.6 PG (ref 26.6–33)
MCHC RBC AUTO-ENTMCNC: 32.5 G/DL (ref 31.5–35.7)
MCV RBC AUTO: 88.1 FL (ref 79–97)
MICROALBUMIN/CREAT UR: NORMAL MG/G{CREAT}
MONOCYTES # BLD AUTO: 0.29 10*3/MM3 (ref 0.1–0.9)
MONOCYTES NFR BLD AUTO: 8.5 % (ref 5–12)
NEUTROPHILS NFR BLD AUTO: 1.63 10*3/MM3 (ref 1.7–7)
NEUTROPHILS NFR BLD AUTO: 47.8 % (ref 42.7–76)
PLATELET # BLD AUTO: 87 10*3/MM3 (ref 140–450)
PMV BLD AUTO: 11.1 FL (ref 6–12)
POTASSIUM SERPL-SCNC: 4.9 MMOL/L (ref 3.5–5.2)
PROT SERPL-MCNC: 6.4 G/DL (ref 6–8.5)
RBC # BLD AUTO: 4.72 10*6/MM3 (ref 4.14–5.8)
SODIUM SERPL-SCNC: 142 MMOL/L (ref 136–145)
WBC NRBC COR # BLD: 3.41 10*3/MM3 (ref 3.4–10.8)

## 2023-06-14 PROCEDURE — 85025 COMPLETE CBC W/AUTO DIFF WBC: CPT

## 2023-06-14 PROCEDURE — 82570 ASSAY OF URINE CREATININE: CPT

## 2023-06-14 PROCEDURE — 80053 COMPREHEN METABOLIC PANEL: CPT

## 2023-06-14 PROCEDURE — 83036 HEMOGLOBIN GLYCOSYLATED A1C: CPT

## 2023-06-14 PROCEDURE — 36415 COLL VENOUS BLD VENIPUNCTURE: CPT

## 2023-06-14 PROCEDURE — 82043 UR ALBUMIN QUANTITATIVE: CPT

## 2023-06-28 ENCOUNTER — HOSPITAL ENCOUNTER (OUTPATIENT)
Dept: OTHER | Facility: HOSPITAL | Age: 68
Discharge: HOME | End: 2023-06-28

## 2023-07-03 ENCOUNTER — TELEPHONE (OUTPATIENT)
Dept: FAMILY MEDICINE CLINIC | Facility: CLINIC | Age: 68
End: 2023-07-03

## 2023-07-03 NOTE — TELEPHONE ENCOUNTER
Caller: NELLI FARIA    Relationship: Emergency Contact    Best call back number: [phone]    Equipment requested: IN HOME OXYGEN     Reason for the request: PATIENT WAS PUT ON OXYGEN WHILE IN THE HOSPITAL AND NOW HE IS AT HOME HE DOES STILL NEED IT     Prescribing Provider: DR. COLE     Additional information or concerns: PATIENT IS EXPERIENCING SHORTNESS OF BREATH AND NEEDS THIS AS SOON AS POSSIBLE   PLEASE NOTIFY WHEN ORDER HAS BEEN SENT

## 2023-07-03 NOTE — TELEPHONE ENCOUNTER
Patient is requesting in home oxygen orders.  He was put on this when discharged from the hospital. I do not see recent hospital visit within our system.  Patient was las seen 5/22/23.  Upcoming appt 8/21/23.

## 2023-07-14 NOTE — TELEPHONE ENCOUNTER
Called to let know Sarahi doesn't do walk test, he said he thinks he is over that now and doesn't think he will need to do one at this time. Stated he is doing better than he was.

## 2023-08-16 ENCOUNTER — TELEPHONE (OUTPATIENT)
Dept: FAMILY MEDICINE CLINIC | Facility: CLINIC | Age: 68
End: 2023-08-16
Payer: MEDICARE

## 2023-08-16 DIAGNOSIS — Z79.4 TYPE 2 DIABETES MELLITUS WITH DIABETIC AUTONOMIC NEUROPATHY, WITH LONG-TERM CURRENT USE OF INSULIN: Chronic | ICD-10-CM

## 2023-08-16 DIAGNOSIS — E11.43 TYPE 2 DIABETES MELLITUS WITH DIABETIC AUTONOMIC NEUROPATHY, WITH LONG-TERM CURRENT USE OF INSULIN: Chronic | ICD-10-CM

## 2023-08-16 DIAGNOSIS — Z12.5 SCREENING FOR PROSTATE CANCER: ICD-10-CM

## 2023-08-16 DIAGNOSIS — E78.2 MIXED HYPERLIPIDEMIA: Primary | Chronic | ICD-10-CM

## 2023-08-16 DIAGNOSIS — Z00.00 ANNUAL PHYSICAL EXAM: ICD-10-CM

## 2023-08-16 DIAGNOSIS — I10 ESSENTIAL HYPERTENSION: Chronic | ICD-10-CM

## 2023-08-16 NOTE — TELEPHONE ENCOUNTER
"  Caller: Chaparro Weber \"Lane\"    Relationship: Self    Best call back number:     [phone]        What is the best time to reach you: ANY    Who are you requesting to speak with (clinical staff, provider,  specific staff member): CLINICAL    Do you know the name of the person who called: PATIENT    What was the call regarding: PATIENT WOULD LIKE TO BE ADVISED IF THE APPOINTMENT SCHEDULED FOR 8.21.2023 SHOULD BE RESCHEDULED TO SEPT DUE TO THE PATIENT LAST COMING IN FOR LABS ON 6.14.2023. PLEASE ADVISE    Is it okay if the provider responds through [employer]: CALL        "

## 2023-08-16 NOTE — TELEPHONE ENCOUNTER
Rescheduled appt for patient, he would like to have labs done prior to appt now on 10/2/23.  Pended some for you, add any additional patient may need.

## 2023-08-16 NOTE — TELEPHONE ENCOUNTER
Patient asking if he should reschedule follow up until sept?  He last had labs on 6/14/23.  Follow up scheduled for 8/21/23.

## 2023-08-25 NOTE — PROGRESS NOTES
Chief Complaint  Diabetes (Follow up, med mgt, A1c eval )    Referred By: DO Zander Guevara presents to [facility] for diabetes medication management    History of Present Illness    Visit type:  follow-up  Diabetes type:  Type 2  Current diabetes status/concerns/issues: He reports glucose levels have been better.  Other health concerns: He underwent a lumbar fusion on 6/27/2023.  He is recovering slowly but well.  He does continue to have quite a bit of back pain.  He was also having some low blood pressure and he had a medication adjustment which has improved the blood pressure readings.  Current Diabetes symptoms:    Polyuria: No   Polydipsia: No   Polyphagia: No   Blurred vision: No   Excessive fatigue: No  Known Diabetes complications:  Neuropathy: Tingling; Location: Feet  Renal: Stage II mild (GFR = 60-89mL/min)  Eyes: None; Location: N/A  Amputation/Wounds: None  GI: None  Cardiovascular: Hypertension, Hyperlipidemia, CHF and Cardiomyopathy  ED: None  Other: None  Hypoglycemia:  Level 1 hypoglycemia (54 mg/dL - 70 mg/dL); Frequency - 1% per CGM  Hypoglycemia Symptoms:  weakness  Current diabetes treatment:  Trulicity 3 mg once weekly, Basaglar 45 units twice a day, and Lyumjev insulin before each meal taking 8, 10, or 12 units based on small, medium, or large amounts of carbohydrates plus adding correction for glucose levels greater than 150 mg/dL starting with 2 units and increasing by 1 unit for every 25 mg/dL thereafter, which was added at the last appointment.  He states he is taking 25 units of Lyumjev with each meal   Blood glucose device:  [employer] CGM  Blood glucose monitoring frequency:  Continuous per CGM  Blood glucose range/average:  The 14-day sensor report shows an average glucose of 176 mg/dL, with 59% in target range ( mgdL), 32% in the high range (181-250 mg/dL), 8% in the very high range (>250 mg/dL), 1% in the low  range (54-70 mg/dL) and 0% in the very low range (<54 mg/dL).   Glucose Source: Device Reviewed  Diet:  Limits high carb/sweet foods, Avoids sugary drinks  Activity/Exercise:   He is doing PT 2-3 times a week    Past Medical History:   Diagnosis Date    Anxiety     Arthritis     Asthma     - PT DENIED ASTHMA- NO INHALERS    Bradycardia     DEFBIRLLATOR/ PACEMAKER- MEDITRONIC- SEE'S DR. HAMEED DENIED CP/SOB    Chest pain     NO RECENT CP    CHF (congestive heart failure)     ASYMPTOMATIC    Condition not found     Hernia-Unspecified    Coronary artery disease     Deafness     Diabetes     BG RUNS AROUND 250-300 IN AM    HBP (high blood pressure)     Heart disease     High cholesterol     Kidney stones     Lung disease     Night sweats     Ringing in ear     Sleep apnea     Type 2 diabetes mellitus with autonomic neuropathy 07/16/2021     Past Surgical History:   Procedure Laterality Date    APPENDECTOMY      BACK SURGERY  2000,2010    lumbar with hardware    CARDIAC DEFIBRILLATOR PLACEMENT      MEDITRONIC    CHOLECYSTECTOMY      COLONOSCOPY N/A 06/22/2022    Procedure: COLONOSCOPY;  Surgeon: Nicholas Townsend MD;  Location: [facility];  Service: General;  Laterality: N/A;  DIVERTICULOSIS    CYSTOSCOPY W/ STONE MANIPULATION Right 1/5/2023    Procedure: CYSTOSCOPY KIDNEY STONE MANIPULATION/EXTRACTION;  Surgeon: Ann Mon MD;  Location: [facility] MAIN OR;  Service: Urology;  Laterality: Right;    KIDNEY STONE SURGERY      NECK SURGERY  2005    removal of bone spurs    PACEMAKER IMPLANTATION      10 YEARS AGO PLACED- MEDITRONIC    URETEROSCOPY LASER LITHOTRIPSY WITH STENT INSERTION Left 1/5/2023    Procedure: LEFT URETEROSCOPY LASER LITHOTRIPSY WITH STENT INSERTION AND RETROGRADE; RIGHT URETEROSCOPY WITH STENT INSERTION AND RETROGRADE;  Surgeon: Ann Mon MD;  Location: [facility] MAIN OR;  Service: Urology;  Laterality: Left;     Family History   Problem Relation Age of Onset    Other Mother 60         Renal Cancer    Prostate cancer Father     Colon cancer Father 70    Malig Hyperthermia Neg Hx      Social History     Socioeconomic History    Marital status:     Number of children: 2   Tobacco Use    Smoking status: Former     Packs/day: 1.50     Years: 40.00     Pack years: 60.00     Types: Cigarettes     Start date: 1965     Quit date:      Years since quittin.6     Passive exposure: Past    Smokeless tobacco: Never    Tobacco comments:     no second hand smoke exposure   Vaping Use    Vaping Use: Never used   Substance and Sexual Activity    Alcohol use: Never    Drug use: Never    Sexual activity: Defer     Allergies   Allergen Reactions    Codeine Nausea Only and Nausea And Vomiting       Current Outpatient Medications:     aspirin  MG tablet, Take 1 tablet by mouth Daily. PT STATED DR CHEN OFFICE INSTRUCTED TO STOP 5 DAYS PRIOR TO SURGERY, LAST DOSE PT STATED INSTRUCTED TO TAKE IS 22, Disp: , Rfl:     celecoxib (CeleBREX) 100 MG capsule, TAKE ONE CAPSULE BY MOUTH TWICE A DAY AS DIRECTED FOR 30 DAYS., Disp: 60 capsule, Rfl: 1    cholecalciferol (VITAMIN D3) 25 MCG (1000 UT) tablet, Take 2 tablets by mouth Daily., Disp: , Rfl:     Dulaglutide (TRULICITY SC), Inject  under the skin into the appropriate area as directed. 3 units weekly, Disp: , Rfl:     Dulaglutide 3 MG/0.5ML solution pen-injector, Inject 0.5 mL under the skin into the appropriate area as directed Every 7 (Seven) Days., Disp: , Rfl:     escitalopram (LEXAPRO) 20 MG tablet, TAKE ONE TABLET BY MOUTH ONCE DAILY, Disp: 90 tablet, Rfl: 1    fenofibrate (TRICOR) 145 MG tablet, TAKE ONE TABLET BY MOUTH ONCE DAILY, Disp: 90 tablet, Rfl: 1    glucose blood test strip, Use as instructed, Disp: 100 each, Rfl: 2    glucose blood test strip, OneTouch Ultra Test strips, Disp: , Rfl:     ibuprofen (ADVIL,MOTRIN) 200 MG tablet, Take 1 tablet by mouth Every 6 (Six) Hours As Needed for Mild Pain., Disp: , Rfl:     Insulin  "Glargine (BASAGLAR KWIKPEN) 100 UNIT/ML injection pen, Inject 90 Units under the skin into the appropriate area as directed Daily for 180 days., Disp: 81 mL, Rfl: 1    Insulin Lispro-aabc (Lyumjev KwikPen) 200 UNIT/ML solution pen-injector, Inject 25 Units under the skin into the appropriate area as directed 3 (Three) Times a Day Before Meals., Disp: 6 mL, Rfl: 0    Insulin Pen Needle (Pen Needles) 32G X 5 MM misc, 1 each Every Night., Disp: 100 each, Rfl: 1    methylcellulose, Laxative, (CITRUCEL) 500 MG tablet tablet, , Disp: , Rfl:     metoprolol succinate XL (TOPROL-XL) 25 MG 24 hr tablet, Take 1 tablet by mouth Daily., Disp: , Rfl:     oxyCODONE-acetaminophen (PERCOCET)  MG per tablet, Take 1 tablet by mouth Every 4 (Four) Hours As Needed., Disp: , Rfl:     pregabalin (LYRICA) 300 MG capsule, Take 1 capsule by mouth Daily., Disp: , Rfl:     psyllium (Metamucil) 0.52 g capsule, , Disp: , Rfl:     tamsulosin (FLOMAX) 0.4 MG capsule 24 hr capsule, Take 2 capsules by mouth Daily for 360 days., Disp: 180 capsule, Rfl: 3    valsartan (DIOVAN) 160 MG tablet, Take 1 tablet by mouth Daily., Disp: 90 tablet, Rfl: 1    vitamin B-12 (CYANOCOBALAMIN) 1000 MCG tablet, Take 1,200 mcg by mouth Daily., Disp: , Rfl:     vitamin E 100 UNIT capsule, Take 1 capsule by mouth Daily., Disp: , Rfl:     Zinc 50 MG capsule, Take  by mouth., Disp: , Rfl:     HYDROcodone-acetaminophen (NORCO) 7.5-325 MG per tablet, hydrocodone 7.5 mg-acetaminophen 325 mg tablet (Patient not taking: Reported on 8/28/2023), Disp: , Rfl:     Objective     Vitals:    08/28/23 0842   BP: 121/79   BP Location: Left arm   Patient Position: Sitting   Cuff Size: Adult   Pulse: 81   Temp: 97.8 øF (36.6 øC)   SpO2: 96%   Weight: 120 kg (264 lb)   Height: 188 cm (74\")   PainSc:   7     Body mass index is 33.9 kg/mý.    Physical Exam  Constitutional:       Appearance: Normal appearance. He is obese.      Comments: Obesity (BMI 30 - 39.9) Pt Current BMI = 33.9  "   HENT:      Head: Normocephalic and atraumatic.      Right Ear: External ear normal.      Left Ear: External ear normal.      Nose: Nose normal.   Eyes:      Extraocular Movements: Extraocular movements intact.      Conjunctiva/sclera: Conjunctivae normal.   Pulmonary:      Effort: Pulmonary effort is normal.   Musculoskeletal:         General: Normal range of motion.      Cervical back: Normal range of motion.   Skin:     General: Skin is warm and dry.   Neurological:      General: No focal deficit present.      Mental Status: He is alert and oriented to person, place, and time. Mental status is at baseline.   Psychiatric:         Mood and Affect: Mood normal.         Behavior: Behavior normal.         Thought Content: Thought content normal.         Judgment: Judgment normal.           Result Review :   The following data was reviewed by: ANGLE Mcdonnell on 08/28/2023:    Most Recent A1C          8/28/2023    08:50   HGBA1C Most Recent   Hemoglobin A1C 7.1        A1C Last 3 Results          5/8/2023    09:03 6/14/2023    09:30 8/28/2023    08:50   HGBA1C Last 3 Results   Hemoglobin A1C 9.50  8.00  7.1      A1c collected in the office today is 7.1%, indicating Uncontrolled Type II diabetes.  This result is down from the prior result of 8.0% collected on 6/14/23     Glucose   Date Value Ref Range Status   08/28/2023 151 (H) 70 - 99 mg/dL Final     Comment:     Serial Number: [id number]:  [id number]     Point of care glucose in the office today is within normal limits for nonfasting glucose    Creatinine   Date Value Ref Range Status   06/14/2023 1.07 0.76 - 1.27 mg/dL Final   05/08/2023 1.03 0.76 - 1.27 mg/dL Final     eGFR   Date Value Ref Range Status   06/14/2023 77.0 >60.0 mL/min/1.73 Final   05/08/2023 80.6 >60.0 mL/min/1.73 Final     Labs collected on 6/14/2023 show Stage II mild (GFR = 60-89mL/min)    Microalbumin, Urine   Date Value Ref Range Status   06/14/2023 <1.2 mg/dL Final   03/27/2023  <1.2 mg/dL Final     Creatinine, Urine   Date Value Ref Range Status   06/14/2023 64.6 mg/dL Final   07/29/2022 50.0 mg/dL Final     Urine microalbuminuria collected on 6/14/2023 is negative for microalbuminuria         Assessment: He is continue to have improvement in his A1c and is now just above target with an A1c of 7.1.  He does have some postprandial hyperglycemia which is most likely the source of the continued elevated A1c.      Diagnoses and all orders for this visit:    1. Uncontrolled type 2 diabetes mellitus with hyperglycemia (Primary)  -     POC Glycosylated Hemoglobin (Hb A1C)    2. Type 2 diabetes mellitus with diabetic neuropathy, with long-term current use of insulin    3. Type 2 diabetes mellitus with stage 2 chronic kidney disease, with long-term current use of insulin    4. Obesity (BMI 30-39.9)    5. Uses self-applied continuous glucose monitoring device    Other orders  -     POC Glucose        Plan: No changes were made to his treatment plan today.  The patient is encouraged to increase the amount of insulin he is using with his breakfast and suppertime meals to prevent postprandial hyperglycemia.    The patient will monitor his blood glucose levels using his continuous glucose sensor.  If he develops problematic hyperglycemia or hypoglycemia or adverse drug reactions, he will contact the office for further instructions.        Follow Up     Return in about 3 months (around 11/28/2023) for Medication Management, CGM Follow-up.    Patient was given instructions and counseling regarding his condition or for health maintenance advice. Please see specific information pulled into the AVS if appropriate.     Suzanna Agosto, APRN  08/28/2023      Dictated Utilizing Dragon Dictation.  Please note that portions of this note were completed with a voice recognition program.  Part of this note may be an electronic transcription/translation of spoken language to printed text using the Dragon  Dictation System.

## 2023-08-28 ENCOUNTER — OFFICE VISIT (OUTPATIENT)
Dept: DIABETES SERVICES | Facility: HOSPITAL | Age: 68
End: 2023-08-28
Payer: MEDICARE

## 2023-08-28 VITALS
BODY MASS INDEX: 33.88 KG/M2 | HEIGHT: 74 IN | TEMPERATURE: 97.8 F | OXYGEN SATURATION: 96 % | SYSTOLIC BLOOD PRESSURE: 121 MMHG | DIASTOLIC BLOOD PRESSURE: 79 MMHG | HEART RATE: 81 BPM | WEIGHT: 264 LBS

## 2023-08-28 DIAGNOSIS — E11.40 TYPE 2 DIABETES MELLITUS WITH DIABETIC NEUROPATHY, WITH LONG-TERM CURRENT USE OF INSULIN: ICD-10-CM

## 2023-08-28 DIAGNOSIS — Z79.4 TYPE 2 DIABETES MELLITUS WITH STAGE 2 CHRONIC KIDNEY DISEASE, WITH LONG-TERM CURRENT USE OF INSULIN: ICD-10-CM

## 2023-08-28 DIAGNOSIS — N18.2 TYPE 2 DIABETES MELLITUS WITH STAGE 2 CHRONIC KIDNEY DISEASE, WITH LONG-TERM CURRENT USE OF INSULIN: ICD-10-CM

## 2023-08-28 DIAGNOSIS — E66.9 OBESITY (BMI 30-39.9): ICD-10-CM

## 2023-08-28 DIAGNOSIS — Z79.4 TYPE 2 DIABETES MELLITUS WITH DIABETIC NEUROPATHY, WITH LONG-TERM CURRENT USE OF INSULIN: ICD-10-CM

## 2023-08-28 DIAGNOSIS — Z97.8 USES SELF-APPLIED CONTINUOUS GLUCOSE MONITORING DEVICE: ICD-10-CM

## 2023-08-28 DIAGNOSIS — E11.65 UNCONTROLLED TYPE 2 DIABETES MELLITUS WITH HYPERGLYCEMIA: Primary | ICD-10-CM

## 2023-08-28 DIAGNOSIS — E11.22 TYPE 2 DIABETES MELLITUS WITH STAGE 2 CHRONIC KIDNEY DISEASE, WITH LONG-TERM CURRENT USE OF INSULIN: ICD-10-CM

## 2023-08-28 LAB
EXPIRATION DATE: ABNORMAL
GLUCOSE BLDC GLUCOMTR-MCNC: 151 MG/DL (ref 70–99)
HBA1C MFR BLD: 7.1 %
Lab: ABNORMAL

## 2023-08-28 PROCEDURE — 82948 REAGENT STRIP/BLOOD GLUCOSE: CPT | Performed by: NURSE PRACTITIONER

## 2023-08-28 PROCEDURE — 99213 OFFICE O/P EST LOW 20 MIN: CPT | Performed by: NURSE PRACTITIONER

## 2023-08-28 PROCEDURE — 3051F HG A1C>EQUAL 7.0%<8.0%: CPT | Performed by: NURSE PRACTITIONER

## 2023-08-28 PROCEDURE — 95251 CONT GLUC MNTR ANALYSIS I&R: CPT | Performed by: NURSE PRACTITIONER

## 2023-08-28 PROCEDURE — 3078F DIAST BP <80 MM HG: CPT | Performed by: NURSE PRACTITIONER

## 2023-08-28 PROCEDURE — 3074F SYST BP LT 130 MM HG: CPT | Performed by: NURSE PRACTITIONER

## 2023-08-28 PROCEDURE — 1159F MED LIST DOCD IN RCRD: CPT | Performed by: NURSE PRACTITIONER

## 2023-08-28 PROCEDURE — G0463 HOSPITAL OUTPT CLINIC VISIT: HCPCS | Performed by: NURSE PRACTITIONER

## 2023-08-28 PROCEDURE — 1160F RVW MEDS BY RX/DR IN RCRD: CPT | Performed by: NURSE PRACTITIONER

## 2023-08-28 RX ORDER — OXYCODONE AND ACETAMINOPHEN 10; 325 MG/1; MG/1
1 TABLET ORAL EVERY 4 HOURS PRN
COMMUNITY
Start: 2023-06-29

## 2023-09-14 RX ORDER — FENOFIBRATE 145 MG/1
TABLET, COATED ORAL
Qty: 90 TABLET | Refills: 1 | Status: SHIPPED | OUTPATIENT
Start: 2023-09-14

## 2023-09-14 RX ORDER — CELECOXIB 100 MG/1
CAPSULE ORAL
Qty: 180 CAPSULE | Refills: 1 | Status: SHIPPED | OUTPATIENT
Start: 2023-09-14

## 2023-09-15 ENCOUNTER — TELEPHONE (OUTPATIENT)
Dept: DIABETES SERVICES | Facility: HOSPITAL | Age: 68
End: 2023-09-15
Payer: MEDICARE

## 2023-09-15 NOTE — TELEPHONE ENCOUNTER
PT CALLED NEEDING TO KNOW WHO SHIPS HIM HIS CECILLE 2 SENSORS AND THEIR PHONE NUMBER TO ORDER MORE. I TOLD HIM IT WAS [employer] AND THEIR NUMBER .[phone]. HE WROTE THE NAME AND NUMBER DOWN AND READ IT BACK TO ME. HE WILL CALL CLINIC BACK WITH ANY OTHER PROBLEMS OR CONCERNS.

## 2023-10-02 ENCOUNTER — OFFICE VISIT (OUTPATIENT)
Dept: FAMILY MEDICINE CLINIC | Facility: CLINIC | Age: 68
End: 2023-10-02
Payer: MEDICARE

## 2023-10-02 ENCOUNTER — LAB (OUTPATIENT)
Dept: LAB | Facility: HOSPITAL | Age: 68
End: 2023-10-02
Payer: MEDICARE

## 2023-10-02 VITALS
BODY MASS INDEX: 33.73 KG/M2 | RESPIRATION RATE: 18 BRPM | HEART RATE: 88 BPM | SYSTOLIC BLOOD PRESSURE: 119 MMHG | WEIGHT: 262.8 LBS | OXYGEN SATURATION: 97 % | TEMPERATURE: 97.5 F | DIASTOLIC BLOOD PRESSURE: 74 MMHG | HEIGHT: 74 IN

## 2023-10-02 DIAGNOSIS — E66.09 CLASS 1 OBESITY DUE TO EXCESS CALORIES WITH SERIOUS COMORBIDITY AND BODY MASS INDEX (BMI) OF 33.0 TO 33.9 IN ADULT: Chronic | ICD-10-CM

## 2023-10-02 DIAGNOSIS — Z00.00 MEDICARE ANNUAL WELLNESS VISIT, SUBSEQUENT: Primary | ICD-10-CM

## 2023-10-02 DIAGNOSIS — I10 ESSENTIAL HYPERTENSION: Chronic | ICD-10-CM

## 2023-10-02 DIAGNOSIS — Z79.4 TYPE 2 DIABETES MELLITUS WITH DIABETIC AUTONOMIC NEUROPATHY, WITH LONG-TERM CURRENT USE OF INSULIN: Chronic | ICD-10-CM

## 2023-10-02 DIAGNOSIS — Z23 NEED FOR INFLUENZA VACCINATION: ICD-10-CM

## 2023-10-02 DIAGNOSIS — Z00.00 ANNUAL PHYSICAL EXAM: ICD-10-CM

## 2023-10-02 DIAGNOSIS — E11.43 TYPE 2 DIABETES MELLITUS WITH DIABETIC AUTONOMIC NEUROPATHY, WITH LONG-TERM CURRENT USE OF INSULIN: Chronic | ICD-10-CM

## 2023-10-02 DIAGNOSIS — E78.2 MIXED HYPERLIPIDEMIA: Chronic | ICD-10-CM

## 2023-10-02 DIAGNOSIS — Z12.5 SCREENING FOR PROSTATE CANCER: ICD-10-CM

## 2023-10-02 DIAGNOSIS — E11.42 DIABETIC PERIPHERAL NEUROPATHY: Chronic | ICD-10-CM

## 2023-10-02 LAB
ALBUMIN SERPL-MCNC: 4.2 G/DL (ref 3.5–5.2)
ALBUMIN/GLOB SERPL: 1.6 G/DL
ALP SERPL-CCNC: 61 U/L (ref 39–117)
ALT SERPL W P-5'-P-CCNC: 43 U/L (ref 1–41)
ANION GAP SERPL CALCULATED.3IONS-SCNC: 8.7 MMOL/L (ref 5–15)
AST SERPL-CCNC: 47 U/L (ref 1–40)
BASOPHILS # BLD AUTO: 0.02 10*3/MM3 (ref 0–0.2)
BASOPHILS NFR BLD AUTO: 0.6 % (ref 0–1.5)
BILIRUB SERPL-MCNC: 0.3 MG/DL (ref 0–1.2)
BUN SERPL-MCNC: 19 MG/DL (ref 8–23)
BUN/CREAT SERPL: 18.4 (ref 7–25)
CALCIUM SPEC-SCNC: 9.1 MG/DL (ref 8.6–10.5)
CHLORIDE SERPL-SCNC: 101 MMOL/L (ref 98–107)
CHOLEST SERPL-MCNC: 127 MG/DL (ref 0–200)
CO2 SERPL-SCNC: 30.3 MMOL/L (ref 22–29)
CREAT SERPL-MCNC: 1.03 MG/DL (ref 0.76–1.27)
DEPRECATED RDW RBC AUTO: 43 FL (ref 37–54)
EGFRCR SERPLBLD CKD-EPI 2021: 80.1 ML/MIN/1.73
EOSINOPHIL # BLD AUTO: 0.09 10*3/MM3 (ref 0–0.4)
EOSINOPHIL NFR BLD AUTO: 2.5 % (ref 0.3–6.2)
ERYTHROCYTE [DISTWIDTH] IN BLOOD BY AUTOMATED COUNT: 13.7 % (ref 12.3–15.4)
GLOBULIN UR ELPH-MCNC: 2.7 GM/DL
GLUCOSE SERPL-MCNC: 139 MG/DL (ref 65–99)
HBA1C MFR BLD: 7.3 % (ref 4.8–5.6)
HCT VFR BLD AUTO: 41.3 % (ref 37.5–51)
HDLC SERPL-MCNC: 25 MG/DL (ref 40–60)
HGB BLD-MCNC: 12.8 G/DL (ref 13–17.7)
IMM GRANULOCYTES # BLD AUTO: 0.01 10*3/MM3 (ref 0–0.05)
IMM GRANULOCYTES NFR BLD AUTO: 0.3 % (ref 0–0.5)
LDLC SERPL CALC-MCNC: 65 MG/DL (ref 0–100)
LDLC/HDLC SERPL: 2.28 {RATIO}
LYMPHOCYTES # BLD AUTO: 1.83 10*3/MM3 (ref 0.7–3.1)
LYMPHOCYTES NFR BLD AUTO: 51.4 % (ref 19.6–45.3)
MCH RBC QN AUTO: 26.7 PG (ref 26.6–33)
MCHC RBC AUTO-ENTMCNC: 31 G/DL (ref 31.5–35.7)
MCV RBC AUTO: 86 FL (ref 79–97)
MONOCYTES # BLD AUTO: 0.37 10*3/MM3 (ref 0.1–0.9)
MONOCYTES NFR BLD AUTO: 10.4 % (ref 5–12)
NEUTROPHILS NFR BLD AUTO: 1.24 10*3/MM3 (ref 1.7–7)
NEUTROPHILS NFR BLD AUTO: 34.8 % (ref 42.7–76)
NRBC BLD AUTO-RTO: 0 /100 WBC (ref 0–0.2)
PLATELET # BLD AUTO: 107 10*3/MM3 (ref 140–450)
PMV BLD AUTO: 10.7 FL (ref 6–12)
POTASSIUM SERPL-SCNC: 4.3 MMOL/L (ref 3.5–5.2)
PROT SERPL-MCNC: 6.9 G/DL (ref 6–8.5)
PSA SERPL-MCNC: <0.014 NG/ML (ref 0–4)
RBC # BLD AUTO: 4.8 10*6/MM3 (ref 4.14–5.8)
SODIUM SERPL-SCNC: 140 MMOL/L (ref 136–145)
TRIGL SERPL-MCNC: 225 MG/DL (ref 0–150)
VLDLC SERPL-MCNC: 37 MG/DL (ref 5–40)
WBC NRBC COR # BLD: 3.56 10*3/MM3 (ref 3.4–10.8)

## 2023-10-02 PROCEDURE — 80061 LIPID PANEL: CPT

## 2023-10-02 PROCEDURE — G0103 PSA SCREENING: HCPCS

## 2023-10-02 PROCEDURE — 36415 COLL VENOUS BLD VENIPUNCTURE: CPT

## 2023-10-02 PROCEDURE — 80053 COMPREHEN METABOLIC PANEL: CPT

## 2023-10-02 PROCEDURE — 83036 HEMOGLOBIN GLYCOSYLATED A1C: CPT

## 2023-10-02 PROCEDURE — 85025 COMPLETE CBC W/AUTO DIFF WBC: CPT

## 2023-10-02 RX ORDER — GLIMEPIRIDE 4 MG/1
TABLET ORAL
COMMUNITY

## 2023-10-02 RX ORDER — ROSUVASTATIN CALCIUM 10 MG/1
TABLET, COATED ORAL
COMMUNITY

## 2023-10-02 NOTE — ASSESSMENT & PLAN NOTE
Patient's (Body mass index is 33.73 kg/m².) indicates that they are obese (BMI >30) with health conditions that include hypertension, diabetes mellitus, and dyslipidemias . Weight is improving with lifestyle modifications. BMI  is above average; BMI management plan is completed. We discussed low calorie, low carb based diet program, portion control, and increasing exercise.

## 2023-10-02 NOTE — PROGRESS NOTES
The ABCs of the Annual Wellness Visit  Initial Medicare Wellness Visit    Subjective     Chaparro Weber is a 68 y.o. male who presents for an Initial Medicare Wellness Visit.    The following portions of the patient's history were reviewed and   updated as appropriate: allergies, current medications, past family history, past medical history, past social history, past surgical history, and problem list.     Compared to one year ago, the patient feels his physical   health is better.    Compared to one year ago, the patient feels his mental   health is the same.    Recent Hospitalizations:  He was not admitted to the hospital during the last year.       Current Medical Providers:  Patient Care Team:  Charlotte Kulkarni DO as PCP - General (Family Medicine)  Daniela Hay APRN as Nurse Practitioner (Nurse Practitioner)  Suzanna Agosto APRN as Nurse Practitioner (Endocrinology)    Outpatient Medications Prior to Visit   Medication Sig Dispense Refill   • aspirin  MG tablet Take 1 tablet by mouth Daily. PT STATED DR CHEN OFFICE INSTRUCTED TO STOP 5 DAYS PRIOR TO SURGERY, LAST DOSE PT STATED INSTRUCTED TO TAKE IS 12-30-22     • celecoxib (CeleBREX) 100 MG capsule TAKE ONE CAPSULE BY MOUTH TWICE A DAY AS DIRECTED FOR 30 DAYS. 180 capsule 1   • cholecalciferol (VITAMIN D3) 25 MCG (1000 UT) tablet Take 2 tablets by mouth Daily.     • Diclofenac Sodium (VOLTAREN) 1 % gel gel APPLY 2 GRAMS TO THE AFFECTED AREA(S) BY TOPICAL ROUTE 4 TIMES PER DAY     • Dulaglutide (TRULICITY SC) Inject  under the skin into the appropriate area as directed. 3 units weekly     • Dulaglutide 3 MG/0.5ML solution pen-injector Inject 0.5 mL under the skin into the appropriate area as directed Every 7 (Seven) Days.     • escitalopram (LEXAPRO) 20 MG tablet TAKE ONE TABLET BY MOUTH ONCE DAILY 90 tablet 1   • fenofibrate (TRICOR) 145 MG tablet TAKE ONE TABLET BY MOUTH ONCE DAILY 90 tablet 1   • glimepiride (AMARYL) 4 MG tablet      •  glucose blood test strip Use as instructed 100 each 2   • glucose blood test strip OneTouch Ultra Test strips     • ibuprofen (ADVIL,MOTRIN) 200 MG tablet Take 1 tablet by mouth Every 6 (Six) Hours As Needed for Mild Pain.     • Insulin Glargine (BASAGLAR KWIKPEN) 100 UNIT/ML injection pen Inject 90 Units under the skin into the appropriate area as directed Daily for 180 days. 81 mL 1   • Insulin Lispro-aabc (Lyumjev KwikPen) 200 UNIT/ML solution pen-injector Inject 25 Units under the skin into the appropriate area as directed 3 (Three) Times a Day Before Meals. 6 mL 0   • Insulin Pen Needle (Pen Needles) 32G X 5 MM misc 1 each Every Night. 100 each 1   • methylcellulose, Laxative, (CITRUCEL) 500 MG tablet tablet      • metoprolol succinate XL (TOPROL-XL) 25 MG 24 hr tablet Take 1 tablet by mouth Daily.     • oxyCODONE-acetaminophen (PERCOCET)  MG per tablet Take 1 tablet by mouth Every 4 (Four) Hours As Needed.     • pregabalin (LYRICA) 300 MG capsule Take 1 capsule by mouth Daily.     • psyllium (Metamucil) 0.52 g capsule      • rosuvastatin (CRESTOR) 10 MG tablet      • tamsulosin (FLOMAX) 0.4 MG capsule 24 hr capsule Take 2 capsules by mouth Daily for 360 days. 180 capsule 3   • valsartan (DIOVAN) 160 MG tablet Take 1 tablet by mouth Daily. 90 tablet 1   • vitamin B-12 (CYANOCOBALAMIN) 1000 MCG tablet Take 1,200 mcg by mouth Daily.     • vitamin E 100 UNIT capsule Take 1 capsule by mouth Daily.     • Zinc 50 MG capsule Take  by mouth.     • HYDROcodone-acetaminophen (NORCO) 7.5-325 MG per tablet hydrocodone 7.5 mg-acetaminophen 325 mg tablet (Patient not taking: Reported on 8/28/2023)       No facility-administered medications prior to visit.       Opioid medication/s are on active medication list.  and I have evaluated his active treatment plan and pain score trends (see table).  Vitals:    10/02/23 0837   PainSc: 0-No pain     I have reviewed the chart for potential of high risk medication and harmful  "drug interactions in the elderly.          Aspirin is on active medication list. Aspirin use is indicated based on review of current medical condition/s. Pros and cons of this therapy have been discussed today. Benefits of this medication outweigh potential harm.  Patient has been encouraged to continue taking this medication.  .      Patient Active Problem List   Diagnosis   • Acquired polycythemia   • Anxiety   • Arthritis   • Asthma   • COPD (chronic obstructive pulmonary disease)   • Diabetic peripheral neuropathy   • NICM (nonischemic cardiomyopathy)   • Left bundle branch block   • Congestive heart failure   • Kidney stone   • Hyperlipidemia   • GERD (gastroesophageal reflux disease)   • Essential hypertension   • Heart disease   • Type 2 diabetes mellitus with autonomic neuropathy   • Class 1 obesity due to excess calories with serious comorbidity and body mass index (BMI) of 33.0 to 33.9 in adult   • Medicare annual wellness visit, subsequent   • Osteoarthritis of knee     Advance Care Planning   Advance Care Planning     Advance Directive is not on file.  ACP discussion was held with the patient during this visit. Patient does not have an advance directive, information provided.       Objective    Vitals:    10/02/23 0837   BP: 119/74   BP Location: Left arm   Patient Position: Sitting   Cuff Size: Adult   Pulse: 88   Resp: 18   Temp: 97.5 °F (36.4 °C)   TempSrc: Tympanic   SpO2: 97%   Weight: 119 kg (262 lb 12.8 oz)   Height: 188 cm (74.02\")   PainSc: 0-No pain     Estimated body mass index is 33.73 kg/m² as calculated from the following:    Height as of this encounter: 188 cm (74.02\").    Weight as of this encounter: 119 kg (262 lb 12.8 oz).           Does the patient have evidence of cognitive impairment?   No    Lab Results   Component Value Date    HGBA1C 7.1 (A) 08/28/2023        HEALTH RISK ASSESSMENT    Smoking Status:  Social History     Tobacco Use   Smoking Status Former   • Packs/day: 1.50   • " Years: 40.00   • Pack years: 60.00   • Types: Cigarettes   • Start date: 1965   • Quit date:    • Years since quittin.7   • Passive exposure: Past   Smokeless Tobacco Never   Tobacco Comments    no second hand smoke exposure     Alcohol Consumption:  Social History     Substance and Sexual Activity   Alcohol Use Never     Fall Risk Screen:    JAZMINE Fall Risk Assessment was completed, and patient is at LOW risk for falls.Assessment completed on:10/2/2023    Depression Screen:       10/2/2023     8:00 AM   PHQ-2/PHQ-9 Depression Screening   Little Interest or Pleasure in Doing Things 0-->not at all   Feeling Down, Depressed or Hopeless 0-->not at all   Trouble Falling or Staying Asleep, or Sleeping Too Much 0-->not at all   Feeling Tired or Having Little Energy 0-->not at all   Poor Appetite or Overeating 0-->not at all   Feeling Bad about Yourself - or that You are a Failure or Have Let Yourself or Your Family Down 0-->not at all   Trouble Concentrating on Things, Such as Reading the Newspaper or Watching Television 0-->not at all   Moving or Speaking So Slowly that Other People Could Have Noticed? Or the Opposite - Being So Fidgety 0-->not at all   Thoughts that You Would be Better Off Dead or of Hurting Yourself in Some Way 0-->not at all   PHQ-9: Brief Depression Severity Measure Score 0   If You Checked Off Any Problems, How Difficult Have These Problems Made It For You to Do Your Work, Take Care of Things at Home, or Get Along with Other People? not difficult at all       Health Habits and Functional and Cognitive Screening:      10/2/2023     8:00 AM   Functional & Cognitive Status   Do you have difficulty preparing food and eating? No   Do you have difficulty bathing yourself, getting dressed or grooming yourself? No   Do you have difficulty using the toilet? No   Do you have difficulty moving around from place to place? No   Do you have trouble with steps or getting out of a bed or a chair? No    Current Diet Unhealthy Diet   Dental Exam Not up to date   Eye Exam Up to date   Exercise (times per week) 0 times per week   Current Exercises Include No Regular Exercise   Do you need help using the phone?  No   Are you deaf or do you have serious difficulty hearing?  No   Do you need help to go to places out of walking distance? No   Do you need help shopping? No   Do you need help preparing meals?  No   Do you need help with housework?  No   Do you need help with laundry? No   Do you need help taking your medications? No   Do you need help managing money? No   Do you ever drive or ride in a car without wearing a seat belt? No   Have you felt unusual stress, anger or loneliness in the last month? No   Who do you live with? Spouse   If you need help, do you have trouble finding someone available to you? No   Have you been bothered in the last four weeks by sexual problems? No   Do you have difficulty concentrating, remembering or making decisions? No       Age-appropriate Screening Schedule:  Refer to the list below for future screening recommendations based on patient's age, sex and/or medical conditions. Orders for these recommended tests are listed in the plan section. The patient has been provided with a written plan.    Health Maintenance   Topic Date Due   • ZOSTER VACCINE (1 of 2) 11/30/2023 (Originally 8/26/2007)   • COVID-19 Vaccine (1) 12/22/2023 (Originally 2/26/1956)   • Pneumococcal Vaccine 65+ (1 - PCV) 03/30/2024 (Originally 8/26/1961)   • HEPATITIS C SCREENING  05/08/2024 (Originally 7/7/2021)   • DIABETIC EYE EXAM  12/08/2023   • HEMOGLOBIN A1C  02/28/2024   • LIPID PANEL  05/08/2024   • URINE MICROALBUMIN  06/14/2024   • ANNUAL WELLNESS VISIT  10/02/2024   • DIABETIC FOOT EXAM  10/02/2024   • BMI FOLLOWUP  10/02/2024   • COLORECTAL CANCER SCREENING  06/22/2025   • TDAP/TD VACCINES (2 - Td or Tdap) 07/16/2029   • INFLUENZA VACCINE  Completed   • AAA SCREEN (ONE-TIME)  Completed          CMS  Preventative Services Quick Reference  Risk Factors Identified During Encounter    None Identified    The above risks/problems have been discussed with the patient.  Pertinent information has been shared with the patient in the After Visit Summary.  An After Visit Summary and PPPS were made available to the patient.  Diagnoses and all orders for this visit:    1. Medicare annual wellness visit, subsequent (Primary)    2. Diabetic peripheral neuropathy  Assessment & Plan:  Diabetes is worsening.   Continue current treatment regimen.  Dietary recommendations for ADA diet.  Diabetes will be reassessed in 6 months.      3. Essential hypertension  Assessment & Plan:  Hypertension is improving with treatment.  Continue current treatment regimen.  Dietary sodium restriction.  Weight loss.  Blood pressure will be reassessed at the next regular appointment.      4. Type 2 diabetes mellitus with diabetic autonomic neuropathy, with long-term current use of insulin  Assessment & Plan:  Diabetes is improving with treatment.   Continue current treatment regimen.  Diabetes will be reassessed in 6 months.      5. Need for influenza vaccination  -     Fluzone High-Dose 65+yrs    6. Class 1 obesity due to excess calories with serious comorbidity and body mass index (BMI) of 33.0 to 33.9 in adult  Assessment & Plan:  Patient's (Body mass index is 33.73 kg/m².) indicates that they are obese (BMI >30) with health conditions that include hypertension, diabetes mellitus, and dyslipidemias . Weight is improving with lifestyle modifications. BMI  is above average; BMI management plan is completed. We discussed low calorie, low carb based diet program, portion control, and increasing exercise.         Follow Up:  Next Medicare Wellness visit to be scheduled in 1 year.        Additional E&M Note during same encounter follows:  Patient has multiple medical problems which are significant and separately identifiable that require additional work  "above and beyond the Medicare Wellness Visit.      Chief Complaint  Medicare Wellness-Initial Visit, Diabetes (DM foot exam ), and Numbness (Has numbness in feet)    Subjective        He had back surgery in June. He is healing well. He is going to PT. Plans on playing pickle ball by Jan 2024. Most recent A1C was 7.0.     Chaparro Weber is also being seen today for diabetic peripheral neuropathy, HTN, DM and obesity.     Review of Systems   HENT:  Negative for trouble swallowing.    Eyes:  Negative for visual disturbance.   Respiratory:  Negative for apnea.    Cardiovascular:  Negative for chest pain.   Gastrointestinal:  Negative for blood in stool.   Endocrine: Negative for polyphagia.   Genitourinary:  Negative for dysuria.   Skin:  Negative for color change.   Allergic/Immunologic: Negative for immunocompromised state.   Neurological:  Positive for numbness. Negative for seizures.   Hematological:  Negative for adenopathy.   Psychiatric/Behavioral:  Negative for behavioral problems.      Objective   Vital Signs:  /74 (BP Location: Left arm, Patient Position: Sitting, Cuff Size: Adult)   Pulse 88   Temp 97.5 °F (36.4 °C) (Tympanic)   Resp 18   Ht 188 cm (74.02\")   Wt 119 kg (262 lb 12.8 oz)   SpO2 97%   BMI 33.73 kg/m²     Physical Exam  Vitals reviewed.   Constitutional:       Appearance: He is well-developed. He is obese.   HENT:      Head: Normocephalic and atraumatic.      Right Ear: External ear normal.      Left Ear: External ear normal.      Mouth/Throat:      Pharynx: No oropharyngeal exudate.   Eyes:      Conjunctiva/sclera: Conjunctivae normal.      Pupils: Pupils are equal, round, and reactive to light.   Neck:      Vascular: No carotid bruit.   Cardiovascular:      Rate and Rhythm: Normal rate and regular rhythm.      Heart sounds: No murmur heard.    No friction rub. No gallop.   Pulmonary:      Effort: Pulmonary effort is normal.      Breath sounds: Normal breath sounds. No wheezing " or rhonchi.   Abdominal:      General: There is no distension.   Skin:     General: Skin is warm and dry.   Neurological:      Mental Status: He is alert and oriented to person, place, and time.      Cranial Nerves: No cranial nerve deficit.      Motor: No weakness.   Psychiatric:         Mood and Affect: Mood and affect normal.         Behavior: Behavior normal.         Thought Content: Thought content normal.         Judgment: Judgment normal.          CMP          5/8/2023    09:03 6/14/2023    09:30 6/27/2023    09:57 6/27/2023    11:02 6/27/2023    12:03 6/27/2023    12:59 6/27/2023    13:57 6/27/2023    15:44   CMP   Glucose 166  136          Glucose   204     203     210     198     191     196       BUN 25  25          Creatinine 1.03  1.07          EGFR 80.6  77.0          Sodium 138  142  136     135     136     137     137     137       Potassium 4.5  4.9  5.0     4.5     4.3     4.0     4.0     4.3       Chloride 101  103          Calcium 8.9  9.1          Total Protein 6.9  6.4          Albumin 4.5  4.1          Globulin 2.4  2.3          Total Bilirubin 0.4  0.3          Alkaline Phosphatase 45  39          AST (SGOT) 42  33          ALT (SGPT) 37  34          Albumin/Globulin Ratio 1.9  1.8          BUN/Creatinine Ratio 24.3  23.4          Anion Gap 6.0  8.4             Details          This result is from an external source.             CBC          6/28/2023    17:51 6/28/2023    20:01 6/29/2023    02:40 6/30/2023    03:22   CBC   WBC 8.30     7.73     8.47     6.02       RBC 3.00     3.07     2.99     2.84       Hemoglobin 8.6     8.9     8.5     8.2       Hematocrit 27.3     26.8     26.3     25.2       MCV 91.0     87.3     88.0     88.7       MCH 28.7     29.0     28.4     28.9       MCHC 31.5     33.2     32.3     32.5       RDW 13.5     13.4     13.4     13.2       Platelets 61     55     54     57          Details          This result is from an external source.             Lipid Panel           3/27/2023    08:45 5/8/2023    09:03   Lipid Panel   Total Cholesterol 205  153    Triglycerides 340  197    HDL Cholesterol 31  38    VLDL Cholesterol 59  33    LDL Cholesterol  115  82    LDL/HDL Ratio 3.42  1.99      TSH          3/27/2023    08:45   TSH   TSH 2.110                 Assessment and Plan   Diagnoses and all orders for this visit:    1. Medicare annual wellness visit, subsequent (Primary)    2. Diabetic peripheral neuropathy  Assessment & Plan:  Diabetes is worsening.   Continue current treatment regimen.  Dietary recommendations for ADA diet.  Diabetes will be reassessed in 6 months.      3. Essential hypertension  Assessment & Plan:  Hypertension is improving with treatment.  Continue current treatment regimen.  Dietary sodium restriction.  Weight loss.  Blood pressure will be reassessed at the next regular appointment.      4. Type 2 diabetes mellitus with diabetic autonomic neuropathy, with long-term current use of insulin  Assessment & Plan:  Diabetes is improving with treatment.   Continue current treatment regimen.  Diabetes will be reassessed in 6 months.      5. Need for influenza vaccination  -     Fluzone High-Dose 65+yrs    6. Class 1 obesity due to excess calories with serious comorbidity and body mass index (BMI) of 33.0 to 33.9 in adult  Assessment & Plan:  Patient's (Body mass index is 33.73 kg/m².) indicates that they are obese (BMI >30) with health conditions that include hypertension, diabetes mellitus, and dyslipidemias . Weight is improving with lifestyle modifications. BMI  is above average; BMI management plan is completed. We discussed low calorie, low carb based diet program, portion control, and increasing exercise.                Follow Up   Return in about 6 months (around 4/2/2024).  Patient was given instructions and counseling regarding his condition or for health maintenance advice. Please see specific information pulled into the AVS if appropriate.

## 2023-10-16 RX ORDER — ROSUVASTATIN CALCIUM 10 MG/1
10 TABLET, COATED ORAL DAILY
Qty: 90 TABLET | Refills: 1 | Status: SHIPPED | OUTPATIENT
Start: 2023-10-16

## 2023-10-25 ENCOUNTER — EXTERNAL PBMM DATA (OUTPATIENT)
Dept: PHARMACY | Facility: OTHER | Age: 68
End: 2023-10-25
Payer: MEDICARE

## 2023-11-03 ENCOUNTER — TELEPHONE (OUTPATIENT)
Dept: DIABETES SERVICES | Facility: HOSPITAL | Age: 68
End: 2023-11-03

## 2023-11-03 NOTE — TELEPHONE ENCOUNTER
"Caller: Chaparro Weber \"Lane\"    Relationship: Self    Best call back number: [phone]    What form or medical record are you requesting: PT IS UNSURE OF THE PAPERWORK    Who is requesting this form or medical record from you: [employer]    How would you like to receive the form or medical records (pick-up, mail, fax):   If fax, what is the fax number:   If mail, what is the address:   If pick-up, provide patient with address and location details    Timeframe paperwork needed: N/A    Additional notes: PT STATED THAT THE PAPERWORK IS AT THE OFFICE. AND IT IS FOR HIM TO GET HIS INSULIN FREE. PT HAS TO RENEW THIS PAPERWORK EVERY YEAR. PLEASE CALL PT WITH CLARIFICATION CAUSE HE IS A BIT CONFUSED.         "

## 2023-11-06 RX ORDER — ESCITALOPRAM OXALATE 20 MG/1
TABLET ORAL
Qty: 90 TABLET | Refills: 1 | Status: SHIPPED | OUTPATIENT
Start: 2023-11-06

## 2023-11-07 ENCOUNTER — PATIENT MESSAGE (OUTPATIENT)
Dept: DIABETES SERVICES | Facility: HOSPITAL | Age: 68
End: 2023-11-07
Payer: MEDICARE

## 2023-11-13 ENCOUNTER — POP HEALTH PHARMACY (OUTPATIENT)
Dept: PHARMACY | Facility: OTHER | Age: 68
End: 2023-11-13
Payer: MEDICARE

## 2023-11-13 NOTE — PROGRESS NOTES
[facility]      Chaparro Weber was called today to discuss medication adherence with [facility] , as he was identified as having care opportunities.    Program Details    [facility]  Status: Enrolled  Effective Dates: 11/8/2023 - present  Responsible Staff: Anastacia Fregoso Identified    Adherence- Hypertension       Adherence and Medication Management        General Medication Management    Type of medication management: targeted medication review  Referred by: insurance group  Recipient: beneficiary  Provider: pharmacist - other  Visit type: initial  Method of contact: by telephone                             Medication Therapy Problems     Medication Therapy Recommendations  No medication therapy recommendations to display      Summary  Spoke with Mr. Weber and he asked if I would outreach to pharmacy to confirm refills and if not message his PCP to get refills sent. Pharmacy stated there was a refill and they would get it ready. No other issues or barriers.    Medication Management Summary    Topics discussed: reminder to refill or  medication discussed         Anastacia Fregoso, 11/13/23, 1:17 PM EST.

## 2023-12-04 ENCOUNTER — POP HEALTH PHARMACY (OUTPATIENT)
Dept: PHARMACY | Facility: OTHER | Age: 68
End: 2023-12-04
Payer: MEDICARE

## 2023-12-04 NOTE — PROGRESS NOTES
Chief Complaint  Diabetes (Follow up, med mgt, A1c eval, cgm eval )    Referred By: DO Zander Guevara presents to [facility] for diabetes medication management    History of Present Illness    Visit type:  follow-up  Diabetes type:  Type 2  Current diabetes status/concerns/issues:  He has had an 8 pound weight gain.  He has not been able to get back to his routine exercise as he is still recovering from his surgery in June  Other health concerns: None reported  Current Diabetes symptoms:    Polyuria: No   Polydipsia: No   Polyphagia: No   Blurred vision: No   Excessive fatigue: No  Known Diabetes complications:  Neuropathy: Tingling; Location: Feet  Renal: Stage II mild (GFR = 60-89mL/min)  Eyes: None; Location: N/A  Amputation/Wounds: None  GI: None  Cardiovascular: Hypertension, Hyperlipidemia, CHF and Cardiomyopathy  ED: None  Other: None  Hypoglycemia:  None reported at this time  Hypoglycemia Symptoms:  No hypoglycemia at this time  Current diabetes treatment:  Trulicity 3 mg once weekly, Basaglar 45 units twice a day, and Lyumjev insulin before each meal taking 8, 10, or 12 units based on small, medium, or large amounts of carbohydrates plus adding correction for glucose levels greater than 150 mg/dL starting with 2 units and increasing by 1 unit for every 25 mg/dL thereafter.  He states he is taking 35-40 units of Lyumjev with each meal    Blood glucose device:  KAYAK CGM  Blood glucose monitoring frequency:  Continuous per CGM  Blood glucose range/average:  The 14-day sensor report shows an average glucose of 204 mg/dL, with 35% in target range ( mgdL), 49% in the high range (181-250 mg/dL), 16% in the very high range (>250 mg/dL), 0% in the low range (54-70 mg/dL) and 0% in the very low range (<54 mg/dL).   Glucose Source: Device Reviewed  Diet:  Limits high carb/sweet foods, Avoids sugary drinks  Activity/Exercise:  None    Past  Medical History:   Diagnosis Date    Anxiety     Arthritis     Asthma     - PT DENIED ASTHMA- NO INHALERS    Bradycardia     DEFBIRLLATOR/ PACEMAKER- MEDITRONIC- SEE'S DR. HAMEED DENIED CP/SOB    Chest pain     NO RECENT CP    CHF (congestive heart failure)     ASYMPTOMATIC    Condition not found     Hernia-Unspecified    Coronary artery disease     Deafness     Diabetes     BG RUNS AROUND 250-300 IN AM    HBP (high blood pressure)     Heart disease     High cholesterol     Kidney stones     Lung disease     Night sweats     Ringing in ear     Sleep apnea     Type 2 diabetes mellitus with autonomic neuropathy 07/16/2021     Past Surgical History:   Procedure Laterality Date    APPENDECTOMY      BACK SURGERY  2000,2010    lumbar with hardware    CARDIAC DEFIBRILLATOR PLACEMENT      MEDITRONIC    CHOLECYSTECTOMY      COLONOSCOPY N/A 06/22/2022    Procedure: COLONOSCOPY;  Surgeon: Nicholas Townsend MD;  Location: [facility] END[facility];  Service: General;  Laterality: N/A;  DIVERTICULOSIS    CYSTOSCOPY W/ STONE MANIPULATION Right 1/5/2023    Procedure: CYSTOSCOPY KIDNEY STONE MANIPULATION/EXTRACTION;  Surgeon: Ann Mon MD;  Location: [facility] MAIN OR;  Service: Urology;  Laterality: Right;    KIDNEY STONE SURGERY      NECK SURGERY  2005    removal of bone spurs    PACEMAKER IMPLANTATION      10 YEARS AGO PLACED- MEDITRONIC    URETEROSCOPY LASER LITHOTRIPSY WITH STENT INSERTION Left 1/5/2023    Procedure: LEFT URETEROSCOPY LASER LITHOTRIPSY WITH STENT INSERTION AND RETROGRADE; RIGHT URETEROSCOPY WITH STENT INSERTION AND RETROGRADE;  Surgeon: Ann Mon MD;  Location: [facility] MAIN OR;  Service: Urology;  Laterality: Left;     Family History   Problem Relation Age of Onset    Other Mother 60        Renal Cancer    Prostate cancer Father     Colon cancer Father 70    Malig Hyperthermia Neg Hx      Social History     Socioeconomic History    Marital status:     Number of children: 2   Tobacco Use    Smoking  status: Former     Packs/day: 1.50     Years: 40.00     Additional pack years: 0.00     Total pack years: 60.00     Types: Cigarettes     Start date: 1965     Quit date:      Years since quittin.9     Passive exposure: Past    Smokeless tobacco: Never    Tobacco comments:     no second hand smoke exposure   Vaping Use    Vaping Use: Never used   Substance and Sexual Activity    Alcohol use: Never    Drug use: Never    Sexual activity: Defer     Allergies   Allergen Reactions    Codeine Nausea Only and Nausea And Vomiting       Current Outpatient Medications:     aspirin  MG tablet, Take 1 tablet by mouth Daily. PT STATED DR CHEN OFFICE INSTRUCTED TO STOP 5 DAYS PRIOR TO SURGERY, LAST DOSE PT STATED INSTRUCTED TO TAKE IS 22, Disp: , Rfl:     celecoxib (CeleBREX) 100 MG capsule, TAKE ONE CAPSULE BY MOUTH TWICE A DAY AS DIRECTED FOR 30 DAYS., Disp: 180 capsule, Rfl: 1    cholecalciferol (VITAMIN D3) 25 MCG (1000 UT) tablet, Take 2 tablets by mouth Daily., Disp: , Rfl:     Diclofenac Sodium (VOLTAREN) 1 % gel gel, APPLY 2 GRAMS TO THE AFFECTED AREA(S) BY TOPICAL ROUTE 4 TIMES PER DAY, Disp: , Rfl:     escitalopram (LEXAPRO) 20 MG tablet, TAKE ONE TABLET BY MOUTH ONCE DAILY, Disp: 90 tablet, Rfl: 1    fenofibrate (TRICOR) 145 MG tablet, TAKE ONE TABLET BY MOUTH ONCE DAILY, Disp: 90 tablet, Rfl: 1    glucose blood test strip, Use as instructed, Disp: 100 each, Rfl: 2    glucose blood test strip, OneTouch Ultra Test strips, Disp: , Rfl:     ibuprofen (ADVIL,MOTRIN) 200 MG tablet, Take 1 tablet by mouth Every 6 (Six) Hours As Needed for Mild Pain., Disp: , Rfl:     Insulin Glargine (BASAGLAR KWIKPEN) 100 UNIT/ML injection pen, Inject 90 Units under the skin into the appropriate area as directed Daily for 180 days., Disp: 81 mL, Rfl: 1    Insulin Lispro-aabc (Lyumjev KwikPen) 200 UNIT/ML solution pen-injector, Inject 25 Units under the skin into the appropriate area as directed 3 (Three) Times a  "Day Before Meals., Disp: 6 mL, Rfl: 0    Insulin Pen Needle (Pen Needles) 32G X 5 MM misc, Use 1 each 5 (Five) Times a Day., Disp: 450 each, Rfl: 1    methylcellulose, Laxative, (CITRUCEL) 500 MG tablet tablet, , Disp: , Rfl:     metoprolol succinate XL (TOPROL-XL) 25 MG 24 hr tablet, Take 1 tablet by mouth Daily., Disp: , Rfl:     oxyCODONE-acetaminophen (PERCOCET)  MG per tablet, Take 1 tablet by mouth Every 4 (Four) Hours As Needed., Disp: , Rfl:     pregabalin (LYRICA) 300 MG capsule, Take 1 capsule by mouth Daily., Disp: , Rfl:     psyllium (Metamucil) 0.52 g capsule, , Disp: , Rfl:     rosuvastatin (CRESTOR) 10 MG tablet, TAKE 1 TABLET BY MOUTH DAILY., Disp: 90 tablet, Rfl: 1    tamsulosin (FLOMAX) 0.4 MG capsule 24 hr capsule, Take 2 capsules by mouth Daily for 360 days., Disp: 180 capsule, Rfl: 3    valsartan (DIOVAN) 160 MG tablet, Take 1 tablet by mouth Daily., Disp: 90 tablet, Rfl: 1    vitamin B-12 (CYANOCOBALAMIN) 1000 MCG tablet, Take 1,200 mcg by mouth Daily., Disp: , Rfl:     vitamin E 100 UNIT capsule, Take 1 capsule by mouth Daily., Disp: , Rfl:     Zinc 50 MG capsule, Take  by mouth., Disp: , Rfl:     Objective     Vitals:    12/05/23 0733   BP: 119/69   Pulse: 93   SpO2: 92%   Weight: 123 kg (272 lb)   Height: 188 cm (74\")   PainSc:   6     Body mass index is 34.92 kg/m².    Physical Exam  Constitutional:       Appearance: Normal appearance. He is obese.      Comments: Obesity (BMI 30 - 39.9) Pt Current BMI = 34.92    HENT:      Head: Normocephalic and atraumatic.      Right Ear: External ear normal.      Left Ear: External ear normal.      Nose: Nose normal.   Eyes:      Extraocular Movements: Extraocular movements intact.      Conjunctiva/sclera: Conjunctivae normal.   Pulmonary:      Effort: Pulmonary effort is normal.   Musculoskeletal:         General: Normal range of motion.      Cervical back: Normal range of motion.   Skin:     General: Skin is warm and dry.   Neurological:      " General: No focal deficit present.      Mental Status: He is alert and oriented to person, place, and time. Mental status is at baseline.   Psychiatric:         Mood and Affect: Mood normal.         Behavior: Behavior normal.         Thought Content: Thought content normal.         Judgment: Judgment normal.             Result Review :   The following data was reviewed by: ANGLE Mcdonnell on 12/05/2023:    Most Recent A1C          12/5/2023    07:38   HGBA1C Most Recent   Hemoglobin A1C 7.5        A1C Last 3 Results          8/28/2023    08:50 10/2/2023    08:09 12/5/2023    07:38   HGBA1C Last 3 Results   Hemoglobin A1C 7.1  7.30  7.5      A1c collected in the office today is 7.5%, indicating Uncontrolled Type II diabetes.  This result is up from the prior result of 7.3% collected on 10/2/23     Glucose   Date Value Ref Range Status   12/05/2023 183 (H) 70 - 99 mg/dL Final     Comment:     Serial Number: [id number]:  [id number]   07/01/2023 172 (H) 71 - 139 mg/dL Final     Comment:     H     Point of care glucose in the office today is  elevated at 183 mg/dL    Creatinine   Date Value Ref Range Status   10/02/2023 1.03 0.76 - 1.27 mg/dL Final   06/14/2023 1.07 0.76 - 1.27 mg/dL Final     eGFR   Date Value Ref Range Status   10/02/2023 80.1 >60.0 mL/min/1.73 Final   06/14/2023 77.0 >60.0 mL/min/1.73 Final     Labs collected on 10/2/23 show Stage II mild (GFR = 60-89mL/min)    Total Cholesterol   Date Value Ref Range Status   10/02/2023 127 0 - 200 mg/dL Final   05/08/2023 153 0 - 200 mg/dL Final     Triglycerides   Date Value Ref Range Status   10/02/2023 225 (H) 0 - 150 mg/dL Final   05/08/2023 197 (H) 0 - 150 mg/dL Final     HDL Cholesterol   Date Value Ref Range Status   10/02/2023 25 (L) 40 - 60 mg/dL Final   05/08/2023 38 (L) 40 - 60 mg/dL Final     LDL Cholesterol    Date Value Ref Range Status   10/02/2023 65 0 - 100 mg/dL Final   05/08/2023 82 0 - 100 mg/dL Final     Lipid panel collected on  10/2/23 shows Hypertriglyceridemia and low HDL            Assessment: The patient had a slight increase in his A1c.  This is in spite of him increasing the amount of insulin he uses.  He has had no weight gain as well most likely brought on by the increased insulin usage.      Diagnoses and all orders for this visit:    1. Uncontrolled type 2 diabetes mellitus with hyperglycemia (Primary)  -     POC Glycosylated Hemoglobin (Hb A1C)  -     Insulin Pen Needle (Pen Needles) 32G X 5 MM misc; Use 1 each 5 (Five) Times a Day.  Dispense: 450 each; Refill: 1    2. Type 2 diabetes mellitus with diabetic neuropathy, with long-term current use of insulin    3. Type 2 diabetes mellitus with stage 2 chronic kidney disease, with long-term current use of insulin    4. Obesity (BMI 30-39.9)    5. Uses self-applied continuous glucose monitoring device    Other orders  -     POC Glucose        Plan: We will increase his Trulicity to 4.5 mg once weekly.  No other changes will be made at this time.  The patient is to focus on his dietary behavior to help control glucose levels as well as to promote weight loss.    The patient will monitor his blood glucose levels using the continuous glucose sensor.  If he develops problematic hyperglycemia or hypoglycemia or adverse drug reactions, he will contact the office for further instructions.        Follow Up     Return in about 3 months (around 3/5/2024) for Medication Management, CGM Follow-up.    Patient was given instructions and counseling regarding his condition or for health maintenance advice. Please see specific information pulled into the AVS if appropriate.     Suzanna Agosto, ANGLE  12/05/2023      Dictated Utilizing Dragon Dictation.  Please note that portions of this note were completed with a voice recognition program.  Part of this note may be an electronic transcription/translation of spoken language to printed text using the Dragon Dictation System.

## 2023-12-05 ENCOUNTER — OFFICE VISIT (OUTPATIENT)
Dept: DIABETES SERVICES | Facility: HOSPITAL | Age: 68
End: 2023-12-05
Payer: MEDICARE

## 2023-12-05 VITALS
SYSTOLIC BLOOD PRESSURE: 119 MMHG | BODY MASS INDEX: 34.91 KG/M2 | HEIGHT: 74 IN | DIASTOLIC BLOOD PRESSURE: 69 MMHG | HEART RATE: 93 BPM | WEIGHT: 272 LBS | OXYGEN SATURATION: 92 %

## 2023-12-05 DIAGNOSIS — Z79.4 TYPE 2 DIABETES MELLITUS WITH DIABETIC NEUROPATHY, WITH LONG-TERM CURRENT USE OF INSULIN: ICD-10-CM

## 2023-12-05 DIAGNOSIS — E66.9 OBESITY (BMI 30-39.9): ICD-10-CM

## 2023-12-05 DIAGNOSIS — Z79.4 TYPE 2 DIABETES MELLITUS WITH STAGE 2 CHRONIC KIDNEY DISEASE, WITH LONG-TERM CURRENT USE OF INSULIN: ICD-10-CM

## 2023-12-05 DIAGNOSIS — E11.22 TYPE 2 DIABETES MELLITUS WITH STAGE 2 CHRONIC KIDNEY DISEASE, WITH LONG-TERM CURRENT USE OF INSULIN: ICD-10-CM

## 2023-12-05 DIAGNOSIS — E11.40 TYPE 2 DIABETES MELLITUS WITH DIABETIC NEUROPATHY, WITH LONG-TERM CURRENT USE OF INSULIN: ICD-10-CM

## 2023-12-05 DIAGNOSIS — Z97.8 USES SELF-APPLIED CONTINUOUS GLUCOSE MONITORING DEVICE: ICD-10-CM

## 2023-12-05 DIAGNOSIS — E11.65 UNCONTROLLED TYPE 2 DIABETES MELLITUS WITH HYPERGLYCEMIA: Primary | ICD-10-CM

## 2023-12-05 DIAGNOSIS — N18.2 TYPE 2 DIABETES MELLITUS WITH STAGE 2 CHRONIC KIDNEY DISEASE, WITH LONG-TERM CURRENT USE OF INSULIN: ICD-10-CM

## 2023-12-05 LAB
EXPIRATION DATE: ABNORMAL
GLUCOSE BLDC GLUCOMTR-MCNC: 183 MG/DL (ref 70–99)
HBA1C MFR BLD: 7.5 % (ref 4.5–5.7)
Lab: ABNORMAL

## 2023-12-05 PROCEDURE — 3078F DIAST BP <80 MM HG: CPT | Performed by: NURSE PRACTITIONER

## 2023-12-05 PROCEDURE — 99214 OFFICE O/P EST MOD 30 MIN: CPT | Performed by: NURSE PRACTITIONER

## 2023-12-05 PROCEDURE — 82948 REAGENT STRIP/BLOOD GLUCOSE: CPT | Performed by: NURSE PRACTITIONER

## 2023-12-05 PROCEDURE — 3074F SYST BP LT 130 MM HG: CPT | Performed by: NURSE PRACTITIONER

## 2023-12-05 PROCEDURE — 1159F MED LIST DOCD IN RCRD: CPT | Performed by: NURSE PRACTITIONER

## 2023-12-05 PROCEDURE — 3051F HG A1C>EQUAL 7.0%<8.0%: CPT | Performed by: NURSE PRACTITIONER

## 2023-12-05 PROCEDURE — G0463 HOSPITAL OUTPT CLINIC VISIT: HCPCS | Performed by: NURSE PRACTITIONER

## 2023-12-05 PROCEDURE — 95251 CONT GLUC MNTR ANALYSIS I&R: CPT | Performed by: NURSE PRACTITIONER

## 2023-12-05 PROCEDURE — 1160F RVW MEDS BY RX/DR IN RCRD: CPT | Performed by: NURSE PRACTITIONER

## 2023-12-05 RX ORDER — BLOOD SUGAR DIAGNOSTIC
1 STRIP MISCELLANEOUS
Qty: 450 EACH | Refills: 1 | Status: SHIPPED | OUTPATIENT
Start: 2023-12-05

## 2023-12-26 ENCOUNTER — TELEPHONE (OUTPATIENT)
Dept: DIABETES SERVICES | Facility: HOSPITAL | Age: 68
End: 2023-12-26
Payer: MEDICARE

## 2023-12-26 DIAGNOSIS — E11.65 UNCONTROLLED TYPE 2 DIABETES MELLITUS WITH HYPERGLYCEMIA: ICD-10-CM

## 2023-12-26 RX ORDER — INSULIN LISPRO-AABC 200 [IU]/ML
40 INJECTION, SOLUTION SUBCUTANEOUS
Qty: 72 ML | Refills: 4 | Status: SHIPPED | OUTPATIENT
Start: 2023-12-26

## 2023-12-26 RX ORDER — INSULIN LISPRO-AABC 200 [IU]/ML
40 INJECTION, SOLUTION SUBCUTANEOUS
Qty: 72 ML | Refills: 4 | COMMUNITY
Start: 2023-12-26 | End: 2023-12-26 | Stop reason: SDUPTHER

## 2023-12-26 NOTE — TELEPHONE ENCOUNTER
"I resent the prescription to pharmacy. The refill that was sent earlier, actually did not send as \"normal\" to the pharmacy. It was selected as \"sample\". This was changed back to \"normal\". No samples of this was gave to the pt at all.    "

## 2023-12-26 NOTE — TELEPHONE ENCOUNTER
"Pt called to request an updated RX be sent to [facility] for Lyumjev U200. Pt stated that an updated script for the Trulicity was sent and the pharmacy got it, but that they did not receive one for the Lyumjev. With the increased amount from his last appt with Suzanna.   Braden's last visit note on 12/5/23 \"Lyumjev insulin before each meal taking 8, 10, or 12 units based on small, medium, or large amounts of carbohydrates plus adding correction for glucose levels greater than 150 mg/dL starting with 2 units and increasing by 1 unit for every 25 mg/dL thereafter.  He states he is taking 35-40 units of Lyumjev with each meal\"  A refill for Lyumjev u200 was sent to the pharmacy requested.     "

## 2023-12-26 NOTE — TELEPHONE ENCOUNTER
"Braden's notes on 12/5/23    \" Lyumjev insulin before each meal taking 8, 10, or 12 units based on small, medium, or large amounts of carbohydrates plus adding correction for glucose levels greater than 150 mg/dL starting with 2 units and increasing by 1 unit for every 25 mg/dL thereafter.  He states he is taking 35-40 units of Lyumjev with each meal  \"   "

## 2023-12-28 ENCOUNTER — POP HEALTH PHARMACY (OUTPATIENT)
Dept: PHARMACY | Facility: OTHER | Age: 68
End: 2023-12-28
Payer: MEDICARE

## 2023-12-29 ENCOUNTER — TELEPHONE (OUTPATIENT)
Dept: UROLOGY | Facility: CLINIC | Age: 68
End: 2023-12-29
Payer: MEDICARE

## 2023-12-29 NOTE — PROGRESS NOTES
Chief Complaint: Benign Prostatic Hypertrophy and Nocturia    Subjective         History of Present Illness  Chaparro Weber is a 68 y.o. male presents to [facility] to be seen for follow-up.    Patient was previously seen by me with last visit on 5/15/2023 for BPH/kidney stones.  He was doing markedly better with 2 tamsulosin daily at that visit and he was advised to follow-up with a KUB prior.  He is here for follow-up.    He had back surgery in June. He didn't not have any urinary symptoms post op.     He is doing well with his urinary symptoms.     He has stopped drinking tea and soda.     PSA  10/2023 <0.014    PROCEDURE:  XR ABDOMEN KUB     COMPARISON: [facility], CR, XR ABDOMEN KUB, 1/05/2023, 11:59.     INDICATIONS:  history of kidney stones. YEARLY FOLLOW UP. NO COMPLAINTS     FINDINGS:          There is an 8 mm stone projected over the lower pole of the left kidney.  No stones are identified   over the right kidney.  There are postoperative changes of lumbar fusion from L2 through L5.  The   patient has undergone prior cholecystectomy.     IMPRESSION:                 1. 8 mm calcified stone projected over the lower pole the left kidney.            Live Garcia MD         Electronically Signed and Approved By: Live Garcia MD on 1/02/2024 at 12:51                  Previous 5/15/2023:  Patient was previously seen by Dr. Mon on 1/11/2023 for follow-up of kidney stones.  He is to have a KUB next January for follow-up of kidney stones.  He was also seen and treated for BPH with nocturia.  His Flomax was increased to 2 capsules/day and he was advised to follow-up in 3 months to see if this has improved his symptoms. He reports his symptoms have improved dramatically since increasing Flomax. He reports he is having 0-1 episodes of nocturia.      PSA  7/29/2022 less than 0.014  7/19/2021 less than 0.014  9/18/2020 less than 0.01     Previous 1/11/2023:  The patient  presents for a follow-up from bilateral ureteroscopy with laser on the right and stone extraction on the left. He had bilateral ureteral stents in place.     The patient reports he is doing well. He had kidney stones approximately 8 to 10 years ago. He drinks a lot of water. He states he plays pickleball. He reports his back has been bothering him. He had a little pain yesterday and this morning. He states he is feeling good. Both of his stents are out. He was working on his prostate and it was really enlarged. The medicine he was put on did not seem to work. He stopped taking it because of the stones. He has had a lot of prostate issues for a while.    Objective     Past Medical History:   Diagnosis Date    Anxiety     Arthritis     Asthma     - PT DENIED ASTHMA- NO INHALERS    Bradycardia     DEFBIRLLATOR/ PACEMAKER- MEDITRONIC- SEE'S DR. HAMEED DENIED CP/SOB    Chest pain     NO RECENT CP    CHF (congestive heart failure)     ASYMPTOMATIC    Condition not found     Hernia-Unspecified    Coronary artery disease     Deafness     Diabetes     BG RUNS AROUND 250-300 IN AM    HBP (high blood pressure)     Heart disease     High cholesterol     Kidney stones     Lung disease     Night sweats     Ringing in ear     Sleep apnea     Type 2 diabetes mellitus with autonomic neuropathy 07/16/2021       Past Surgical History:   Procedure Laterality Date    APPENDECTOMY      BACK SURGERY  2000,2010    lumbar with hardware    CARDIAC DEFIBRILLATOR PLACEMENT      MEDITRONIC    CHOLECYSTECTOMY      COLONOSCOPY N/A 06/22/2022    Procedure: COLONOSCOPY;  Surgeon: Nicholas Townsend MD;  Location: [facility] ENDOSCOPY;  Service: General;  Laterality: N/A;  DIVERTICULOSIS    CYSTOSCOPY W/ STONE MANIPULATION Right 1/5/2023    Procedure: CYSTOSCOPY KIDNEY STONE MANIPULATION/EXTRACTION;  Surgeon: Ann Mon MD;  Location: [facility] MAIN OR;  Service: Urology;  Laterality: Right;    KIDNEY STONE SURGERY      NECK SURGERY  2005    removal  of bone spurs    PACEMAKER IMPLANTATION      10 YEARS AGO PLACED- MEDITRONIC    URETEROSCOPY LASER LITHOTRIPSY WITH STENT INSERTION Left 1/5/2023    Procedure: LEFT URETEROSCOPY LASER LITHOTRIPSY WITH STENT INSERTION AND RETROGRADE; RIGHT URETEROSCOPY WITH STENT INSERTION AND RETROGRADE;  Surgeon: Ann Mon MD;  Location: [facility] OR;  Service: Urology;  Laterality: Left;         Current Outpatient Medications:     aspirin  MG tablet, Take 1 tablet by mouth Daily. PT STATED DR MON OFFICE INSTRUCTED TO STOP 5 DAYS PRIOR TO SURGERY, LAST DOSE PT STATED INSTRUCTED TO TAKE IS 12-30-22, Disp: , Rfl:     celecoxib (CeleBREX) 100 MG capsule, TAKE ONE CAPSULE BY MOUTH TWICE A DAY AS DIRECTED FOR 30 DAYS., Disp: 180 capsule, Rfl: 1    cholecalciferol (VITAMIN D3) 25 MCG (1000 UT) tablet, Take 2 tablets by mouth Daily., Disp: , Rfl:     Diclofenac Sodium (VOLTAREN) 1 % gel gel, APPLY 2 GRAMS TO THE AFFECTED AREA(S) BY TOPICAL ROUTE 4 TIMES PER DAY, Disp: , Rfl:     escitalopram (LEXAPRO) 20 MG tablet, TAKE ONE TABLET BY MOUTH ONCE DAILY, Disp: 90 tablet, Rfl: 1    fenofibrate (TRICOR) 145 MG tablet, TAKE ONE TABLET BY MOUTH ONCE DAILY, Disp: 90 tablet, Rfl: 1    glucose blood test strip, Use as instructed, Disp: 100 each, Rfl: 2    glucose blood test strip, OneTouch Ultra Test strips, Disp: , Rfl:     ibuprofen (ADVIL,MOTRIN) 200 MG tablet, Take 1 tablet by mouth Every 6 (Six) Hours As Needed for Mild Pain., Disp: , Rfl:     Insulin Glargine (BASAGLAR KWIKPEN) 100 UNIT/ML injection pen, Inject 90 Units under the skin into the appropriate area as directed Daily for 180 days., Disp: 81 mL, Rfl: 1    Insulin Pen Needle (Pen Needles) 32G X 5 MM misc, Use 1 each 5 (Five) Times a Day., Disp: 450 each, Rfl: 1    Lyumjev KwikPen 200 UNIT/ML solution pen-injector, Inject 40 Units under the skin into the appropriate area as directed 3 (Three) Times a Day Before Meals., Disp: 72 mL, Rfl: 4    methylcellulose,  "Laxative, (CITRUCEL) 500 MG tablet tablet, , Disp: , Rfl:     metoprolol succinate XL (TOPROL-XL) 25 MG 24 hr tablet, Take 1 tablet by mouth Daily., Disp: , Rfl:     oxyCODONE-acetaminophen (PERCOCET)  MG per tablet, Take 1 tablet by mouth Every 4 (Four) Hours As Needed., Disp: , Rfl:     pregabalin (LYRICA) 300 MG capsule, Take 1 capsule by mouth Daily., Disp: , Rfl:     psyllium (Metamucil) 0.52 g capsule, , Disp: , Rfl:     rosuvastatin (CRESTOR) 10 MG tablet, TAKE 1 TABLET BY MOUTH DAILY., Disp: 90 tablet, Rfl: 1    tamsulosin (FLOMAX) 0.4 MG capsule 24 hr capsule, Take 2 capsules by mouth Daily for 360 days., Disp: 180 capsule, Rfl: 3    valsartan (DIOVAN) 160 MG tablet, Take 1 tablet by mouth Daily., Disp: 90 tablet, Rfl: 1    vitamin B-12 (CYANOCOBALAMIN) 1000 MCG tablet, Take 1,200 mcg by mouth Daily., Disp: , Rfl:     vitamin E 100 UNIT capsule, Take 1 capsule by mouth Daily., Disp: , Rfl:     Zinc 50 MG capsule, Take  by mouth., Disp: , Rfl:     Allergies   Allergen Reactions    Codeine Nausea Only and Nausea And Vomiting        Family History   Problem Relation Age of Onset    Other Mother 60        Renal Cancer    Prostate cancer Father     Colon cancer Father 70    Malig Hyperthermia Neg Hx        Social History     Socioeconomic History    Marital status:     Number of children: 2   Tobacco Use    Smoking status: Former     Packs/day: 1.50     Years: 40.00     Additional pack years: 0.00     Total pack years: 60.00     Types: Cigarettes     Start date: 1965     Quit date:      Years since quittin.0     Passive exposure: Past    Smokeless tobacco: Never    Tobacco comments:     no second hand smoke exposure   Vaping Use    Vaping Use: Never used   Substance and Sexual Activity    Alcohol use: Never    Drug use: Never    Sexual activity: Defer       Vital Signs:   /70 (BP Location: Left arm, Patient Position: Sitting, Cuff Size: Large Adult)   Pulse 90   Ht 188 cm (74\")  "  Wt 123 kg (272 lb)   BMI 34.92 kg/m²      Physical Exam  Vitals reviewed.   Constitutional:       Appearance: Normal appearance.   Neurological:      General: No focal deficit present.      Mental Status: He is alert and oriented to person, place, and time.   Psychiatric:         Mood and Affect: Mood normal.         Behavior: Behavior normal.          Result Review :   The following data was reviewed by: ANGLE Knox on 01/03/2024:     PSA          10/2/2023    08:09   PSA   PSA <0.014      Bladder Scan interpretation 01/03/2024    Estimation of residual urine via BVI 3000 Verathon Bladder Scan  MA/nurse performing: Miriam BARBOZA MA  Residual Urine: 0 ml  Indication: Benign prostatic hyperplasia with nocturia    Kidney stone   Position: Supine  Examination: Incremental scanning of the suprapubic area using 2.0 MHz transducer using copious amounts of acoustic gel.   Findings: An anechoic area was demonstrated which represented the bladder, with measurement of residual urine as noted. I inspected this myself. In that the residual urine was insignificant, refer to plan for treatment and plan necessary at this time.           Procedures        Assessment and Plan    Diagnoses and all orders for this visit:    1. Benign prostatic hyperplasia with nocturia (Primary)  -     Bladder Scan    2. Kidney stone    Given he is doing well on tamsulosin we will continue this medication.    We reviewed the results of his KUB which continue to show stone on the left side.  Will plan to repeat his KUB in 1 year, I will order this at his 6-month follow-up.    His PSA is stable.    I will see him back in 6 months or sooner for new concerns.  Follow Up   Return in about 6 months (around 7/3/2024).  Patient was given instructions and counseling regarding his condition or for health maintenance advice. Please see specific information pulled into the AVS if appropriate.         This document has been electronically signed  by Navya Lopez, APRN  January 3, 2024 08:41 EST

## 2023-12-29 NOTE — TELEPHONE ENCOUNTER
Called patient to see if he had gotten his KUB, patient stated that he had not. I ask him if he could do it today but he was unable to. He stated that he would go Monday but it being a holiday the facility might be closed. He said that he would Tuesday but I told him that I was unsure if we would get the report. I told him to go ahead and we would let him know if we got it and possible re-schedule if that was not the case

## 2024-01-02 ENCOUNTER — HOSPITAL ENCOUNTER (OUTPATIENT)
Dept: GENERAL RADIOLOGY | Facility: HOSPITAL | Age: 69
Discharge: HOME | End: 2024-01-02
Admitting: NURSE PRACTITIONER
Payer: MEDICARE

## 2024-01-02 DIAGNOSIS — N20.0 KIDNEY STONE: ICD-10-CM

## 2024-01-02 PROCEDURE — 74018 RADEX ABDOMEN 1 VIEW: CPT

## 2024-01-03 ENCOUNTER — OFFICE VISIT (OUTPATIENT)
Dept: UROLOGY | Facility: CLINIC | Age: 69
End: 2024-01-03
Payer: MEDICARE

## 2024-01-03 VITALS
HEIGHT: 74 IN | HEART RATE: 90 BPM | SYSTOLIC BLOOD PRESSURE: 135 MMHG | BODY MASS INDEX: 34.91 KG/M2 | WEIGHT: 272 LBS | DIASTOLIC BLOOD PRESSURE: 70 MMHG

## 2024-01-03 DIAGNOSIS — N40.1 BENIGN PROSTATIC HYPERPLASIA WITH NOCTURIA: Primary | ICD-10-CM

## 2024-01-03 DIAGNOSIS — N20.0 KIDNEY STONE: ICD-10-CM

## 2024-01-03 DIAGNOSIS — R35.1 BENIGN PROSTATIC HYPERPLASIA WITH NOCTURIA: Primary | ICD-10-CM

## 2024-01-03 LAB — URINE VOLUME: 0

## 2024-01-03 PROCEDURE — 3075F SYST BP GE 130 - 139MM HG: CPT | Performed by: NURSE PRACTITIONER

## 2024-01-03 PROCEDURE — 1160F RVW MEDS BY RX/DR IN RCRD: CPT | Performed by: NURSE PRACTITIONER

## 2024-01-03 PROCEDURE — 3078F DIAST BP <80 MM HG: CPT | Performed by: NURSE PRACTITIONER

## 2024-01-03 PROCEDURE — 51798 US URINE CAPACITY MEASURE: CPT | Performed by: NURSE PRACTITIONER

## 2024-01-03 PROCEDURE — 99213 OFFICE O/P EST LOW 20 MIN: CPT | Performed by: NURSE PRACTITIONER

## 2024-01-03 PROCEDURE — 1159F MED LIST DOCD IN RCRD: CPT | Performed by: NURSE PRACTITIONER

## 2024-02-26 ENCOUNTER — TELEPHONE (OUTPATIENT)
Dept: DIABETES SERVICES | Facility: HOSPITAL | Age: 69
End: 2024-02-26
Payer: MEDICARE

## 2024-02-26 ENCOUNTER — PATIENT MESSAGE (OUTPATIENT)
Dept: DIABETES SERVICES | Facility: HOSPITAL | Age: 69
End: 2024-02-26
Payer: MEDICARE

## 2024-02-26 NOTE — TELEPHONE ENCOUNTER
"Caller: Chaparro Weber \"Lane\"    Relationship: Self    Best call back number: [phone]     What form or medical record are you requesting:   [facility] REENROLLMENT PAPERWORK (LOCATED IN MEDIA 12/7/24)    Who is requesting this form or medical record from you: [facility] ASSISTANCE [employer]GRAM    How would you like to receive the form or medical records (pick-up, mail, fax): FAX  If fax, what is the fax number: [phone]    Timeframe paperwork needed: ASAP    Additional notes: PATIENT WAS TOLD BY [facility] THEY DID NOT RECEIVE THE RE-ENROLLMENT PAPERWORK (SPECIFICALLY PAGES 7 AND 8.) PLEASE REFAX AS SOON AS POSSIBLE FOR PATIENT TO RECEIVE COVERAGE ASSISTANCE.           "

## 2024-02-26 NOTE — TELEPHONE ENCOUNTER
"  Hub staff attempted to follow warm transfer process and was unsuccessful     Caller: Chaparro Weber \"Lane\"    Relationship to patient: Self    Best call back number: [phone]     Patient is needing: PT IS HAVING TROUBLE GETTING CECILLE AND NEEDS TO SPEAK TO SOMEONE AND REQUESTED TO SPEAK TO BE.          "

## 2024-02-27 ENCOUNTER — TELEPHONE (OUTPATIENT)
Dept: UROLOGY | Facility: CLINIC | Age: 69
End: 2024-02-27
Payer: MEDICARE

## 2024-02-29 ENCOUNTER — TELEPHONE (OUTPATIENT)
Dept: DIABETES SERVICES | Facility: HOSPITAL | Age: 69
End: 2024-02-29
Payer: MEDICARE

## 2024-02-29 NOTE — TELEPHONE ENCOUNTER
PT WAS CALLED BACK AS REQUESTED REG CECILLE 3 SEN ORDER WITH LILIA AND KIERA PT JOHN WITH [employer]. PT WAS PROVIDED AN UPDATE OF BOTH. I INFORMED THE PT THAT THE [employer] ARUN HAD BEEN RESENT ON 2-26-24, AND ALSO INFORMED THE PT THAT CARL HAD BEEN TRYING TO REACH HIM REGARDING HIS INSURANCE INF BEFORE THEY COULD COMP THE ORDER. PT WAS PROVIDED THE NUMBER TO [employer] AS WELL. PT STATED THAT ONE OF HIS LYUMJEV PENS WAS DEFECTED AND WAS AFRAID OF RUNNING OUT BEFORE HE RECEIVED HIS SHIPT. PT STATED HE WOULD CONTACT THE OFFICE IF HE RUNS OUT OF THE LYUMJEV WHILE WAITING ON HIS SHIPT FROM [facility]    2-29-24

## 2024-03-05 NOTE — PROGRESS NOTES
Chief Complaint  Diabetes (Follow up, med mgt, A1c eval, cgm eval )    Referred By: DO Zander Guevara presents to [facility] for diabetes medication management    History of Present Illness    Visit type:  follow-up  Diabetes type:  Type 2  Current diabetes status/concerns/issues:  He has been approved for the increase in his Trulicity but has not yet received it.  Other health concerns: No new health concerns  Current Diabetes symptoms:    Polyuria: No   Polydipsia: No   Polyphagia: No   Blurred vision: No   Excessive fatigue: No  Known Diabetes complications:  Neuropathy: Tingling; Location: Feet  Renal: Stage II mild (GFR = 60-89mL/min)  Eyes: None; Location: N/A  Amputation/Wounds: None  GI: None  Cardiovascular: Hypertension, Hyperlipidemia, CHF and Cardiomyopathy  ED: None  Other: None  Hypoglycemia:  None reported at this time  Hypoglycemia Symptoms:  No hypoglycemia at this time  Current diabetes treatment:  Trulicity 3 mg once weekly, Basaglar 45 units twice a day, and Lyumjev insulin before each meal taking 8, 10, or 12 units based on small, medium, or large amounts of carbohydrates plus adding correction for glucose levels greater than 150 mg/dL starting with 2 units and increasing by 1 unit for every 25 mg/dL thereafter.  He states he is taking 35-40 units of Lyumjev with each meal     Blood glucose device:  [facility] CGM  Blood glucose monitoring frequency:  Continuous per CGM  Blood glucose range/average:  The 14-day sensor report shows an average glucose of 199 mg/dL, with 33% in target range ( mgdL), 55% in the high range (181-250 mg/dL), 12% in the very high range (>250 mg/dL), 0% in the low range (54-70 mg/dL) and 0% in the very low range (<54 mg/dL).   Glucose Source: Device Reviewed  Diet:  Limits high carb/sweet foods, Avoids sugary drinks  Activity/Exercise:  None    Past Medical History:   Diagnosis Date    Anxiety      Arthritis     Asthma     - PT DENIED ASTHMA- NO INHALERS    Bradycardia     DEFBIRLLATOR/ PACEMAKER- MEDITRONIC- SEE'S DR. HAMEED DENIED CP/SOB    Chest pain     NO RECENT CP    CHF (congestive heart failure)     ASYMPTOMATIC    Condition not found     Hernia-Unspecified    Coronary artery disease     Deafness     Diabetes     BG RUNS AROUND 250-300 IN AM    HBP (high blood pressure)     Heart disease     High cholesterol     Kidney stones     Lung disease     Night sweats     Ringing in ear     Sleep apnea     Type 2 diabetes mellitus with autonomic neuropathy 07/16/2021     Past Surgical History:   Procedure Laterality Date    APPENDECTOMY      BACK SURGERY  2000,2010    lumbar with hardware    CARDIAC DEFIBRILLATOR PLACEMENT      MEDITRONIC    CHOLECYSTECTOMY      COLONOSCOPY N/A 06/22/2022    Procedure: COLONOSCOPY;  Surgeon: Nicholas Townsend MD;  Location: [facility] ENDOSCOPY;  Service: General;  Laterality: N/A;  DIVERTICULOSIS    CYSTOSCOPY W/ STONE MANIPULATION Right 1/5/2023    Procedure: CYSTOSCOPY KIDNEY STONE MANIPULATION/EXTRACTION;  Surgeon: Ann Mon MD;  Location: [facility] MAIN OR;  Service: Urology;  Laterality: Right;    KIDNEY STONE SURGERY      NECK SURGERY  2005    removal of bone spurs    PACEMAKER IMPLANTATION      10 YEARS AGO PLACED- MEDITRONIC    URETEROSCOPY LASER LITHOTRIPSY WITH STENT INSERTION Left 1/5/2023    Procedure: LEFT URETEROSCOPY LASER LITHOTRIPSY WITH STENT INSERTION AND RETROGRADE; RIGHT URETEROSCOPY WITH STENT INSERTION AND RETROGRADE;  Surgeon: Ann Mon MD;  Location: [facility] MAIN OR;  Service: Urology;  Laterality: Left;     Family History   Problem Relation Age of Onset    Other Mother 60        Renal Cancer    Prostate cancer Father     Colon cancer Father 70    Malig Hyperthermia Neg Hx      Social History     Socioeconomic History    Marital status:     Number of children: 2   Tobacco Use    Smoking status: Former     Current packs/day: 0.00      Average packs/day: 1.5 packs/day for 40.0 years (60.0 ttl pk-yrs)     Types: Cigarettes     Start date: 1965     Quit date:      Years since quittin.2     Passive exposure: Past    Smokeless tobacco: Never    Tobacco comments:     no second hand smoke exposure   Vaping Use    Vaping status: Never Used   Substance and Sexual Activity    Alcohol use: Never    Drug use: Never    Sexual activity: Defer     Allergies   Allergen Reactions    Codeine Nausea Only and Nausea And Vomiting       Current Outpatient Medications:     aspirin  MG tablet, Take 1 tablet by mouth Daily. PT STATED DR CHEN OFFICE INSTRUCTED TO STOP 5 DAYS PRIOR TO SURGERY, LAST DOSE PT STATED INSTRUCTED TO TAKE IS 22, Disp: , Rfl:     celecoxib (CeleBREX) 100 MG capsule, TAKE ONE CAPSULE BY MOUTH TWICE A DAY AS DIRECTED FOR 30 DAYS., Disp: 180 capsule, Rfl: 1    cholecalciferol (VITAMIN D3) 25 MCG (1000 UT) tablet, Take 2 tablets by mouth Daily., Disp: , Rfl:     Diclofenac Sodium (VOLTAREN) 1 % gel gel, APPLY 2 GRAMS TO THE AFFECTED AREA(S) BY TOPICAL ROUTE 4 TIMES PER DAY, Disp: , Rfl:     escitalopram (LEXAPRO) 20 MG tablet, TAKE ONE TABLET BY MOUTH ONCE DAILY, Disp: 90 tablet, Rfl: 1    fenofibrate (TRICOR) 145 MG tablet, TAKE ONE TABLET BY MOUTH ONCE DAILY, Disp: 90 tablet, Rfl: 1    glucose blood test strip, Use as instructed, Disp: 100 each, Rfl: 2    glucose blood test strip, OneTouch Ultra Test strips, Disp: , Rfl:     ibuprofen (ADVIL,MOTRIN) 200 MG tablet, Take 1 tablet by mouth Every 6 (Six) Hours As Needed for Mild Pain., Disp: , Rfl:     Insulin Pen Needle (Pen Needles) 32G X 5 MM misc, Use 1 each 5 (Five) Times a Day., Disp: 450 each, Rfl: 1    Lyumjev KwikPen 200 UNIT/ML solution pen-injector, Inject 40 Units under the skin into the appropriate area as directed 3 (Three) Times a Day Before Meals., Disp: 72 mL, Rfl: 4    methylcellulose, Laxative, (CITRUCEL) 500 MG tablet tablet, , Disp: , Rfl:     metoprolol  "succinate XL (TOPROL-XL) 25 MG 24 hr tablet, Take 1 tablet by mouth Daily., Disp: , Rfl:     oxyCODONE-acetaminophen (PERCOCET)  MG per tablet, Take 1 tablet by mouth Every 4 (Four) Hours As Needed., Disp: , Rfl:     pregabalin (LYRICA) 300 MG capsule, Take 1 capsule by mouth Daily., Disp: , Rfl:     psyllium (Metamucil) 0.52 g capsule, , Disp: , Rfl:     rosuvastatin (CRESTOR) 10 MG tablet, TAKE 1 TABLET BY MOUTH DAILY., Disp: 90 tablet, Rfl: 1    tamsulosin (FLOMAX) 0.4 MG capsule 24 hr capsule, Take 2 capsules by mouth Daily., Disp: 180 capsule, Rfl: 4    valsartan (DIOVAN) 160 MG tablet, Take 1 tablet by mouth Daily., Disp: 90 tablet, Rfl: 1    vitamin B-12 (CYANOCOBALAMIN) 1000 MCG tablet, Take 1,200 mcg by mouth Daily., Disp: , Rfl:     vitamin E 100 UNIT capsule, Take 1 capsule by mouth Daily., Disp: , Rfl:     Zinc 50 MG capsule, Take  by mouth., Disp: , Rfl:     Insulin Glargine (BASAGLAR KWIKPEN) 100 UNIT/ML injection pen, Inject 90 Units under the skin into the appropriate area as directed Daily for 180 days., Disp: 81 mL, Rfl: 1    Objective     Vitals:    03/07/24 1132   BP: 130/90   BP Location: Left arm   Patient Position: Sitting   Cuff Size: Adult   Pulse: 115   SpO2: 98%   Weight: 121 kg (266 lb)   Height: 188 cm (74\")   PainSc:   5     Body mass index is 34.15 kg/m².    Physical Exam  Constitutional:       Appearance: Normal appearance. He is obese.      Comments: Obesity (BMI 30 - 39.9) Pt Current BMI = 34.15    HENT:      Head: Normocephalic and atraumatic.      Right Ear: External ear normal.      Left Ear: External ear normal.      Nose: Nose normal.   Eyes:      Extraocular Movements: Extraocular movements intact.      Conjunctiva/sclera: Conjunctivae normal.   Pulmonary:      Effort: Pulmonary effort is normal.   Musculoskeletal:         General: Normal range of motion.      Cervical back: Normal range of motion.   Skin:     General: Skin is warm and dry.   Neurological:      General: " No focal deficit present.      Mental Status: He is alert and oriented to person, place, and time. Mental status is at baseline.   Psychiatric:         Mood and Affect: Mood normal.         Behavior: Behavior normal.         Thought Content: Thought content normal.         Judgment: Judgment normal.             Result Review :   The following data was reviewed by: ANGLE Mcdonnell on 03/07/2024:    Most Recent A1C          3/7/2024    11:39   HGBA1C Most Recent   Hemoglobin A1C 8.6        A1C Last 3 Results          10/2/2023    08:09 12/5/2023    07:38 3/7/2024    11:39   HGBA1C Last 3 Results   Hemoglobin A1C 7.30  7.5  8.6      A1c collected in the office today is 8.6%, indicating Uncontrolled Type II diabetes.  This result is up from the prior result of 7.5% collected on 12/5/23yes    Glucose   Date Value Ref Range Status   03/07/2024 231 (H) 70 - 99 mg/dL Final     Comment:     Serial Number: [id number]:  [id number]     Point of care glucose in the office today is  elevated at 231 mg/dL              Assessment: He is having increase in his A1c.  The patient has not yet received with the increased dose of the Trulicity 4.5 mg from the patient assistance program.  He is also receiving his Basaglar and Lyumjev from the assistance program.  The patient developed some issues with his sensor not working yesterday.  We did try to assist the patient with his device during today's visit.  He was urged to contact the  for further directions to see if the issues can be resolved      Diagnoses and all orders for this visit:    1. Uncontrolled type 2 diabetes mellitus with hyperglycemia (Primary)  -     POC Glycosylated Hemoglobin (Hb A1C)    2. Type 2 diabetes mellitus with diabetic neuropathy, with long-term current use of insulin    3. Type 2 diabetes mellitus with stage 2 chronic kidney disease, with long-term current use of insulin    Other orders  -     POC Glucose        Plan: We elected to  make no changes to his treatment plan.  We will wait to see the patient has a good response to the increase in the Trulicity when he receives his next supply.  We will upgrade the patient to the naman 3 device.    The patient will monitor his blood glucose levels using his CGM.  If he develops problematic hyperglycemia or hypoglycemia or adverse drug reactions, he will contact the office for further instructions.        Follow Up     Return in about 3 months (around 6/7/2024) for Medication Management, CGM Follow-up.    Patient was given instructions and counseling regarding his condition or for health maintenance advice. Please see specific information pulled into the AVS if appropriate.     Suzanna Agosto, APRN  03/07/2024      Dictated Utilizing Dragon Dictation.  Please note that portions of this note were completed with a voice recognition program.  Part of this note may be an electronic transcription/translation of spoken language to printed text using the Dragon Dictation System.

## 2024-03-07 ENCOUNTER — OFFICE VISIT (OUTPATIENT)
Dept: DIABETES SERVICES | Facility: HOSPITAL | Age: 69
End: 2024-03-07
Payer: MEDICARE

## 2024-03-07 ENCOUNTER — TELEPHONE (OUTPATIENT)
Dept: DIABETES SERVICES | Facility: HOSPITAL | Age: 69
End: 2024-03-07

## 2024-03-07 VITALS
OXYGEN SATURATION: 98 % | HEART RATE: 115 BPM | BODY MASS INDEX: 34.14 KG/M2 | SYSTOLIC BLOOD PRESSURE: 130 MMHG | DIASTOLIC BLOOD PRESSURE: 90 MMHG | HEIGHT: 74 IN | WEIGHT: 266 LBS

## 2024-03-07 DIAGNOSIS — N18.2 TYPE 2 DIABETES MELLITUS WITH STAGE 2 CHRONIC KIDNEY DISEASE, WITH LONG-TERM CURRENT USE OF INSULIN: ICD-10-CM

## 2024-03-07 DIAGNOSIS — E11.65 UNCONTROLLED TYPE 2 DIABETES MELLITUS WITH HYPERGLYCEMIA: Primary | ICD-10-CM

## 2024-03-07 DIAGNOSIS — Z79.4 TYPE 2 DIABETES MELLITUS WITH DIABETIC NEUROPATHY, WITH LONG-TERM CURRENT USE OF INSULIN: ICD-10-CM

## 2024-03-07 DIAGNOSIS — Z79.4 TYPE 2 DIABETES MELLITUS WITH STAGE 2 CHRONIC KIDNEY DISEASE, WITH LONG-TERM CURRENT USE OF INSULIN: ICD-10-CM

## 2024-03-07 DIAGNOSIS — E11.22 TYPE 2 DIABETES MELLITUS WITH STAGE 2 CHRONIC KIDNEY DISEASE, WITH LONG-TERM CURRENT USE OF INSULIN: ICD-10-CM

## 2024-03-07 DIAGNOSIS — E11.40 TYPE 2 DIABETES MELLITUS WITH DIABETIC NEUROPATHY, WITH LONG-TERM CURRENT USE OF INSULIN: ICD-10-CM

## 2024-03-07 LAB
EXPIRATION DATE: ABNORMAL
GLUCOSE BLDC GLUCOMTR-MCNC: 231 MG/DL (ref 70–99)
HBA1C MFR BLD: 8.6 % (ref 4.5–5.7)
Lab: ABNORMAL

## 2024-03-07 PROCEDURE — 95251 CONT GLUC MNTR ANALYSIS I&R: CPT | Performed by: NURSE PRACTITIONER

## 2024-03-07 PROCEDURE — 99213 OFFICE O/P EST LOW 20 MIN: CPT | Performed by: NURSE PRACTITIONER

## 2024-03-07 PROCEDURE — 3080F DIAST BP >= 90 MM HG: CPT | Performed by: NURSE PRACTITIONER

## 2024-03-07 PROCEDURE — G0463 HOSPITAL OUTPT CLINIC VISIT: HCPCS | Performed by: NURSE PRACTITIONER

## 2024-03-07 PROCEDURE — 82948 REAGENT STRIP/BLOOD GLUCOSE: CPT | Performed by: NURSE PRACTITIONER

## 2024-03-07 PROCEDURE — 1159F MED LIST DOCD IN RCRD: CPT | Performed by: NURSE PRACTITIONER

## 2024-03-07 PROCEDURE — 1160F RVW MEDS BY RX/DR IN RCRD: CPT | Performed by: NURSE PRACTITIONER

## 2024-03-07 PROCEDURE — 3075F SYST BP GE 130 - 139MM HG: CPT | Performed by: NURSE PRACTITIONER

## 2024-03-07 PROCEDURE — 3052F HG A1C>EQUAL 8.0%<EQUAL 9.0%: CPT | Performed by: NURSE PRACTITIONER

## 2024-03-07 NOTE — TELEPHONE ENCOUNTER
[employer] Patel 3 System, Continuous Glucose Monitoring Package    Patel 3 Sensor, change every 14 days - CG[facility]    Patel 3 [place] - [facility]    Quantity  1  Prescription Details  Directions for CGM use - Use per  directions  Supplier  [facility]

## 2024-03-08 RX ORDER — FENOFIBRATE 145 MG/1
TABLET, COATED ORAL
Qty: 90 TABLET | Refills: 1 | Status: SHIPPED | OUTPATIENT
Start: 2024-03-08

## 2024-03-15 ENCOUNTER — TELEPHONE (OUTPATIENT)
Dept: DIABETES SERVICES | Facility: HOSPITAL | Age: 69
End: 2024-03-15
Payer: MEDICARE

## 2024-03-15 NOTE — TELEPHONE ENCOUNTER
Pt reports to having higher blood sugars than he normally has and is wanting to know what you would like him to do.

## 2024-03-15 NOTE — TELEPHONE ENCOUNTER
"      Hub staff attempted to follow warm transfer process and was unsuccessful     Caller: Chaparro Weber \"Lane\"    Relationship to patient: Self    Best call back number: [phone]    Patient is needing: PT NEEDING RETURNING PHONE CALL FROM MA, PT IS HAVINGA HARD TIME KEEPING HIS SUGAR DOWN, PT SAID HE NEEDS A GAME PLAN         "

## 2024-03-15 NOTE — TELEPHONE ENCOUNTER
Patient was called but there was no answer.  Voicemail message was left questioning the patient if he has any sort of infection that might be the source of his higher glucose levels.  He was advised he can increase his long-acting insulin by 2 to 3 units at both doses to see if this will bring the glucose levels down.  He can call back to the office if necessary.

## 2024-03-21 RX ORDER — CELECOXIB 100 MG/1
CAPSULE ORAL
Qty: 180 CAPSULE | Refills: 1 | Status: SHIPPED | OUTPATIENT
Start: 2024-03-21

## 2024-04-01 ENCOUNTER — OFFICE VISIT (OUTPATIENT)
Dept: FAMILY MEDICINE CLINIC | Facility: CLINIC | Age: 69
End: 2024-04-01
Payer: MEDICARE

## 2024-04-01 VITALS
HEART RATE: 85 BPM | OXYGEN SATURATION: 93 % | TEMPERATURE: 98.6 F | HEIGHT: 74 IN | BODY MASS INDEX: 34.75 KG/M2 | SYSTOLIC BLOOD PRESSURE: 93 MMHG | WEIGHT: 270.8 LBS | DIASTOLIC BLOOD PRESSURE: 59 MMHG

## 2024-04-01 DIAGNOSIS — G89.29 CHRONIC BILATERAL LOW BACK PAIN WITHOUT SCIATICA: ICD-10-CM

## 2024-04-01 DIAGNOSIS — M62.838 MUSCLE SPASM: ICD-10-CM

## 2024-04-01 DIAGNOSIS — I10 ESSENTIAL HYPERTENSION: Chronic | ICD-10-CM

## 2024-04-01 DIAGNOSIS — E66.09 CLASS 1 OBESITY DUE TO EXCESS CALORIES WITH SERIOUS COMORBIDITY AND BODY MASS INDEX (BMI) OF 34.0 TO 34.9 IN ADULT: ICD-10-CM

## 2024-04-01 DIAGNOSIS — Z00.00 MEDICARE ANNUAL WELLNESS VISIT, SUBSEQUENT: Primary | ICD-10-CM

## 2024-04-01 DIAGNOSIS — M54.50 CHRONIC BILATERAL LOW BACK PAIN WITHOUT SCIATICA: ICD-10-CM

## 2024-04-01 DIAGNOSIS — Z79.4 TYPE 2 DIABETES MELLITUS WITH DIABETIC AUTONOMIC NEUROPATHY, WITH LONG-TERM CURRENT USE OF INSULIN: Chronic | ICD-10-CM

## 2024-04-01 DIAGNOSIS — E11.43 TYPE 2 DIABETES MELLITUS WITH DIABETIC AUTONOMIC NEUROPATHY, WITH LONG-TERM CURRENT USE OF INSULIN: Chronic | ICD-10-CM

## 2024-04-01 PROCEDURE — 3078F DIAST BP <80 MM HG: CPT | Performed by: FAMILY MEDICINE

## 2024-04-01 PROCEDURE — 1170F FXNL STATUS ASSESSED: CPT | Performed by: FAMILY MEDICINE

## 2024-04-01 PROCEDURE — 1159F MED LIST DOCD IN RCRD: CPT | Performed by: FAMILY MEDICINE

## 2024-04-01 PROCEDURE — 3052F HG A1C>EQUAL 8.0%<EQUAL 9.0%: CPT | Performed by: FAMILY MEDICINE

## 2024-04-01 PROCEDURE — 3074F SYST BP LT 130 MM HG: CPT | Performed by: FAMILY MEDICINE

## 2024-04-01 PROCEDURE — G0439 PPPS, SUBSEQ VISIT: HCPCS | Performed by: FAMILY MEDICINE

## 2024-04-01 PROCEDURE — 1160F RVW MEDS BY RX/DR IN RCRD: CPT | Performed by: FAMILY MEDICINE

## 2024-04-01 PROCEDURE — 99214 OFFICE O/P EST MOD 30 MIN: CPT | Performed by: FAMILY MEDICINE

## 2024-04-01 RX ORDER — CYCLOBENZAPRINE HCL 5 MG
TABLET ORAL
Qty: 90 TABLET | Refills: 1 | Status: SHIPPED | OUTPATIENT
Start: 2024-04-01

## 2024-04-01 NOTE — ASSESSMENT & PLAN NOTE
Patient's (Body mass index is 34.77 kg/m².) indicates that they are obese (BMI >30) with health conditions that include hypertension and dyslipidemias . Weight is worsening. BMI  is above average; BMI management plan is completed. We discussed low calorie, low carb based diet program, portion control, and increasing exercise.

## 2024-04-01 NOTE — ASSESSMENT & PLAN NOTE
Diabetes is stable.   Continue current treatment regimen.  Recommended an ADA diet.  Diabetes will be reassessed in 6 months

## 2024-04-01 NOTE — ASSESSMENT & PLAN NOTE
His blood pressure is low today. He has stopped his Diovan 160 mg daily. If his blood pressure stays low his metoprolol will be reduced.

## 2024-04-01 NOTE — ASSESSMENT & PLAN NOTE
His pain is not improved as he would hope. He was given Flexeril today to be tried to see if it has helped.

## 2024-04-01 NOTE — PROGRESS NOTES
The ABCs of the Annual Wellness Visit  Subsequent Medicare Wellness Visit    Subjective    Chaparro Weber is a 68 y.o. male who presents for a Subsequent Medicare Wellness Visit.    The following portions of the patient's history were reviewed and   updated as appropriate: allergies, current medications, past family history, past medical history, past social history, past surgical history, and problem list.    Compared to one year ago, the patient feels his physical   health is the same.    Compared to one year ago, the patient feels his mental   health is the same.    Recent Hospitalizations:  He was not admitted to the hospital during the last year.       Current Medical Providers:  Patient Care Team:  Charlotte Kulkarni DO as PCP - General (Family Medicine)  Daniela Hay APRN as Nurse Practitioner (Nurse Practitioner)  Suzanna Agosto APRN as Nurse Practitioner (Endocrinology)    Outpatient Medications Prior to Visit   Medication Sig Dispense Refill    aspirin  MG tablet Take 1 tablet by mouth Daily. PT STATED DR CHEN OFFICE INSTRUCTED TO STOP 5 DAYS PRIOR TO SURGERY, LAST DOSE PT STATED INSTRUCTED TO TAKE IS 12-30-22      celecoxib (CeleBREX) 100 MG capsule TAKE ONE CAPSULE BY MOUTH TWICE A DAY AS DIRECTED FOR 30 DAYS. 180 capsule 1    cholecalciferol (VITAMIN D3) 25 MCG (1000 UT) tablet Take 2 tablets by mouth Daily.      escitalopram (LEXAPRO) 20 MG tablet TAKE ONE TABLET BY MOUTH ONCE DAILY 90 tablet 1    fenofibrate (TRICOR) 145 MG tablet TAKE ONE TABLET BY MOUTH ONCE DAILY 90 tablet 1    glucose blood test strip Use as instructed 100 each 2    glucose blood test strip OneTouch Ultra Test strips      ibuprofen (ADVIL,MOTRIN) 200 MG tablet Take 1 tablet by mouth Every 6 (Six) Hours As Needed for Mild Pain.      Insulin Glargine (BASAGLAR KWIKPEN) 100 UNIT/ML injection pen Inject 90 Units under the skin into the appropriate area as directed Daily for 180 days. 81 mL 1    Insulin Pen Needle (Pen  Needles) 32G X 5 MM misc Use 1 each 5 (Five) Times a Day. 450 each 1    Lyumjev KwikPen 200 UNIT/ML solution pen-injector Inject 40 Units under the skin into the appropriate area as directed 3 (Three) Times a Day Before Meals. 72 mL 4    methylcellulose, Laxative, (CITRUCEL) 500 MG tablet tablet       metoprolol succinate XL (TOPROL-XL) 25 MG 24 hr tablet Take 1 tablet by mouth Daily.      oxyCODONE-acetaminophen (PERCOCET)  MG per tablet Take 1 tablet by mouth Every 4 (Four) Hours As Needed.      pregabalin (LYRICA) 300 MG capsule Take 1 capsule by mouth Daily.      psyllium (Metamucil) 0.52 g capsule       rosuvastatin (CRESTOR) 10 MG tablet TAKE 1 TABLET BY MOUTH DAILY. 90 tablet 1    tamsulosin (FLOMAX) 0.4 MG capsule 24 hr capsule Take 2 capsules by mouth Daily. 180 capsule 4    vitamin B-12 (CYANOCOBALAMIN) 1000 MCG tablet Take 1,200 mcg by mouth Daily.      vitamin E 100 UNIT capsule Take 1 capsule by mouth Daily.      Zinc 50 MG capsule Take  by mouth.      valsartan (DIOVAN) 160 MG tablet Take 1 tablet by mouth Daily. 90 tablet 1    Diclofenac Sodium (VOLTAREN) 1 % gel gel APPLY 2 GRAMS TO THE AFFECTED AREA(S) BY TOPICAL ROUTE 4 TIMES PER DAY (Patient not taking: Reported on 4/1/2024)       No facility-administered medications prior to visit.       Opioid medication/s are on active medication list.  and I have evaluated his active treatment plan and pain score trends (see table).  There were no vitals filed for this visit.  I have reviewed the chart for potential of high risk medication and harmful drug interactions in the elderly.          Aspirin is on active medication list. Aspirin use is indicated based on review of current medical condition/s. Pros and cons of this therapy have been discussed today. Benefits of this medication outweigh potential harm.  Patient has been encouraged to continue taking this medication.  .      Patient Active Problem List   Diagnosis    Acquired polycythemia     "Anxiety    Arthritis    Asthma    COPD (chronic obstructive pulmonary disease)    Diabetic peripheral neuropathy    NICM (nonischemic cardiomyopathy)    Left bundle branch block    Congestive heart failure    Kidney stone    Hyperlipidemia    GERD (gastroesophageal reflux disease)    Essential hypertension    Heart disease    Type 2 diabetes mellitus with autonomic neuropathy    Class 1 obesity due to excess calories with serious comorbidity and body mass index (BMI) of 34.0 to 34.9 in adult    Medicare annual wellness visit, subsequent    Osteoarthritis of knee    Chronic bilateral low back pain without sciatica     Advance Care Planning   Advance Care Planning     Advance Directive is not on file.  ACP discussion was held with the patient during this visit. Patient has an advance directive (not in EMR), copy requested.     Objective    Vitals:    24 0945   BP: 93/59   BP Location: Left arm   Patient Position: Sitting   Cuff Size: Large Adult   Pulse: 85   Temp: 98.6 °F (37 °C)   SpO2: 93%   Weight: 123 kg (270 lb 12.8 oz)   Height: 188 cm (74\")     Estimated body mass index is 34.77 kg/m² as calculated from the following:    Height as of this encounter: 188 cm (74\").    Weight as of this encounter: 123 kg (270 lb 12.8 oz).           Does the patient have evidence of cognitive impairment? No    Lab Results   Component Value Date    HGBA1C 8.6 (A) 2024        HEALTH RISK ASSESSMENT    Smoking Status:  Social History     Tobacco Use   Smoking Status Former    Current packs/day: 0.00    Average packs/day: 1.5 packs/day for 40.0 years (60.0 ttl pk-yrs)    Types: Cigarettes    Start date: 1965    Quit date:     Years since quittin.2    Passive exposure: Past   Smokeless Tobacco Never   Tobacco Comments    no second hand smoke exposure     Alcohol Consumption:  Social History     Substance and Sexual Activity   Alcohol Use Never     Fall Risk Screen:    STEADI Fall Risk Assessment was completed, " and patient is at HIGH risk for falls. Assessment completed on:2024    Depression Screenin/1/2024     9:53 AM   PHQ-2/PHQ-9 Depression Screening   Little Interest or Pleasure in Doing Things 1-->several days   Feeling Down, Depressed or Hopeless 1-->several days   Trouble Falling or Staying Asleep, or Sleeping Too Much 1-->several days   Feeling Tired or Having Little Energy 1-->several days   Poor Appetite or Overeating 0-->not at all   Feeling Bad about Yourself - or that You are a Failure or Have Let Yourself or Your Family Down 0-->not at all   Trouble Concentrating on Things, Such as Reading the Newspaper or Watching Television 0-->not at all   Moving or Speaking So Slowly that Other People Could Have Noticed? Or the Opposite - Being So Fidgety 0-->not at all   Thoughts that You Would be Better Off Dead or of Hurting Yourself in Some Way 0-->not at all   PHQ-9: Brief Depression Severity Measure Score 4   If You Checked Off Any Problems, How Difficult Have These Problems Made It For You to Do Your Work, Take Care of Things at Home, or Get Along with Other People? not difficult at all       Health Habits and Functional and Cognitive Screenin/1/2024    10:00 AM   Functional & Cognitive Status   Do you have difficulty preparing food and eating? No   Do you have difficulty bathing yourself, getting dressed or grooming yourself? No   Do you have difficulty using the toilet? No   Do you have difficulty moving around from place to place? Yes   Do you have trouble with steps or getting out of a bed or a chair? No   Current Diet Well Balanced Diet   Dental Exam Up to date   Eye Exam Not up to date   Exercise (times per week) 5 times per week   Current Exercises Include Walking   Do you need help using the phone?  No   Are you deaf or do you have serious difficulty hearing?  No   Do you need help to go to places out of walking distance? Yes   Do you need help shopping? No   Do you need help preparing  meals?  No   Do you need help with housework?  No   Do you need help with laundry? No   Do you need help taking your medications? No   Do you need help managing money? No   Do you ever drive or ride in a car without wearing a seat belt? No   Have you felt unusual stress, anger or loneliness in the last month? No   Who do you live with? Spouse   If you need help, do you have trouble finding someone available to you? No   Have you been bothered in the last four weeks by sexual problems? No   Do you have difficulty concentrating, remembering or making decisions? Yes       Age-appropriate Screening Schedule:  Refer to the list below for future screening recommendations based on patient's age, sex and/or medical conditions. Orders for these recommended tests are listed in the plan section. The patient has been provided with a written plan.    Health Maintenance   Topic Date Due    COVID-19 Vaccine (1 - 2023-24 season) 05/01/2024 (Originally 9/1/2023)    RSV Vaccine - Adults (1 - 1-dose 60+ series) 05/01/2024 (Originally 8/26/2017)    HEPATITIS C SCREENING  05/08/2024 (Originally 7/7/2021)    Pneumococcal Vaccine 65+ (1 of 2 - PCV) 06/05/2024 (Originally 8/26/1961)    ZOSTER VACCINE (1 of 2) 04/01/2025 (Originally 8/26/2007)    URINE MICROALBUMIN  06/14/2024    INFLUENZA VACCINE  08/01/2024    HEMOGLOBIN A1C  09/07/2024    DIABETIC FOOT EXAM  10/02/2024    LIPID PANEL  10/02/2024    DIABETIC EYE EXAM  03/07/2025    ANNUAL WELLNESS VISIT  04/01/2025    BMI FOLLOWUP  04/01/2025    COLORECTAL CANCER SCREENING  06/22/2025    TDAP/TD VACCINES (2 - Td or Tdap) 07/16/2029    AAA SCREEN (ONE-TIME)  Completed                  CMS Preventative Services Quick Reference  Risk Factors Identified During Encounter  Fall Risk-High or Moderate: Discussed Fall Prevention in the home  The above risks/problems have been discussed with the patient.  Pertinent information has been shared with the patient in the After Visit Summary.  An After  "Visit Summary and PPPS were made available to the patient.    Follow Up:   Next Medicare Wellness visit to be scheduled in 1 year.       Additional E&M Note during same encounter follows:  Patient has multiple medical problems which are significant and separately identifiable that require additional work above and beyond the Medicare Wellness Visit.      Chief Complaint  Medicare Wellness-subsequent    Subjective        HPI  Chaparro Weber is also being seen today for back pain, obesity,     Review of Systems   HENT:  Negative for trouble swallowing.    Eyes:  Negative for visual disturbance.   Respiratory:  Negative for apnea.    Cardiovascular:  Negative for chest pain.   Gastrointestinal:  Negative for blood in stool.   Endocrine: Negative for polyphagia.   Genitourinary:  Negative for dysuria.   Musculoskeletal:  Positive for myalgias.        Back muscle spasm     Skin:  Negative for color change.   Allergic/Immunologic: Negative for immunocompromised state.   Neurological:  Negative for seizures.   Hematological:  Negative for adenopathy.   Psychiatric/Behavioral:  Negative for behavioral problems.        Objective   Vital Signs:  BP 93/59 (BP Location: Left arm, Patient Position: Sitting, Cuff Size: Large Adult)   Pulse 85   Temp 98.6 °F (37 °C)   Ht 188 cm (74\")   Wt 123 kg (270 lb 12.8 oz)   SpO2 93%   BMI 34.77 kg/m²     Physical Exam  Vitals reviewed.   Constitutional:       Appearance: He is well-developed. He is obese.   HENT:      Head: Normocephalic and atraumatic.      Right Ear: External ear normal.      Left Ear: External ear normal.      Mouth/Throat:      Pharynx: No oropharyngeal exudate.   Eyes:      Conjunctiva/sclera: Conjunctivae normal.      Pupils: Pupils are equal, round, and reactive to light.   Neck:      Vascular: No carotid bruit.   Cardiovascular:      Rate and Rhythm: Normal rate and regular rhythm.      Heart sounds: No murmur heard.     No friction rub. No gallop. "   Pulmonary:      Effort: Pulmonary effort is normal.      Breath sounds: Normal breath sounds. No wheezing or rhonchi.   Abdominal:      General: There is no distension.   Skin:     General: Skin is warm and dry.   Neurological:      Mental Status: He is alert and oriented to person, place, and time.      Cranial Nerves: No cranial nerve deficit.      Motor: No weakness.   Psychiatric:         Mood and Affect: Mood and affect normal.         Behavior: Behavior normal.         Thought Content: Thought content normal.         Judgment: Judgment normal.            CMP          6/14/2023    09:30 6/27/2023    09:57 6/27/2023    11:02 6/27/2023    12:03 6/27/2023    12:59 6/27/2023    13:57 6/27/2023    15:44 10/2/2023    08:09   CMP   Glucose 136        139    Glucose  204     203     210     198     191     196        BUN 25        19    Creatinine 1.07        1.03    EGFR 77.0        80.1    Sodium 142  136     135     136     137     137     137     140    Potassium 4.9  5.0     4.5     4.3     4.0     4.0     4.3     4.3    Chloride 103        101    Calcium 9.1        9.1    Total Protein 6.4        6.9    Albumin 4.1        4.2    Globulin 2.3        2.7    Total Bilirubin 0.3        0.3    Alkaline Phosphatase 39        61    AST (SGOT) 33        47    ALT (SGPT) 34        43    Albumin/Globulin Ratio 1.8        1.6    BUN/Creatinine Ratio 23.4        18.4    Anion Gap 8.4        8.7       Details          This result is from an external source.             CBC          6/29/2023    02:40 6/30/2023    03:22 10/2/2023    08:09   CBC   WBC 8.47     6.02     3.56    RBC 2.99     2.84     4.80    Hemoglobin 8.5     8.2     12.8    Hematocrit 26.3     25.2     41.3    MCV 88.0     88.7     86.0    MCH 28.4     28.9     26.7    MCHC 32.3     32.5     31.0    RDW 13.4     13.2     13.7    Platelets 54     57     107       Details          This result is from an external source.             Lipid Panel          5/8/2023     09:03 10/2/2023    08:09   Lipid Panel   Total Cholesterol 153  127    Triglycerides 197  225    HDL Cholesterol 38  25    VLDL Cholesterol 33  37    LDL Cholesterol  82  65    LDL/HDL Ratio 1.99  2.28                   Assessment and Plan   Diagnoses and all orders for this visit:    1. Medicare annual wellness visit, subsequent (Primary)    2. Type 2 diabetes mellitus with diabetic autonomic neuropathy, with long-term current use of insulin  Assessment & Plan:  Diabetes is stable.   Continue current treatment regimen.  Recommended an ADA diet.  Diabetes will be reassessed in 6 months      3. Essential hypertension  Assessment & Plan:  His blood pressure is low today. He has stopped his Diovan 160 mg daily. If his blood pressure stays low his metoprolol will be reduced.       4. Class 1 obesity due to excess calories with serious comorbidity and body mass index (BMI) of 34.0 to 34.9 in adult  Assessment & Plan:  Patient's (Body mass index is 34.77 kg/m².) indicates that they are obese (BMI >30) with health conditions that include hypertension and dyslipidemias . Weight is worsening. BMI  is above average; BMI management plan is completed. We discussed low calorie, low carb based diet program, portion control, and increasing exercise.       5. Chronic bilateral low back pain without sciatica  Assessment & Plan:  His pain is not improved as he would hope. He was given Flexeril today to be tried to see if it has helped.       6. Muscle spasm  -     cyclobenzaprine (FLEXERIL) 5 MG tablet; Take 1-2 tablets prn tid for muscle spasm  Dispense: 90 tablet; Refill: 1             Follow Up   Return in about 6 months (around 10/1/2024).  Patient was given instructions and counseling regarding his condition or for health maintenance advice. Please see specific information pulled into the AVS if appropriate.

## 2024-04-23 RX ORDER — ROSUVASTATIN CALCIUM 10 MG/1
10 TABLET, COATED ORAL DAILY
Qty: 90 TABLET | Refills: 1 | Status: SHIPPED | OUTPATIENT
Start: 2024-04-23

## 2024-05-29 ENCOUNTER — TELEPHONE (OUTPATIENT)
Dept: DIABETES SERVICES | Facility: HOSPITAL | Age: 69
End: 2024-05-29
Payer: MEDICARE

## 2024-05-29 NOTE — TELEPHONE ENCOUNTER
Hub staff attempted to follow warm transfer process and was unsuccessful     Caller: [facility]    Relationship to patient: Other    Best call back number: [phone]    Patient is needing: [facility] WILL BE FAXING OVER A PRESCRIPTION FORM AND WANTED US TO BE ON THE LOOKOUT FOR THE FORM

## 2024-05-30 NOTE — TELEPHONE ENCOUNTER
Caller: [facility]    Relationship: Other    Best call back number: [phone]    What form or medical record are you requesting: FORM FOR THE FREESTYLE CECILLE 3 SENSORS    Who is requesting this form or medical record from you: ANGEL/ RANDI    How would you like to receive the form or medical records (pick-up, mail, fax):   If fax, what is the fax number: [phone]  Timeframe paperwork needed: AS SOON AS POSSIBLE    Additional notes: ANGEL NEEDS THIS FORM FILLED OUT AND A PRIOR AUTH IS NEEDED FOR THE FREESTYLE CECILLE 3 SENSORS.

## 2024-06-03 ENCOUNTER — TELEPHONE (OUTPATIENT)
Dept: DIABETES SERVICES | Facility: HOSPITAL | Age: 69
End: 2024-06-03
Payer: MEDICARE

## 2024-06-03 RX ORDER — ESCITALOPRAM OXALATE 20 MG/1
TABLET ORAL
Qty: 90 TABLET | Refills: 1 | Status: SHIPPED | OUTPATIENT
Start: 2024-06-03 | End: 2024-06-03

## 2024-06-03 RX ORDER — ESCITALOPRAM OXALATE 20 MG/1
TABLET ORAL
Qty: 90 TABLET | Refills: 1 | Status: SHIPPED | OUTPATIENT
Start: 2024-06-03

## 2024-06-03 NOTE — TELEPHONE ENCOUNTER
I called and spoke with the pt that I received the paperwork from the insurance company and that I am working on. For the naman 3 sensors.

## 2024-06-03 NOTE — TELEPHONE ENCOUNTER
I called and spoke with the insurance company. They are faxing over paperwork that is needing completed right now so that I can get it done and fax it back.

## 2024-06-13 ENCOUNTER — OFFICE VISIT (OUTPATIENT)
Dept: DIABETES SERVICES | Facility: HOSPITAL | Age: 69
End: 2024-06-13
Payer: MEDICARE

## 2024-06-13 VITALS
OXYGEN SATURATION: 90 % | DIASTOLIC BLOOD PRESSURE: 75 MMHG | WEIGHT: 266 LBS | SYSTOLIC BLOOD PRESSURE: 135 MMHG | HEIGHT: 74 IN | BODY MASS INDEX: 34.14 KG/M2 | HEART RATE: 83 BPM

## 2024-06-13 DIAGNOSIS — E11.65 UNCONTROLLED TYPE 2 DIABETES MELLITUS WITH HYPERGLYCEMIA: Primary | ICD-10-CM

## 2024-06-13 DIAGNOSIS — Z79.4 TYPE 2 DIABETES MELLITUS WITH STAGE 2 CHRONIC KIDNEY DISEASE, WITH LONG-TERM CURRENT USE OF INSULIN: ICD-10-CM

## 2024-06-13 DIAGNOSIS — E11.40 TYPE 2 DIABETES MELLITUS WITH DIABETIC NEUROPATHY, WITH LONG-TERM CURRENT USE OF INSULIN: ICD-10-CM

## 2024-06-13 DIAGNOSIS — E11.22 TYPE 2 DIABETES MELLITUS WITH STAGE 2 CHRONIC KIDNEY DISEASE, WITH LONG-TERM CURRENT USE OF INSULIN: ICD-10-CM

## 2024-06-13 DIAGNOSIS — Z79.4 TYPE 2 DIABETES MELLITUS WITH DIABETIC NEUROPATHY, WITH LONG-TERM CURRENT USE OF INSULIN: ICD-10-CM

## 2024-06-13 DIAGNOSIS — N18.2 TYPE 2 DIABETES MELLITUS WITH STAGE 2 CHRONIC KIDNEY DISEASE, WITH LONG-TERM CURRENT USE OF INSULIN: ICD-10-CM

## 2024-06-13 DIAGNOSIS — Z97.8 USES SELF-APPLIED CONTINUOUS GLUCOSE MONITORING DEVICE: ICD-10-CM

## 2024-06-13 LAB
EXPIRATION DATE: ABNORMAL
GLUCOSE BLDC GLUCOMTR-MCNC: 245 MG/DL (ref 70–99)
HBA1C MFR BLD: 8.3 % (ref 4.5–5.7)
Lab: ABNORMAL

## 2024-06-13 PROCEDURE — 1159F MED LIST DOCD IN RCRD: CPT | Performed by: NURSE PRACTITIONER

## 2024-06-13 PROCEDURE — 3078F DIAST BP <80 MM HG: CPT | Performed by: NURSE PRACTITIONER

## 2024-06-13 PROCEDURE — 3052F HG A1C>EQUAL 8.0%<EQUAL 9.0%: CPT | Performed by: NURSE PRACTITIONER

## 2024-06-13 PROCEDURE — 99214 OFFICE O/P EST MOD 30 MIN: CPT | Performed by: NURSE PRACTITIONER

## 2024-06-13 PROCEDURE — 95251 CONT GLUC MNTR ANALYSIS I&R: CPT | Performed by: NURSE PRACTITIONER

## 2024-06-13 PROCEDURE — 82948 REAGENT STRIP/BLOOD GLUCOSE: CPT | Performed by: NURSE PRACTITIONER

## 2024-06-13 PROCEDURE — 3075F SYST BP GE 130 - 139MM HG: CPT | Performed by: NURSE PRACTITIONER

## 2024-06-13 PROCEDURE — G0463 HOSPITAL OUTPT CLINIC VISIT: HCPCS | Performed by: NURSE PRACTITIONER

## 2024-06-13 PROCEDURE — 83036 HEMOGLOBIN GLYCOSYLATED A1C: CPT | Performed by: NURSE PRACTITIONER

## 2024-06-13 PROCEDURE — 1160F RVW MEDS BY RX/DR IN RCRD: CPT | Performed by: NURSE PRACTITIONER

## 2024-06-13 RX ORDER — BLOOD SUGAR DIAGNOSTIC
1 STRIP MISCELLANEOUS
Qty: 450 EACH | Refills: 1 | Status: CANCELLED | OUTPATIENT
Start: 2024-06-13

## 2024-06-13 NOTE — PROGRESS NOTES
Chief Complaint  Diabetes (Follow up, med mgt, A1c eval, cgm eval )    Referred By: DO Zander Guevara presents to [facility] for diabetes medication management    History of Present Illness    Visit type:  follow-up  Diabetes type:  Type 2  Current diabetes status/concerns/issues:  He states his sugars are going up and up  Other health concerns: No new health concerns  Current Diabetes symptoms:    Polyuria: No   Polydipsia: No   Polyphagia: No   Blurred vision: No   Excessive fatigue: No  Known Diabetes complications:  Neuropathy: Tingling; Location: Feet  Renal: Stage II mild (GFR = 60-89mL/min)  Eyes: No current eye exam available in record; Location: N/A  Amputation/Wounds: None  GI: None  Cardiovascular: Hypertension, Hyperlipidemia, CHF and Cardiomyopathy  ED: None  Other: None  Hypoglycemia:  None reported at this time  Hypoglycemia Symptoms:  No hypoglycemia at this time  Current diabetes treatment:  Trulicity 4.5 mg once weekly, Basaglar 45 units twice a day, and Lyumjev insulin before each meal taking 8, 10, or 12 units based on small, medium, or large amounts of carbohydrates plus adding correction for glucose levels greater than 150 mg/dL starting with 2 units and increasing by 1 unit for every 25 mg/dL thereafter.  He states he is taking 25-35 units of Lyumjev with each meal     Blood glucose device:  Opti-Source CGM  Blood glucose monitoring frequency:  Continuous per CGM  Blood glucose range/average:  The 14-day sensor report shows an average glucose of 215 mg/dL, with 24% in target range ( mgdL), 53% in the high range (181-250 mg/dL), 23% in the very high range (>250 mg/dL), 0% in the low range (54-70 mg/dL) and 0% in the very low range (<54 mg/dL).   Glucose Source: Device Reviewed  Diet:  Limits high carb/sweet foods, Avoids sugary drinks  Activity/Exercise:   limited due to his back    Past Medical History:   Diagnosis Date     Anxiety     Arthritis     Asthma     - PT DENIED ASTHMA- NO INHALERS    Bradycardia     DEFBIRLLATOR/ PACEMAKER- MEDITRONIC- SEE'S DR. HAMEED DENIED CP/SOB    Chest pain     NO RECENT CP    CHF (congestive heart failure)     ASYMPTOMATIC    Condition not found     Hernia-Unspecified    Coronary artery disease     Deafness     Diabetes     BG RUNS AROUND 250-300 IN AM    HBP (high blood pressure)     Heart disease     High cholesterol     Kidney stones     Lung disease     Night sweats     Ringing in ear     Sleep apnea     Type 2 diabetes mellitus with autonomic neuropathy 07/16/2021     Past Surgical History:   Procedure Laterality Date    APPENDECTOMY      BACK SURGERY  2000,2010    lumbar with hardware    CARDIAC DEFIBRILLATOR PLACEMENT      MEDITRONIC    CHOLECYSTECTOMY      COLONOSCOPY N/A 06/22/2022    Procedure: COLONOSCOPY;  Surgeon: Nicholas Townsend MD;  Location: [facility] ENDOSCOPY;  Service: General;  Laterality: N/A;  DIVERTICULOSIS    CYSTOSCOPY W/ STONE MANIPULATION Right 1/5/2023    Procedure: CYSTOSCOPY KIDNEY STONE MANIPULATION/EXTRACTION;  Surgeon: Ann Mon MD;  Location: formerly [facility] MAIN OR;  Service: Urology;  Laterality: Right;    KIDNEY STONE SURGERY      NECK SURGERY  2005    removal of bone spurs    PACEMAKER IMPLANTATION      10 YEARS AGO PLACED- MEDITRONIC    URETEROSCOPY LASER LITHOTRIPSY WITH STENT INSERTION Left 1/5/2023    Procedure: LEFT URETEROSCOPY LASER LITHOTRIPSY WITH STENT INSERTION AND RETROGRADE; RIGHT URETEROSCOPY WITH STENT INSERTION AND RETROGRADE;  Surgeon: Ann Mon MD;  Location: formerly [facility] MAIN OR;  Service: Urology;  Laterality: Left;     Family History   Problem Relation Age of Onset    Other Mother 60        Renal Cancer    Prostate cancer Father     Colon cancer Father 70    Malig Hyperthermia Neg Hx      Social History     Socioeconomic History    Marital status:     Number of children: 2   Tobacco Use    Smoking status: Former     Current packs/day:  0.00     Average packs/day: 1.5 packs/day for 40.0 years (60.0 ttl pk-yrs)     Types: Cigarettes     Start date: 1965     Quit date:      Years since quittin.4     Passive exposure: Past    Smokeless tobacco: Never    Tobacco comments:     no second hand smoke exposure   Vaping Use    Vaping status: Never Used   Substance and Sexual Activity    Alcohol use: Never    Drug use: Never    Sexual activity: Defer     Allergies   Allergen Reactions    Codeine Nausea Only and Nausea And Vomiting       Current Outpatient Medications:     aspirin  MG tablet, Take 1 tablet by mouth Daily. PT STATED DR CHEN OFFICE INSTRUCTED TO STOP 5 DAYS PRIOR TO SURGERY, LAST DOSE PT STATED INSTRUCTED TO TAKE IS 22, Disp: , Rfl:     celecoxib (CeleBREX) 100 MG capsule, TAKE ONE CAPSULE BY MOUTH TWICE A DAY AS DIRECTED FOR 30 DAYS., Disp: 180 capsule, Rfl: 1    cholecalciferol (VITAMIN D3) 25 MCG (1000 UT) tablet, Take 2 tablets by mouth Daily., Disp: , Rfl:     cyclobenzaprine (FLEXERIL) 5 MG tablet, Take 1-2 tablets prn tid for muscle spasm, Disp: 90 tablet, Rfl: 1    escitalopram (LEXAPRO) 20 MG tablet, TAKE ONE TABLET BY MOUTH ONCE DAILY, Disp: 90 tablet, Rfl: 1    fenofibrate (TRICOR) 145 MG tablet, TAKE ONE TABLET BY MOUTH ONCE DAILY, Disp: 90 tablet, Rfl: 1    glucose blood test strip, Use as instructed, Disp: 100 each, Rfl: 2    glucose blood test strip, OneTouch Ultra Test strips, Disp: , Rfl:     ibuprofen (ADVIL,MOTRIN) 200 MG tablet, Take 1 tablet by mouth Every 6 (Six) Hours As Needed for Mild Pain., Disp: , Rfl:     Insulin Pen Needle (Pen Needles) 32G X 5 MM misc, Use 1 each 5 (Five) Times a Day., Disp: 450 each, Rfl: 1    Lyumjev KwikPen 200 UNIT/ML solution pen-injector, Inject 40 Units under the skin into the appropriate area as directed 3 (Three) Times a Day Before Meals., Disp: 72 mL, Rfl: 4    methylcellulose, Laxative, (CITRUCEL) 500 MG tablet tablet, , Disp: , Rfl:     metoprolol succinate XL  "(TOPROL-XL) 25 MG 24 hr tablet, Take 1 tablet by mouth Daily., Disp: , Rfl:     oxyCODONE-acetaminophen (PERCOCET)  MG per tablet, Take 1 tablet by mouth Every 4 (Four) Hours As Needed., Disp: , Rfl:     pregabalin (LYRICA) 300 MG capsule, Take 1 capsule by mouth Daily., Disp: , Rfl:     psyllium (Metamucil) 0.52 g capsule, , Disp: , Rfl:     rosuvastatin (CRESTOR) 10 MG tablet, TAKE 1 TABLET BY MOUTH DAILY., Disp: 90 tablet, Rfl: 1    tamsulosin (FLOMAX) 0.4 MG capsule 24 hr capsule, Take 2 capsules by mouth Daily., Disp: 180 capsule, Rfl: 4    vitamin B-12 (CYANOCOBALAMIN) 1000 MCG tablet, Take 1,200 mcg by mouth Daily., Disp: , Rfl:     vitamin E 100 UNIT capsule, Take 1 capsule by mouth Daily., Disp: , Rfl:     Zinc 50 MG capsule, Take  by mouth., Disp: , Rfl:     empagliflozin (Jardiance) 25 MG tablet tablet, Take 1 tablet by mouth Daily for 30 days., Disp: 28 tablet, Rfl: 0    Insulin Glargine (BASAGLAR KWIKPEN) 100 UNIT/ML injection pen, Inject 90 Units under the skin into the appropriate area as directed Daily for 180 days., Disp: 81 mL, Rfl: 1    Objective     Vitals:    06/13/24 1136   BP: 135/75   Pulse: 83   SpO2: 90%   Weight: 121 kg (266 lb)   Height: 188 cm (74\")   PainSc:   5     Body mass index is 34.15 kg/m².    Physical Exam  Constitutional:       Appearance: Normal appearance. He is obese.      Comments: Obesity (BMI 30 - 39.9) Pt Current BMI = 34.15    HENT:      Head: Normocephalic and atraumatic.      Right Ear: External ear normal.      Left Ear: External ear normal.      Nose: Nose normal.   Eyes:      Extraocular Movements: Extraocular movements intact.      Conjunctiva/sclera: Conjunctivae normal.   Pulmonary:      Effort: Pulmonary effort is normal.   Musculoskeletal:         General: Normal range of motion.      Cervical back: Normal range of motion.   Skin:     General: Skin is warm and dry.   Neurological:      General: No focal deficit present.      Mental Status: He is alert " and oriented to person, place, and time. Mental status is at baseline.   Psychiatric:         Mood and Affect: Mood normal.         Behavior: Behavior normal.         Thought Content: Thought content normal.         Judgment: Judgment normal.             Result Review :   The following data was reviewed by: ANGLE Mcdonnell on 06/13/2024:    Most Recent A1C          6/13/2024    11:45   HGBA1C Most Recent   Hemoglobin A1C 8.3        A1C Last 3 Results          12/5/2023    07:38 3/7/2024    11:39 6/13/2024    11:45   HGBA1C Last 3 Results   Hemoglobin A1C 7.5  8.6  8.3      A1c collected in the office today is 8.3%, indicating Uncontrolled Type II diabetes.  This result is down from the prior result of 8.3% collected on 3/7/24    Glucose   Date Value Ref Range Status   06/13/2024 245 (H) 70 - 99 mg/dL Final     Comment:     Serial Number: [id number]:  [id number]     Point of care glucose in the office today is  elevated at 245 mg/dL            Assessment: He has had a slight decrease in his A1c but remains well above target. The CGM shows elevated levels at all times.  He has some higher levels after He denies noncompliance with his medication or diet.      Diagnoses and all orders for this visit:    1. Uncontrolled type 2 diabetes mellitus with hyperglycemia (Primary)  -     POC Glycosylated Hemoglobin (Hb A1C)  -     empagliflozin (Jardiance) 25 MG tablet tablet; Take 1 tablet by mouth Daily for 30 days.  Dispense: 28 tablet; Refill: 0    2. Type 2 diabetes mellitus with diabetic neuropathy, with long-term current use of insulin    3. Type 2 diabetes mellitus with stage 2 chronic kidney disease, with long-term current use of insulin    4. Uses self-applied continuous glucose monitoring device    Other orders  -     POC Glucose        Plan: At this time the patient was advised to increase the Basaglar to 50 units twice a day.  We will also add Jardiance 25 mg once a day to his treatment plan.  We  will submit a request for the patient assistance program to see if he is eligible.    The patient will monitor his blood glucose levels using the CGM.  If he develops problematic hyperglycemia or hypoglycemia or adverse drug reactions, he will contact the office for further instructions.        Follow Up     Return in about 3 months (around 9/13/2024) for Medication Management, CGM Follow-up.    Patient was given instructions and counseling regarding his condition or for health maintenance advice. Please see specific information pulled into the AVS if appropriate.     Suzanna Agosto, ANGLE  06/13/2024      Dictated Utilizing Dragon Dictation.  Please note that portions of this note were completed with a voice recognition program.  Part of this note may be an electronic transcription/translation of spoken language to printed text using the Dragon Dictation System.

## 2024-06-14 ENCOUNTER — TELEPHONE (OUTPATIENT)
Dept: DIABETES SERVICES | Facility: CLINIC | Age: 69
End: 2024-06-14
Payer: MEDICARE

## 2024-06-14 NOTE — TELEPHONE ENCOUNTER
Chart notes faxed to the Pt's Insurance as this is what the insurance requested for PA with his Patel 3

## 2024-06-20 ENCOUNTER — TELEPHONE (OUTPATIENT)
Dept: DIABETES SERVICES | Facility: HOSPITAL | Age: 69
End: 2024-06-20

## 2024-06-20 NOTE — TELEPHONE ENCOUNTER
Caller: BEAU GIANA    Relationship:SELF    Callback number: [phone]   Is it ok to leave a message: [x] Yes [] No    Requested medication for samples: FREESTYLE CECILLE 3     How much medication does the patient currently have left: ONE LEFT, 6 DAYS LEFT    Who will be picking up the samples: PATIENT    Do you need information about patient financial assistance for this medication: [] Yes [x] No    Additional details provided: PATIENT IS SAYING [facility] WILL HOLD THIS SAMPLE FOR HIM TO  THIS WEEK.

## 2024-06-25 ENCOUNTER — TELEPHONE (OUTPATIENT)
Dept: DIABETES SERVICES | Facility: HOSPITAL | Age: 69
End: 2024-06-25
Payer: MEDICARE

## 2024-06-25 DIAGNOSIS — Z97.8 USES SELF-APPLIED CONTINUOUS GLUCOSE MONITORING DEVICE: ICD-10-CM

## 2024-06-25 DIAGNOSIS — E11.65 UNCONTROLLED TYPE 2 DIABETES MELLITUS WITH HYPERGLYCEMIA: Primary | ICD-10-CM

## 2024-06-25 DIAGNOSIS — Z79.4 ENCOUNTER FOR LONG-TERM (CURRENT) USE OF INSULIN: ICD-10-CM

## 2024-06-25 RX ORDER — BLOOD-GLUCOSE SENSOR
1 EACH MISCELLANEOUS
Qty: 6 EACH | Refills: 1 | Status: SHIPPED | OUTPATIENT
Start: 2024-06-25

## 2024-06-25 RX ORDER — KETOROLAC TROMETHAMINE 30 MG/ML
1 INJECTION, SOLUTION INTRAMUSCULAR; INTRAVENOUS ONCE
Qty: 1 EACH | Refills: 0 | Status: SHIPPED | OUTPATIENT
Start: 2024-06-25 | End: 2024-06-25

## 2024-06-25 RX ORDER — KETOROLAC TROMETHAMINE 30 MG/ML
INJECTION, SOLUTION INTRAMUSCULAR; INTRAVENOUS
COMMUNITY
End: 2024-06-25 | Stop reason: SDUPTHER

## 2024-06-25 RX ORDER — BLOOD-GLUCOSE SENSOR
EACH MISCELLANEOUS
COMMUNITY
End: 2024-06-25 | Stop reason: SDUPTHER

## 2024-06-25 NOTE — TELEPHONE ENCOUNTER
I have sent the refills for the Patel 3 sensors and the Patel 3 [place] to pt's local pharmacy. Per pt's insurance, Patel 3 sensors need to be sent to pharmacy that this falls under pharmacy benefits.

## 2024-06-25 NOTE — TELEPHONE ENCOUNTER
I spoke with [employer].   This was denied on 6/20/24. The rep reported this was told to the pt by them. It was denied B/C this is not able to be processed through DME benefits, but needs to go through the pt's pharmacy benefits. They advised this be sent to the pt's local pharmacy.

## 2024-06-26 ENCOUNTER — TELEPHONE (OUTPATIENT)
Dept: DIABETES SERVICES | Facility: HOSPITAL | Age: 69
End: 2024-06-26
Payer: MEDICARE

## 2024-06-26 NOTE — TELEPHONE ENCOUNTER
"Hub staff attempted to follow warm transfer process and was unsuccessful     Caller: Chaparro Weber \"Lane\"    Relationship to patient: Self    Best call back number: [phone]    Patient is needing: PT WAS CALLING BACK TO FOLLOW UP ON HIS CECILLE SENSORS. PLEASE CALL BACK AND UPDATE THE PT ON THE OUTCOME.         "

## 2024-06-26 NOTE — TELEPHONE ENCOUNTER
Called Allison SPEARS to check on appeal process for patient Patel 3 sensor and and reader device.   Spoke with Issac regarding this matter.  States that they have received all the the information needed and also chart notes needed 06/19/2024.  States that it is still under review.    Asked how long this could take and he states up to 30 days.

## 2024-07-02 ENCOUNTER — TELEPHONE (OUTPATIENT)
Dept: DIABETES SERVICES | Facility: HOSPITAL | Age: 69
End: 2024-07-02
Payer: MEDICARE

## 2024-07-02 NOTE — TELEPHONE ENCOUNTER
Called and let the pt know that I called [facility] pharmacy and his naman sensors went through his insurance  with no co-pay

## 2024-07-02 NOTE — TELEPHONE ENCOUNTER
"      Hub staff attempted to follow warm transfer process and was unsuccessful     Caller: Chaparro Weber \"Lane\"    Relationship to patient: Self    Best call back number: [phone]    Patient is needing: PT WANTED TO CALL AND ADVISE TAYLOR THAT HIS PHARMACY INSURANCE WILL COVER HIS MEDICATION FOR HIS CECILLE SENSORS WILL BE COVERED % AND WANTED TO THANK HER FOR ALL THAT SHE HAS DONE FOR HIM           "

## 2024-07-10 ENCOUNTER — TELEPHONE (OUTPATIENT)
Dept: DIABETES SERVICES | Facility: HOSPITAL | Age: 69
End: 2024-07-10
Payer: MEDICARE

## 2024-07-10 DIAGNOSIS — E11.65 UNCONTROLLED TYPE 2 DIABETES MELLITUS WITH HYPERGLYCEMIA: ICD-10-CM

## 2024-07-10 NOTE — PROGRESS NOTES
Chief Complaint: Benign Prostatic Hypertrophy and Nocturia    Subjective         History of Present Illness  Chaparro Weber is a 68 y.o. male presents to [facility] to be seen for follow-up.    Patient was previously seen by me with last visit on 1/3/2024 for BPH/kidney stones.  At that visit he was doing well on tamsulosin.  We also had plan to order his KUB to be done 6 months from now.  He is here for follow-up.    Patient reports he is doing well with tamsulosin. He is not voiding as frequently. He reports he is drinking a lot of water since starting Jardiance which seems helpful as well.     Previous 1/3/2024:  Patient was previously seen by me with last visit on 5/15/2023 for BPH/kidney stones.  He was doing markedly better with 2 tamsulosin daily at that visit and he was advised to follow-up with a KUB prior.  He is here for follow-up.     He had back surgery in June. He didn't not have any urinary symptoms post op.      He is doing well with his urinary symptoms.      He has stopped drinking tea and soda.      PSA  10/2023 <0.014     PROCEDURE:  XR ABDOMEN KUB     COMPARISON: [facility], CR, XR ABDOMEN KUB, 1/05/2023, 11:59.     INDICATIONS:  history of kidney stones. YEARLY FOLLOW UP. NO COMPLAINTS     FINDINGS:          There is an 8 mm stone projected over the lower pole of the left kidney.  No stones are identified   over the right kidney.  There are postoperative changes of lumbar fusion from L2 through L5.  The   patient has undergone prior cholecystectomy.     IMPRESSION:                 1. 8 mm calcified stone projected over the lower pole the left kidney.            Live Garcia MD         Electronically Signed and Approved By: Live Garcia MD on 1/02/2024 at 12:51                   Previous 5/15/2023:  Patient was previously seen by Dr. Mon on 1/11/2023 for follow-up of kidney stones.  He is to have a KUB next January for follow-up of kidney stones.   He was also seen and treated for BPH with nocturia.  His Flomax was increased to 2 capsules/day and he was advised to follow-up in 3 months to see if this has improved his symptoms. He reports his symptoms have improved dramatically since increasing Flomax. He reports he is having 0-1 episodes of nocturia.      PSA  7/29/2022 less than 0.014  7/19/2021 less than 0.014  9/18/2020 less than 0.01     Previous 1/11/2023:  The patient presents for a follow-up from bilateral ureteroscopy with laser on the right and stone extraction on the left. He had bilateral ureteral stents in place.     The patient reports he is doing well. He had kidney stones approximately 8 to 10 years ago. He drinks a lot of water. He states he plays pickleball. He reports his back has been bothering him. He had a little pain yesterday and this morning. He states he is feeling good. Both of his stents are out. He was working on his prostate and it was really enlarged. The medicine he was put on did not seem to work. He stopped taking it because of the stones. He has had a lot of prostate issues for a while.       Objective     Past Medical History:   Diagnosis Date    Anxiety     Arthritis     Asthma     - PT DENIED ASTHMA- NO INHALERS    Bradycardia     DEFBIRLLATOR/ PACEMAKER- MEDITRONIC- SEE'S DR. HAMEED DENIED CP/SOB    Chest pain     NO RECENT CP    CHF (congestive heart failure)     ASYMPTOMATIC    Condition not found     Hernia-Unspecified    Coronary artery disease     Deafness     Diabetes     BG RUNS AROUND 250-300 IN AM    HBP (high blood pressure)     Heart disease     High cholesterol     Kidney stones     Lung disease     Night sweats     Ringing in ear     Sleep apnea     Type 2 diabetes mellitus with autonomic neuropathy 07/16/2021       Past Surgical History:   Procedure Laterality Date    APPENDECTOMY      BACK SURGERY  2000,2010    lumbar with hardware    CARDIAC DEFIBRILLATOR PLACEMENT      MEDITRONIC    CHOLECYSTECTOMY       COLONOSCOPY N/A 06/22/2022    Procedure: COLONOSCOPY;  Surgeon: Nicholas Townsend MD;  Location: [facility] ENDOSCOPY;  Service: General;  Laterality: N/A;  DIVERTICULOSIS    CYSTOSCOPY W/ STONE MANIPULATION Right 1/5/2023    Procedure: CYSTOSCOPY KIDNEY STONE MANIPULATION/EXTRACTION;  Surgeon: Ann Mon MD;  Location: [facility] MAIN OR;  Service: Urology;  Laterality: Right;    KIDNEY STONE SURGERY      NECK SURGERY  2005    removal of bone spurs    PACEMAKER IMPLANTATION      10 YEARS AGO PLACED- MEDITRONIC    URETEROSCOPY LASER LITHOTRIPSY WITH STENT INSERTION Left 1/5/2023    Procedure: LEFT URETEROSCOPY LASER LITHOTRIPSY WITH STENT INSERTION AND RETROGRADE; RIGHT URETEROSCOPY WITH STENT INSERTION AND RETROGRADE;  Surgeon: nAn Mon MD;  Location: [facility] MAIN OR;  Service: Urology;  Laterality: Left;         Current Outpatient Medications:     aspirin  MG tablet, Take 1 tablet by mouth Daily. PT STATED DR MON OFFICE INSTRUCTED TO STOP 5 DAYS PRIOR TO SURGERY, LAST DOSE PT STATED INSTRUCTED TO TAKE IS 12-30-22, Disp: , Rfl:     celecoxib (CeleBREX) 100 MG capsule, TAKE ONE CAPSULE BY MOUTH TWICE A DAY AS DIRECTED FOR 30 DAYS., Disp: 180 capsule, Rfl: 1    cholecalciferol (VITAMIN D3) 25 MCG (1000 UT) tablet, Take 2 tablets by mouth Daily., Disp: , Rfl:     Continuous Glucose Sensor (FreeStyle Patel 3 Sensor) misc, Use 1 each Every 14 (Fourteen) Days., Disp: 6 each, Rfl: 1    cyclobenzaprine (FLEXERIL) 5 MG tablet, Take 1-2 tablets prn tid for muscle spasm, Disp: 90 tablet, Rfl: 1    empagliflozin (Jardiance) 25 MG tablet tablet, Take 1 tablet by mouth Daily for 180 days., Disp: 30 tablet, Rfl: 5    escitalopram (LEXAPRO) 20 MG tablet, TAKE ONE TABLET BY MOUTH ONCE DAILY, Disp: 90 tablet, Rfl: 1    fenofibrate (TRICOR) 145 MG tablet, TAKE ONE TABLET BY MOUTH ONCE DAILY, Disp: 90 tablet, Rfl: 1    glucose blood test strip, Use as instructed, Disp: 100 each, Rfl: 2    glucose blood test strip,  OneTouch Ultra Test strips, Disp: , Rfl:     ibuprofen (ADVIL,MOTRIN) 200 MG tablet, Take 1 tablet by mouth Every 6 (Six) Hours As Needed for Mild Pain., Disp: , Rfl:     Insulin Pen Needle (Pen Needles) 32G X 5 MM misc, Use 1 each 5 (Five) Times a Day., Disp: 450 each, Rfl: 1    Lyumjev KwikPen 200 UNIT/ML solution pen-injector, Inject 40 Units under the skin into the appropriate area as directed 3 (Three) Times a Day Before Meals., Disp: 72 mL, Rfl: 4    methylcellulose, Laxative, (CITRUCEL) 500 MG tablet tablet, , Disp: , Rfl:     metoprolol succinate XL (TOPROL-XL) 25 MG 24 hr tablet, Take 1 tablet by mouth Daily., Disp: , Rfl:     oxyCODONE-acetaminophen (PERCOCET)  MG per tablet, Take 1 tablet by mouth Every 4 (Four) Hours As Needed., Disp: , Rfl:     pregabalin (LYRICA) 300 MG capsule, Take 1 capsule by mouth Daily., Disp: , Rfl:     psyllium (Metamucil) 0.52 g capsule, , Disp: , Rfl:     rosuvastatin (CRESTOR) 10 MG tablet, TAKE 1 TABLET BY MOUTH DAILY., Disp: 90 tablet, Rfl: 1    tamsulosin (FLOMAX) 0.4 MG capsule 24 hr capsule, Take 2 capsules by mouth Daily., Disp: 180 capsule, Rfl: 4    vitamin B-12 (CYANOCOBALAMIN) 1000 MCG tablet, Take 1,200 mcg by mouth Daily., Disp: , Rfl:     vitamin E 100 UNIT capsule, Take 1 capsule by mouth Daily., Disp: , Rfl:     Zinc 50 MG capsule, Take  by mouth., Disp: , Rfl:     Insulin Glargine (BASAGLAR KWIKPEN) 100 UNIT/ML injection pen, Inject 90 Units under the skin into the appropriate area as directed Daily for 180 days., Disp: 81 mL, Rfl: 1    Allergies   Allergen Reactions    Codeine Nausea Only and Nausea And Vomiting        Family History   Problem Relation Age of Onset    Other Mother 60        Renal Cancer    Prostate cancer Father     Colon cancer Father 70    Malig Hyperthermia Neg Hx        Social History     Socioeconomic History    Marital status:     Number of children: 2   Tobacco Use    Smoking status: Former     Current packs/day: 0.00      "Average packs/day: 1.5 packs/day for 40.0 years (60.0 ttl pk-yrs)     Types: Cigarettes     Start date: 1965     Quit date:      Years since quittin.5     Passive exposure: Past    Smokeless tobacco: Never    Tobacco comments:     no second hand smoke exposure   Vaping Use    Vaping status: Never Used   Substance and Sexual Activity    Alcohol use: Never    Drug use: Never    Sexual activity: Defer       Vital Signs:   /75 (BP Location: Left arm, Patient Position: Sitting, Cuff Size: Large Adult)   Pulse 92   Ht 188 cm (74\")   Wt 121 kg (266 lb 12.1 oz)   BMI 34.25 kg/m²      Physical Exam  Vitals reviewed.   Constitutional:       Appearance: Normal appearance.   Neurological:      General: No focal deficit present.      Mental Status: He is alert and oriented to person, place, and time.   Psychiatric:         Mood and Affect: Mood normal.         Behavior: Behavior normal.          Result Review :   The following data was reviewed by: ANGLE Knox on 2024:  Results for orders placed or performed in visit on 24   Bladder Scan   Result Value Ref Range    Urine Volume 0       PSA          10/2/2023    08:09   PSA   PSA <0.014      Bladder Scan interpretation 2024    Estimation of residual urine via BVI 3000 [place] Bladder Scan  MA/nurse performing: Miriam BARBOZA MA  Residual Urine: 0 ml  Indication: Benign prostatic hyperplasia with nocturia    Kidney stone   Position: Supine  Examination: Incremental scanning of the suprapubic area using 2.0 MHz transducer using copious amounts of acoustic gel.   Findings: An anechoic area was demonstrated which represented the bladder, with measurement of residual urine as noted. I inspected this myself. In that the residual urine was stable or insignificant, refer to plan for treatment and plan necessary at this time.           Procedures        Assessment and Plan    Diagnoses and all orders for this visit:    1. Benign " prostatic hyperplasia with nocturia (Primary)  -     Bladder Scan    2. Kidney stone  -     XR Abdomen KUB; Future    Given that he is doing well with tamsulosin, he will continue with this medication.    I will plan to see him back in 6 months with KUB prior for his kidney stone.      Follow Up   No follow-ups on file.  Patient was given instructions and counseling regarding his condition or for health maintenance advice. Please see specific information pulled into the AVS if appropriate.         This document has been electronically signed by ANGLE Knox  July 12, 2024 08:28 EDT

## 2024-07-10 NOTE — TELEPHONE ENCOUNTER
Patient stopped by the clinic for samples of Jardiance, he wasn't sure why it wasn't in his pharmacy. I read off to him the prior visit's note where it was intended to be added to his patient assistance, and he requested an rx to be sent to [facility].

## 2024-07-11 ENCOUNTER — TELEPHONE (OUTPATIENT)
Dept: DIABETES SERVICES | Facility: HOSPITAL | Age: 69
End: 2024-07-11
Payer: MEDICARE

## 2024-07-11 NOTE — TELEPHONE ENCOUNTER
Caller: Chaparro Weber     Relationship:SELF    Callback number: [phone]     Requested medication for samples: JARDIANCE    Who will be picking up the samples: SELF    Do you need information about patient financial assistance for this medication: [x] Yes [] No    Additional details provided: PATIENT WANTS TO STOP BY TODAY AND GET JARDIANCE SAMPLES UNTIL THE JARDIANCE CAN BE ADDED TO HIS PATIENT ASSISTANCE PROGRAM.

## 2024-07-12 ENCOUNTER — OFFICE VISIT (OUTPATIENT)
Dept: UROLOGY | Facility: CLINIC | Age: 69
End: 2024-07-12
Payer: MEDICARE

## 2024-07-12 VITALS
HEIGHT: 74 IN | BODY MASS INDEX: 34.23 KG/M2 | HEART RATE: 92 BPM | DIASTOLIC BLOOD PRESSURE: 75 MMHG | SYSTOLIC BLOOD PRESSURE: 132 MMHG | WEIGHT: 266.76 LBS

## 2024-07-12 DIAGNOSIS — N40.1 BENIGN PROSTATIC HYPERPLASIA WITH NOCTURIA: Primary | ICD-10-CM

## 2024-07-12 DIAGNOSIS — R35.1 BENIGN PROSTATIC HYPERPLASIA WITH NOCTURIA: Primary | ICD-10-CM

## 2024-07-12 DIAGNOSIS — N20.0 KIDNEY STONE: ICD-10-CM

## 2024-07-12 LAB — URINE VOLUME: 0

## 2024-07-19 NOTE — TELEPHONE ENCOUNTER
Will check on coverage and if is was added to PT assistance next week due to computers being down and unable to see

## 2024-07-29 RX ORDER — ROSUVASTATIN CALCIUM 10 MG/1
10 TABLET, COATED ORAL DAILY
Qty: 90 TABLET | Refills: 1 | Status: SHIPPED | OUTPATIENT
Start: 2024-07-29

## 2024-08-08 ENCOUNTER — TELEPHONE (OUTPATIENT)
Dept: DIABETES SERVICES | Facility: HOSPITAL | Age: 69
End: 2024-08-08

## 2024-08-08 NOTE — TELEPHONE ENCOUNTER
Caller: Chaparro Weber      Relationship:SELF     Callback number: [phone] - VM OKAY     Requested medication for samples: JARDIANCE     Who will be picking up the samples: SELF     Do you need information about patient financial assistance for this medication: [x] Yes [] No     Additional details provided: PATIENT WANTS TO STOP BY MONDAY AND GET JARDIANCE SAMPLES AGAIN -  THE PATIENT ASSISTANCE PROGRAM STILL HASN'T TAKEN EFFECT, AND HE NEEDS TO KNOW WHAT TO DO TO GET THAT STARTED. PLEASE CALL PATIENT BACK AND ADVISE.  THIS IS THE THIRD ROUND OF HAVING TO GET SAMPLES.

## 2024-08-09 ENCOUNTER — TELEPHONE (OUTPATIENT)
Dept: DIABETES SERVICES | Facility: CLINIC | Age: 69
End: 2024-08-09
Payer: MEDICARE

## 2024-08-09 NOTE — TELEPHONE ENCOUNTER
I have spoken to pt regarding this. Pt assiatnce paperwork if filled out from provider stand point. He will need to finish his part on Monday when he comes in for samples. Left VM with this info, ok per pt to do so

## 2024-08-09 NOTE — TELEPHONE ENCOUNTER
Called pt to inform patient assistance packet has been filled out by provider. He will need to come into office on Monday to fill out his part.     Per Suzanna mendoza for pt to  Jardiance samples on Monday.

## 2024-09-03 RX ORDER — FENOFIBRATE 145 MG/1
TABLET, COATED ORAL
Qty: 90 TABLET | Refills: 1 | Status: SHIPPED | OUTPATIENT
Start: 2024-09-03

## 2024-09-18 RX ORDER — CELECOXIB 100 MG/1
CAPSULE ORAL
Qty: 180 CAPSULE | Refills: 1 | Status: SHIPPED | OUTPATIENT
Start: 2024-09-18

## 2024-09-24 ENCOUNTER — OFFICE VISIT (OUTPATIENT)
Dept: DIABETES SERVICES | Facility: HOSPITAL | Age: 69
End: 2024-09-24
Payer: MEDICARE

## 2024-09-24 VITALS
SYSTOLIC BLOOD PRESSURE: 116 MMHG | HEIGHT: 74 IN | DIASTOLIC BLOOD PRESSURE: 76 MMHG | BODY MASS INDEX: 33.73 KG/M2 | OXYGEN SATURATION: 99 % | TEMPERATURE: 98.3 F | HEART RATE: 88 BPM | WEIGHT: 262.8 LBS

## 2024-09-24 DIAGNOSIS — E11.40 TYPE 2 DIABETES MELLITUS WITH DIABETIC NEUROPATHY, WITH LONG-TERM CURRENT USE OF INSULIN: ICD-10-CM

## 2024-09-24 DIAGNOSIS — Z97.8 USES SELF-APPLIED CONTINUOUS GLUCOSE MONITORING DEVICE: ICD-10-CM

## 2024-09-24 DIAGNOSIS — Z79.4 TYPE 2 DIABETES MELLITUS WITH DIABETIC NEUROPATHY, WITH LONG-TERM CURRENT USE OF INSULIN: ICD-10-CM

## 2024-09-24 DIAGNOSIS — E11.65 UNCONTROLLED TYPE 2 DIABETES MELLITUS WITH HYPERGLYCEMIA: Primary | ICD-10-CM

## 2024-09-24 LAB
EXPIRATION DATE: ABNORMAL
GLUCOSE BLDC GLUCOMTR-MCNC: 187 MG/DL (ref 70–99)
HBA1C MFR BLD: 7.1 % (ref 4.5–5.7)
Lab: ABNORMAL

## 2024-09-24 PROCEDURE — 3078F DIAST BP <80 MM HG: CPT | Performed by: NURSE PRACTITIONER

## 2024-09-24 PROCEDURE — 99213 OFFICE O/P EST LOW 20 MIN: CPT | Performed by: NURSE PRACTITIONER

## 2024-09-24 PROCEDURE — 95251 CONT GLUC MNTR ANALYSIS I&R: CPT | Performed by: NURSE PRACTITIONER

## 2024-09-24 PROCEDURE — G0463 HOSPITAL OUTPT CLINIC VISIT: HCPCS | Performed by: NURSE PRACTITIONER

## 2024-09-24 PROCEDURE — 3051F HG A1C>EQUAL 7.0%<8.0%: CPT | Performed by: NURSE PRACTITIONER

## 2024-09-24 PROCEDURE — 83036 HEMOGLOBIN GLYCOSYLATED A1C: CPT | Performed by: NURSE PRACTITIONER

## 2024-09-24 PROCEDURE — 3074F SYST BP LT 130 MM HG: CPT | Performed by: NURSE PRACTITIONER

## 2024-09-24 PROCEDURE — 1159F MED LIST DOCD IN RCRD: CPT | Performed by: NURSE PRACTITIONER

## 2024-09-24 PROCEDURE — 82948 REAGENT STRIP/BLOOD GLUCOSE: CPT | Performed by: NURSE PRACTITIONER

## 2024-09-24 PROCEDURE — 1160F RVW MEDS BY RX/DR IN RCRD: CPT | Performed by: NURSE PRACTITIONER

## 2024-09-24 RX ORDER — BLOOD-GLUCOSE SENSOR
1 EACH MISCELLANEOUS
Qty: 6 EACH | Refills: 1 | Status: SHIPPED | OUTPATIENT
Start: 2024-09-24

## 2024-10-07 ENCOUNTER — OFFICE VISIT (OUTPATIENT)
Dept: FAMILY MEDICINE CLINIC | Facility: CLINIC | Age: 69
End: 2024-10-07
Payer: MEDICARE

## 2024-10-07 VITALS
TEMPERATURE: 97.1 F | RESPIRATION RATE: 18 BRPM | OXYGEN SATURATION: 90 % | HEART RATE: 67 BPM | SYSTOLIC BLOOD PRESSURE: 109 MMHG | HEIGHT: 74 IN | BODY MASS INDEX: 34.34 KG/M2 | DIASTOLIC BLOOD PRESSURE: 68 MMHG | WEIGHT: 267.6 LBS

## 2024-10-07 DIAGNOSIS — Z12.5 SCREENING FOR PROSTATE CANCER: ICD-10-CM

## 2024-10-07 DIAGNOSIS — R06.02 SOB (SHORTNESS OF BREATH): Primary | ICD-10-CM

## 2024-10-07 DIAGNOSIS — Z00.00 ANNUAL PHYSICAL EXAM: ICD-10-CM

## 2024-10-07 DIAGNOSIS — E78.2 MIXED HYPERLIPIDEMIA: Chronic | ICD-10-CM

## 2024-10-07 DIAGNOSIS — E66.09 CLASS 1 OBESITY DUE TO EXCESS CALORIES WITH SERIOUS COMORBIDITY AND BODY MASS INDEX (BMI) OF 34.0 TO 34.9 IN ADULT: Chronic | ICD-10-CM

## 2024-10-07 DIAGNOSIS — Z23 NEED FOR PNEUMOCOCCAL 20-VALENT CONJUGATE VACCINATION: ICD-10-CM

## 2024-10-07 DIAGNOSIS — R53.83 OTHER FATIGUE: ICD-10-CM

## 2024-10-07 DIAGNOSIS — Z11.59 NEED FOR HEPATITIS C SCREENING TEST: ICD-10-CM

## 2024-10-07 DIAGNOSIS — I10 ESSENTIAL HYPERTENSION: Chronic | ICD-10-CM

## 2024-10-07 DIAGNOSIS — E66.811 CLASS 1 OBESITY DUE TO EXCESS CALORIES WITH SERIOUS COMORBIDITY AND BODY MASS INDEX (BMI) OF 34.0 TO 34.9 IN ADULT: Chronic | ICD-10-CM

## 2024-10-07 PROCEDURE — 1159F MED LIST DOCD IN RCRD: CPT | Performed by: FAMILY MEDICINE

## 2024-10-07 PROCEDURE — 1160F RVW MEDS BY RX/DR IN RCRD: CPT | Performed by: FAMILY MEDICINE

## 2024-10-07 PROCEDURE — 3074F SYST BP LT 130 MM HG: CPT | Performed by: FAMILY MEDICINE

## 2024-10-07 PROCEDURE — 3051F HG A1C>EQUAL 7.0%<8.0%: CPT | Performed by: FAMILY MEDICINE

## 2024-10-07 PROCEDURE — 90677 PCV20 VACCINE IM: CPT | Performed by: FAMILY MEDICINE

## 2024-10-07 PROCEDURE — G0009 ADMIN PNEUMOCOCCAL VACCINE: HCPCS | Performed by: FAMILY MEDICINE

## 2024-10-07 PROCEDURE — 1126F AMNT PAIN NOTED NONE PRSNT: CPT | Performed by: FAMILY MEDICINE

## 2024-10-07 PROCEDURE — 3078F DIAST BP <80 MM HG: CPT | Performed by: FAMILY MEDICINE

## 2024-10-07 PROCEDURE — 99214 OFFICE O/P EST MOD 30 MIN: CPT | Performed by: FAMILY MEDICINE

## 2024-10-07 NOTE — ASSESSMENT & PLAN NOTE
Patient's (Body mass index is 34.34 kg/m².) indicates that they are obese (BMI >30) with health conditions that include hypertension, diabetes mellitus, and dyslipidemias . Weight is worsening. BMI  is above average; BMI management plan is completed. We discussed low calorie, low carb based diet program, portion control, and increasing exercise.

## 2024-10-07 NOTE — ASSESSMENT & PLAN NOTE
Lipid abnormalities are improving with treatment.  Crestor was added today to help lower his cholesterol.   Lipids will be reassessed in 6 months.    The ASCVD Risk score (Ronak DELUNA, et al., 2019) failed to calculate for the following reasons:    The valid total cholesterol range is 130 to 320 mg/dL

## 2024-10-07 NOTE — PROGRESS NOTES
"Chief Complaint  Numbness (L side upper extremity X4-5 months), Fatigue (X4-5 months), and Shortness of Breath (With exertion X4-5 months)    Subjective        Chaparro Weber presents to [facility]  History of Present Illness  He is here today for the management of his chronic medical conditions. He has asthma, COPD, congestive heart failure, chronic back pain managed by pain management, CAD with pace maker, diabetes, hypertension, very low testosterone levels, hyperlipidemia, kidney stones.  He has a family history of coronary disease and colon cancer and his dad  at 74.  He is a previous smoker and smoked for approximately 12 years.  He denies alcohol use.     He is feeling more fatigued today. He says that he has a lack of energy.      The patient has no complaints today and denies chest pain, shortness of breath, weakness, numbness, nausea, vomiting, diarrhea, dizziness or syncopal event.        Objective   Vital Signs:  /68 (BP Location: Left arm, Patient Position: Sitting, Cuff Size: Adult)   Pulse 67   Temp 97.1 °F (36.2 °C)   Resp 18   Ht 188 cm (74.02\")   Wt 121 kg (267 lb 9.6 oz)   SpO2 90%   BMI 34.34 kg/m²   Estimated body mass index is 34.34 kg/m² as calculated from the following:    Height as of this encounter: 188 cm (74.02\").    Weight as of this encounter: 121 kg (267 lb 9.6 oz).            Physical Exam  Vitals reviewed.   Constitutional:       Appearance: He is well-developed. He is obese.   HENT:      Head: Normocephalic and atraumatic.      Right Ear: External ear normal.      Left Ear: External ear normal.      Mouth/Throat:      Pharynx: No oropharyngeal exudate.   Eyes:      Conjunctiva/sclera: Conjunctivae normal.      Pupils: Pupils are equal, round, and reactive to light.   Neck:      Vascular: No carotid bruit.   Cardiovascular:      Rate and Rhythm: Normal rate and regular rhythm.      Heart sounds: No murmur heard.     No friction " rub. No gallop.   Pulmonary:      Effort: Pulmonary effort is normal.      Breath sounds: Normal breath sounds. No wheezing or rhonchi.   Abdominal:      General: There is no distension.   Skin:     General: Skin is warm and dry.   Neurological:      Mental Status: He is alert and oriented to person, place, and time.      Cranial Nerves: No cranial nerve deficit.      Motor: No weakness.   Psychiatric:         Mood and Affect: Mood and affect normal.         Behavior: Behavior normal.         Thought Content: Thought content normal.         Judgment: Judgment normal.        Result Review :                      Assessment and Plan   Diagnoses and all orders for this visit:    1. SOB (shortness of breath) (Primary)  Comments:  HIs 02 was 90 today. His recent imaging revealed basilar atelectsis. A repeat Xray was ordered today to be managed according to findings.  Orders:  -     XR Chest 2 View; Future    2. Class 1 obesity due to excess calories with serious comorbidity and body mass index (BMI) of 34.0 to 34.9 in adult  Assessment & Plan:  Patient's (Body mass index is 34.34 kg/m².) indicates that they are obese (BMI >30) with health conditions that include hypertension, diabetes mellitus, and dyslipidemias . Weight is worsening. BMI  is above average; BMI management plan is completed. We discussed low calorie, low carb based diet program, portion control, and increasing exercise.       3. Essential hypertension  Assessment & Plan:  Hypertension is stable and controlled  Continue current treatment regimen.  Dietary sodium restriction.  Weight loss.  Blood pressure will be reassessed in 6 months.      4. Annual physical exam  -     CBC w AUTO Differential; Future  -     Comprehensive metabolic panel; Future  -     TSH+Free T4; Future  -     Urinalysis With Microscopic - Urine, Clean Catch; Future    5. Screening for prostate cancer  -     PSA SCREENING; Future    6. Need for hepatitis C screening test  -     Hepatitis  C antibody; Future    7. Other fatigue  -     Testosterone (Free & Total), LC / MS; Future    8. Mixed hyperlipidemia  Assessment & Plan:  Lipid abnormalities are improving with treatment.  Crestor was added today to help lower his cholesterol.   Lipids will be reassessed in 6 months.    The ASCVD Risk score (Ronak DELUNA, et al., 2019) failed to calculate for the following reasons:    The valid total cholesterol range is 130 to 320 mg/dL      Orders:  -     Lipid panel; Future    9. Need for pneumococcal 20-valent conjugate vaccination  -     Pneumococcal Conjugate Vaccine 20-Valent (PCV20)             Follow Up   Return in about 4 months (around 2/7/2025).  Patient was given instructions and counseling regarding his condition or for health maintenance advice. Please see specific information pulled into the AVS if appropriate.

## 2024-10-11 ENCOUNTER — HOSPITAL ENCOUNTER (OUTPATIENT)
Dept: GENERAL RADIOLOGY | Facility: HOSPITAL | Age: 69
Discharge: HOME | End: 2024-10-11
Payer: MEDICARE

## 2024-10-11 ENCOUNTER — LAB (OUTPATIENT)
Dept: LAB | Facility: HOSPITAL | Age: 69
End: 2024-10-11
Payer: MEDICARE

## 2024-10-11 DIAGNOSIS — Z00.00 ANNUAL PHYSICAL EXAM: ICD-10-CM

## 2024-10-11 DIAGNOSIS — Z12.5 SCREENING FOR PROSTATE CANCER: ICD-10-CM

## 2024-10-11 DIAGNOSIS — Z11.59 NEED FOR HEPATITIS C SCREENING TEST: ICD-10-CM

## 2024-10-11 DIAGNOSIS — R06.02 SOB (SHORTNESS OF BREATH): ICD-10-CM

## 2024-10-11 DIAGNOSIS — E78.2 MIXED HYPERLIPIDEMIA: Chronic | ICD-10-CM

## 2024-10-11 DIAGNOSIS — R53.83 OTHER FATIGUE: ICD-10-CM

## 2024-10-11 DIAGNOSIS — E11.65 UNCONTROLLED TYPE 2 DIABETES MELLITUS WITH HYPERGLYCEMIA: ICD-10-CM

## 2024-10-11 LAB
ALBUMIN SERPL-MCNC: 4.4 G/DL (ref 3.5–5.2)
ALBUMIN UR-MCNC: <1.2 MG/DL
ALBUMIN/GLOB SERPL: 1.6 G/DL
ALP SERPL-CCNC: 46 U/L (ref 39–117)
ALT SERPL W P-5'-P-CCNC: 28 U/L (ref 1–41)
ANION GAP SERPL CALCULATED.3IONS-SCNC: 7.2 MMOL/L (ref 5–15)
AST SERPL-CCNC: 39 U/L (ref 1–40)
BACTERIA UR QL AUTO: NORMAL /HPF
BASOPHILS # BLD AUTO: 0.03 10*3/MM3 (ref 0–0.2)
BASOPHILS NFR BLD AUTO: 0.9 % (ref 0–1.5)
BILIRUB SERPL-MCNC: 0.4 MG/DL (ref 0–1.2)
BILIRUB UR QL STRIP: NEGATIVE
BUN SERPL-MCNC: 20 MG/DL (ref 8–23)
BUN/CREAT SERPL: 16.9 (ref 7–25)
CALCIUM SPEC-SCNC: 9.5 MG/DL (ref 8.6–10.5)
CHLORIDE SERPL-SCNC: 104 MMOL/L (ref 98–107)
CHOLEST SERPL-MCNC: 142 MG/DL (ref 0–200)
CLARITY UR: CLEAR
CO2 SERPL-SCNC: 30.8 MMOL/L (ref 22–29)
COLOR UR: YELLOW
CREAT SERPL-MCNC: 1.18 MG/DL (ref 0.76–1.27)
CREAT UR-MCNC: 64.5 MG/DL
DEPRECATED RDW RBC AUTO: 45.3 FL (ref 37–54)
EGFRCR SERPLBLD CKD-EPI 2021: 67.6 ML/MIN/1.73
EOSINOPHIL # BLD AUTO: 0.05 10*3/MM3 (ref 0–0.4)
EOSINOPHIL NFR BLD AUTO: 1.5 % (ref 0.3–6.2)
ERYTHROCYTE [DISTWIDTH] IN BLOOD BY AUTOMATED COUNT: 13.6 % (ref 12.3–15.4)
GLOBULIN UR ELPH-MCNC: 2.7 GM/DL
GLUCOSE SERPL-MCNC: 123 MG/DL (ref 65–99)
GLUCOSE UR STRIP-MCNC: ABNORMAL MG/DL
HCT VFR BLD AUTO: 46.9 % (ref 37.5–51)
HCV AB SER QL: NORMAL
HDLC SERPL-MCNC: 36 MG/DL (ref 40–60)
HGB BLD-MCNC: 14.7 G/DL (ref 13–17.7)
HGB UR QL STRIP.AUTO: NEGATIVE
HYALINE CASTS UR QL AUTO: NORMAL /LPF
IMM GRANULOCYTES # BLD AUTO: 0.01 10*3/MM3 (ref 0–0.05)
IMM GRANULOCYTES NFR BLD AUTO: 0.3 % (ref 0–0.5)
KETONES UR QL STRIP: NEGATIVE
LDLC SERPL CALC-MCNC: 73 MG/DL (ref 0–100)
LDLC/HDLC SERPL: 1.86 {RATIO}
LEUKOCYTE ESTERASE UR QL STRIP.AUTO: NEGATIVE
LYMPHOCYTES # BLD AUTO: 1.29 10*3/MM3 (ref 0.7–3.1)
LYMPHOCYTES NFR BLD AUTO: 39 % (ref 19.6–45.3)
MCH RBC QN AUTO: 28.2 PG (ref 26.6–33)
MCHC RBC AUTO-ENTMCNC: 31.3 G/DL (ref 31.5–35.7)
MCV RBC AUTO: 90 FL (ref 79–97)
MICROALBUMIN/CREAT UR: NORMAL MG/G{CREAT}
MONOCYTES # BLD AUTO: 0.39 10*3/MM3 (ref 0.1–0.9)
MONOCYTES NFR BLD AUTO: 11.8 % (ref 5–12)
NEUTROPHILS NFR BLD AUTO: 1.54 10*3/MM3 (ref 1.7–7)
NEUTROPHILS NFR BLD AUTO: 46.5 % (ref 42.7–76)
NITRITE UR QL STRIP: NEGATIVE
NRBC BLD AUTO-RTO: 0 /100 WBC (ref 0–0.2)
PH UR STRIP.AUTO: 5.5 [PH] (ref 5–8)
PLATELET # BLD AUTO: 88 10*3/MM3 (ref 140–450)
PMV BLD AUTO: 11.2 FL (ref 6–12)
POTASSIUM SERPL-SCNC: 4.7 MMOL/L (ref 3.5–5.2)
PROT SERPL-MCNC: 7.1 G/DL (ref 6–8.5)
PROT UR QL STRIP: NEGATIVE
PSA SERPL-MCNC: <0.014 NG/ML (ref 0–4)
RBC # BLD AUTO: 5.21 10*6/MM3 (ref 4.14–5.8)
RBC # UR STRIP: NORMAL /HPF
REF LAB TEST METHOD: NORMAL
SODIUM SERPL-SCNC: 142 MMOL/L (ref 136–145)
SP GR UR STRIP: 1.03 (ref 1–1.03)
SQUAMOUS #/AREA URNS HPF: NORMAL /HPF
T4 FREE SERPL-MCNC: 0.96 NG/DL (ref 0.92–1.68)
TRIGL SERPL-MCNC: 195 MG/DL (ref 0–150)
TSH SERPL DL<=0.05 MIU/L-ACNC: 3.32 UIU/ML (ref 0.27–4.2)
UROBILINOGEN UR QL STRIP: ABNORMAL
VLDLC SERPL-MCNC: 33 MG/DL (ref 5–40)
WBC # UR STRIP: NORMAL /HPF
WBC NRBC COR # BLD AUTO: 3.31 10*3/MM3 (ref 3.4–10.8)

## 2024-10-11 PROCEDURE — 81001 URINALYSIS AUTO W/SCOPE: CPT

## 2024-10-11 PROCEDURE — 82043 UR ALBUMIN QUANTITATIVE: CPT

## 2024-10-11 PROCEDURE — 84443 ASSAY THYROID STIM HORMONE: CPT

## 2024-10-11 PROCEDURE — 84403 ASSAY OF TOTAL TESTOSTERONE: CPT

## 2024-10-11 PROCEDURE — 80061 LIPID PANEL: CPT

## 2024-10-11 PROCEDURE — 84402 ASSAY OF FREE TESTOSTERONE: CPT

## 2024-10-11 PROCEDURE — 80053 COMPREHEN METABOLIC PANEL: CPT

## 2024-10-11 PROCEDURE — G0103 PSA SCREENING: HCPCS

## 2024-10-11 PROCEDURE — 84439 ASSAY OF FREE THYROXINE: CPT

## 2024-10-11 PROCEDURE — 85025 COMPLETE CBC W/AUTO DIFF WBC: CPT

## 2024-10-11 PROCEDURE — 86803 HEPATITIS C AB TEST: CPT

## 2024-10-11 PROCEDURE — 82570 ASSAY OF URINE CREATININE: CPT

## 2024-10-11 PROCEDURE — 36415 COLL VENOUS BLD VENIPUNCTURE: CPT

## 2024-10-11 PROCEDURE — 71046 X-RAY EXAM CHEST 2 VIEWS: CPT

## 2024-10-17 DIAGNOSIS — D70.9 NEUTROPENIA, UNSPECIFIED TYPE: Primary | ICD-10-CM

## 2024-10-17 DIAGNOSIS — D69.6 THROMBOCYTOPENIA: ICD-10-CM

## 2024-10-20 DIAGNOSIS — D69.6 THROMBOCYTOPENIA: Primary | ICD-10-CM

## 2024-10-20 LAB
TESTOST FREE SERPL-MCNC: 0.3 PG/ML (ref 6.6–18.1)
TESTOST SERPL-MCNC: 34.6 NG/DL (ref 264–916)

## 2024-10-28 ENCOUNTER — TELEPHONE (OUTPATIENT)
Dept: FAMILY MEDICINE CLINIC | Facility: CLINIC | Age: 69
End: 2024-10-28
Payer: MEDICARE

## 2024-10-28 NOTE — TELEPHONE ENCOUNTER
Caller: GIANANELLI    Relationship: Emergency Contact    Best call back number: [phone]      What test was performed: BLOOD WORK     When was the test performed: 10/11/2024    Where was the test performed: IN OFFICE     Additional notes: BLOOD TEST RESULTS FROM 10/11/2024  NEED TO BE SENT TO THE HEMATOLOGIST PATIENT HAS BEEN REFERRED TO BEFORE THEY WILL SCHEDULE AN APPOINTMENT FOR HIM   PLEASE SEND AS SOON AS POSSIBLE     JIN SANCHEZ [place] [id number]

## 2024-11-11 RX ORDER — ESCITALOPRAM OXALATE 20 MG/1
TABLET ORAL
Qty: 30 TABLET | Refills: 1 | Status: SHIPPED | OUTPATIENT
Start: 2024-11-11

## 2024-11-13 ENCOUNTER — LAB (OUTPATIENT)
Dept: LAB | Facility: HOSPITAL | Age: 69
End: 2024-11-13
Payer: MEDICARE

## 2024-11-13 DIAGNOSIS — D69.6 THROMBOCYTOPENIA: ICD-10-CM

## 2024-11-13 LAB
BASOPHILS # BLD AUTO: 0.01 10*3/MM3 (ref 0–0.2)
BASOPHILS NFR BLD AUTO: 0.4 % (ref 0–1.5)
DEPRECATED RDW RBC AUTO: 43.5 FL (ref 37–54)
EOSINOPHIL # BLD AUTO: 0.03 10*3/MM3 (ref 0–0.4)
EOSINOPHIL NFR BLD AUTO: 1.2 % (ref 0.3–6.2)
ERYTHROCYTE [DISTWIDTH] IN BLOOD BY AUTOMATED COUNT: 13.6 % (ref 12.3–15.4)
HCT VFR BLD AUTO: 43.9 % (ref 37.5–51)
HGB BLD-MCNC: 14.4 G/DL (ref 13–17.7)
IMM GRANULOCYTES # BLD AUTO: 0.01 10*3/MM3 (ref 0–0.05)
IMM GRANULOCYTES NFR BLD AUTO: 0.4 % (ref 0–0.5)
LYMPHOCYTES # BLD AUTO: 1.08 10*3/MM3 (ref 0.7–3.1)
LYMPHOCYTES NFR BLD AUTO: 41.7 % (ref 19.6–45.3)
MCH RBC QN AUTO: 29.1 PG (ref 26.6–33)
MCHC RBC AUTO-ENTMCNC: 32.8 G/DL (ref 31.5–35.7)
MCV RBC AUTO: 88.9 FL (ref 79–97)
MONOCYTES # BLD AUTO: 0.31 10*3/MM3 (ref 0.1–0.9)
MONOCYTES NFR BLD AUTO: 12 % (ref 5–12)
NEUTROPHILS NFR BLD AUTO: 1.15 10*3/MM3 (ref 1.7–7)
NEUTROPHILS NFR BLD AUTO: 44.3 % (ref 42.7–76)
NRBC BLD AUTO-RTO: 0 /100 WBC (ref 0–0.2)
PLATELET # BLD AUTO: 66 10*3/MM3 (ref 140–450)
PMV BLD AUTO: 10.7 FL (ref 6–12)
RBC # BLD AUTO: 4.94 10*6/MM3 (ref 4.14–5.8)
WBC NRBC COR # BLD AUTO: 2.59 10*3/MM3 (ref 3.4–10.8)

## 2024-11-13 PROCEDURE — 36415 COLL VENOUS BLD VENIPUNCTURE: CPT

## 2024-11-13 PROCEDURE — 85025 COMPLETE CBC W/AUTO DIFF WBC: CPT

## 2024-11-21 ENCOUNTER — CONSULT (OUTPATIENT)
Dept: ONCOLOGY | Facility: HOSPITAL | Age: 69
End: 2024-11-21
Payer: MEDICARE

## 2024-11-21 ENCOUNTER — LAB (OUTPATIENT)
Dept: ONCOLOGY | Facility: HOSPITAL | Age: 69
End: 2024-11-21
Payer: MEDICARE

## 2024-11-21 VITALS
BODY MASS INDEX: 34.5 KG/M2 | OXYGEN SATURATION: 92 % | TEMPERATURE: 97.8 F | RESPIRATION RATE: 20 BRPM | HEIGHT: 74 IN | WEIGHT: 268.8 LBS | HEART RATE: 94 BPM | DIASTOLIC BLOOD PRESSURE: 66 MMHG | SYSTOLIC BLOOD PRESSURE: 93 MMHG

## 2024-11-21 DIAGNOSIS — K76.0 FATTY LIVER: ICD-10-CM

## 2024-11-21 DIAGNOSIS — D72.819 LEUKOPENIA, UNSPECIFIED TYPE: ICD-10-CM

## 2024-11-21 DIAGNOSIS — D69.6 THROMBOCYTOPENIA: ICD-10-CM

## 2024-11-21 DIAGNOSIS — D64.9 ANEMIA, UNSPECIFIED TYPE: Primary | ICD-10-CM

## 2024-11-21 DIAGNOSIS — R16.1 SPLENOMEGALY, NOT ELSEWHERE CLASSIFIED: Primary | ICD-10-CM

## 2024-11-21 LAB
DEPRECATED RDW RBC AUTO: 46.6 FL (ref 37–54)
EOSINOPHIL # BLD MANUAL: 0.11 10*3/MM3 (ref 0–0.4)
EOSINOPHIL NFR BLD MANUAL: 4 % (ref 0.3–6.2)
ERYTHROCYTE [DISTWIDTH] IN BLOOD BY AUTOMATED COUNT: 13.9 % (ref 12.3–15.4)
FERRITIN SERPL-MCNC: 101.8 NG/ML (ref 30–400)
FOLATE SERPL-MCNC: >20 NG/ML (ref 4.78–24.2)
HCT VFR BLD AUTO: 45.8 % (ref 37.5–51)
HGB BLD-MCNC: 14.4 G/DL (ref 13–17.7)
IRON 24H UR-MRATE: 85 MCG/DL (ref 59–158)
IRON SATN MFR SERPL: 17 % (ref 20–50)
LDH SERPL-CCNC: NORMAL U/L
LYMPHOCYTES # BLD MANUAL: 1 10*3/MM3 (ref 0.7–3.1)
LYMPHOCYTES NFR BLD MANUAL: 10 % (ref 5–12)
MCH RBC QN AUTO: 28.7 PG (ref 26.6–33)
MCHC RBC AUTO-ENTMCNC: 31.4 G/DL (ref 31.5–35.7)
MCV RBC AUTO: 91.4 FL (ref 79–97)
MONOCYTES # BLD: 0.28 10*3/MM3 (ref 0.1–0.9)
NEUTROPHILS # BLD AUTO: 1.4 10*3/MM3 (ref 1.7–7)
NEUTROPHILS NFR BLD MANUAL: 50 % (ref 42.7–76)
PATHOLOGY REVIEW: YES
PLATELET # BLD AUTO: 76 10*3/MM3 (ref 140–450)
PMV BLD AUTO: 11.2 FL (ref 6–12)
RBC # BLD AUTO: 5.01 10*6/MM3 (ref 4.14–5.8)
RBC MORPH BLD: NORMAL
SCAN SLIDE: NORMAL
SMALL PLATELETS BLD QL SMEAR: ABNORMAL
SMUDGE CELLS BLD QL SMEAR: ABNORMAL
TIBC SERPL-MCNC: 499 MCG/DL (ref 298–536)
TRANSFERRIN SERPL-MCNC: 335 MG/DL (ref 200–360)
VARIANT LYMPHS NFR BLD MANUAL: 36 % (ref 19.6–45.3)
VIT B12 BLD-MCNC: 1604 PG/ML (ref 211–946)
WBC NRBC COR # BLD AUTO: 2.79 10*3/MM3 (ref 3.4–10.8)
WHOLE BLOOD HOLD COAG: NORMAL

## 2024-11-21 PROCEDURE — 36415 COLL VENOUS BLD VENIPUNCTURE: CPT | Performed by: NURSE PRACTITIONER

## 2024-11-21 PROCEDURE — 83540 ASSAY OF IRON: CPT | Performed by: NURSE PRACTITIONER

## 2024-11-21 PROCEDURE — G0463 HOSPITAL OUTPT CLINIC VISIT: HCPCS | Performed by: NURSE PRACTITIONER

## 2024-11-21 PROCEDURE — 82746 ASSAY OF FOLIC ACID SERUM: CPT | Performed by: NURSE PRACTITIONER

## 2024-11-21 PROCEDURE — 82607 VITAMIN B-12: CPT | Performed by: NURSE PRACTITIONER

## 2024-11-21 PROCEDURE — 84466 ASSAY OF TRANSFERRIN: CPT | Performed by: NURSE PRACTITIONER

## 2024-11-21 PROCEDURE — 82728 ASSAY OF FERRITIN: CPT | Performed by: NURSE PRACTITIONER

## 2024-11-21 PROCEDURE — 85025 COMPLETE CBC W/AUTO DIFF WBC: CPT | Performed by: NURSE PRACTITIONER

## 2024-11-21 RX ORDER — GLUCOSAMINE/D3/BOSWELLIA SERRA 1500MG-400
TABLET ORAL
COMMUNITY

## 2024-11-21 RX ORDER — AMOXICILLIN 500 MG
1200 CAPSULE ORAL DAILY
COMMUNITY

## 2024-11-21 RX ORDER — VITAMIN B COMPLEX
CAPSULE ORAL DAILY
COMMUNITY

## 2024-11-21 RX ORDER — DULAGLUTIDE 4.5 MG/.5ML
INJECTION, SOLUTION SUBCUTANEOUS
Qty: 8 ML | OUTPATIENT
Start: 2024-11-21

## 2024-11-21 RX ORDER — DULAGLUTIDE 4.5 MG/.5ML
4.5 INJECTION, SOLUTION SUBCUTANEOUS WEEKLY
Qty: 6 ML | Refills: 3 | Status: SHIPPED | OUTPATIENT
Start: 2024-11-21

## 2024-11-21 NOTE — PROGRESS NOTES
Chief Complaint/Reason for Referral:  Establish Care (Neutropenia, unspecified type/Thrombocytopenia/)    Charlotte Kulkarni DO Gusler, Kerry, DO    Records Obtained:  Records of the patients history including those obtained from  [facility] and patient information were reviewed and summarized in detail.    Subjective    History of Present Illness  Mr. Chaparro Weber is a very pleasant 69 year old  male who presents to hematology for thrombocytopenia and leukopenia. Referral is from his PCP: Charlotte Kulkarni MD.       PMH includes: HLD, type 2 DM, asthma, arthritis, COPD, non-ischemic cardiomyopathy, CHF, left BBB, kidney stone, GERD, Hypertension and fatty liver. Reports has ongoing fatigue since his back surgery 2 years ago. Used to play pickle ball, but not able to play in awhile due to the back pain. He avoids alcohol and and tobacco use. Follows with cardiology and orthopedics.       Lab work shows has had 2 lab work that has low WBC count down to 2.59 on 11/13/24 and again on 10/11/2024 and was 3.31. Absolute neutrophil count has been trending down since October of 2023. Most recent ANC on 11/13/2024 was 1150. Normal WBC and differential in 6/30/2023.  Lab work shows thrombocytopenia in the range of 66 to 138  since September of 2020. Has been trending downward recently in the 60-80's range. Reports occasionally when he bumps himself, his skin will start bleeding. Had prior anemia with hemoglobin down to 8.2 to 12.8 since June of 2023. Most recent lab work showed normal hemoglobin of 14.7. MCV is normal at 80.     Prior CT scan done in  June of 2023 showed fatty liver and an enlarged spleen. He has not had any recent imaging. FINDINGS: Limited images of the lower chest demonstrate tiny effusions and basilar atelectasis. The heart is normal in size. A pacemaker   defibrillator is in place. There is hepatic steatosis. There has been a prior cholecystectomy.   The pancreas and adrenal glands are normal. There is  mild splenomegaly. There is a 7 mm nonobstructing calculus within the lower pole of the left kidney with additional bilateral punctate   nonobstructing calculi. The bladder is normal. There is diverticulosis of the descending and sigmoid colon. There is no diverticulitis. The remainder of the colon is normal. The stomach   and small bowel are unremarkable. There is no obstruction. No free intraperitoneal gas or fluid is present. There is no lymphadenopathy. There is trace atherosclerotic calcification within the distal aorta and iliac arteries. Stranding is noted within the subcutaneous tissues   of the low anterior chest and upper abdomen.     IMPRESSION:   1. No acute abdominal or pelvic abnormality.   2. Postsurgical changes of L2-S1 anterior and posterior instrumented   fusion. No hematoma. Trace subcutaneous edema of the low anterior   chest and upper abdomen is of uncertain etiology but could be   secondary to positioning and surgery.   3. Tiny effusions and basilar atelectasis.   4. Hepatic steatosis.   5. Bilateral nephrolithiasis.   6. Diverticulosis.     Oncology/Hematology History    No history exists.       Review of Systems   Constitutional:  Positive for fatigue.   HENT: Negative.     Eyes: Negative.    Respiratory: Negative.     Cardiovascular: Negative.    Gastrointestinal: Negative.    Endocrine: Negative.    Genitourinary: Negative.    Musculoskeletal:  Positive for back pain.   Allergic/Immunologic: Negative.    Neurological: Negative.    Hematological:  Bruises/bleeds easily.   Psychiatric/Behavioral: Negative.  Negative for behavioral problems.        Current Outpatient Medications on File Prior to Visit   Medication Sig Dispense Refill    aspirin  MG tablet Take 1 tablet by mouth Daily. PT STATED DR CHEN OFFICE INSTRUCTED TO STOP 5 DAYS PRIOR TO SURGERY, LAST DOSE PT STATED INSTRUCTED TO TAKE IS 12-30-22      B Complex Vitamins (vitamin b complex) capsule capsule Take  by mouth Daily.       Biotin 53435 MCG tablet Take  by mouth.      celecoxib (CeleBREX) 100 MG capsule TAKE ONE CAPSULE BY MOUTH TWICE A DAY AS DIRECTED FOR 30 DAYS. 180 capsule 1    cholecalciferol (VITAMIN D3) 25 MCG (1000 UT) tablet Take 2 tablets by mouth Daily.      Continuous Glucose Sensor (FreeStyle Patel 3 Sensor) misc Use 1 each Every 14 (Fourteen) Days. 6 each 1    cyclobenzaprine (FLEXERIL) 5 MG tablet Take 1-2 tablets prn tid for muscle spasm 90 tablet 1    empagliflozin (Jardiance) 25 MG tablet tablet Take 1 tablet by mouth Daily for 180 days. 30 tablet 5    escitalopram (LEXAPRO) 20 MG tablet TAKE ONE TABLET BY MOUTH ONCE DAILY 30 tablet 1    fenofibrate (TRICOR) 145 MG tablet TAKE ONE TABLET BY MOUTH ONCE DAILY 90 tablet 1    glucose blood test strip Use as instructed 100 each 2    glucose blood test strip OneTouch Ultra Test strips      IBUPROFEN IB PO Take  by mouth.      Insulin Glargine (BASAGLAR KWIKPEN) 100 UNIT/ML injection pen Inject 90 Units under the skin into the appropriate area as directed Daily for 180 days. 81 mL 1    Insulin Pen Needle (Pen Needles) 32G X 5 MM misc Use 1 each 5 (Five) Times a Day. 450 each 1    Lyumjev KwikPen 200 UNIT/ML solution pen-injector Inject 40 Units under the skin into the appropriate area as directed 3 (Three) Times a Day Before Meals. 72 mL 4    methylcellulose, Laxative, (CITRUCEL) 500 MG tablet tablet       metoprolol succinate XL (TOPROL-XL) 25 MG 24 hr tablet Take 1 tablet by mouth Daily.      multivitamin with minerals (PRESERVISION AREDS PO) Take 1 tablet by mouth Daily.      NON FORMULARY Move free joint health      Omega-3 Fatty Acids (fish oil) 1200 MG capsule capsule Take 1 capsule by mouth Daily.      oxyCODONE-acetaminophen (PERCOCET)  MG per tablet Take 1 tablet by mouth Every 4 (Four) Hours As Needed.      pregabalin (LYRICA) 300 MG capsule Take 1 capsule by mouth Daily.      psyllium (Metamucil) 0.52 g capsule       QUERCETIN PO Take 1,000 mg by mouth  Daily.      rosuvastatin (CRESTOR) 10 MG tablet TAKE 1 TABLET BY MOUTH DAILY. 90 tablet 1    tamsulosin (FLOMAX) 0.4 MG capsule 24 hr capsule Take 2 capsules by mouth Daily. 180 capsule 4    vitamin B-12 (CYANOCOBALAMIN) 1000 MCG tablet Take 1,200 mcg by mouth Daily.      vitamin E 100 UNIT capsule Take 1 capsule by mouth Daily.      Zinc 50 MG capsule Take  by mouth.       No current facility-administered medications on file prior to visit.       Allergies   Allergen Reactions    Codeine Nausea Only and Nausea And Vomiting     Past Medical History:   Diagnosis Date    Anxiety     Arthritis     Asthma     - PT DENIED ASTHMA- NO INHALERS    Bradycardia     DEFBIRLLATOR/ PACEMAKER- MEDITRONIC- SEE'S DR. HAMEED DENIED CP/SOB    Chest pain     NO RECENT CP    CHF (congestive heart failure)     ASYMPTOMATIC    Condition not found     Hernia-Unspecified    Coronary artery disease     Deafness     Diabetes     BG RUNS AROUND 250-300 IN AM    HBP (high blood pressure)     Heart disease     High cholesterol     Kidney stones     Lung disease     Night sweats     Ringing in ear     Sleep apnea     Type 2 diabetes mellitus with autonomic neuropathy 07/16/2021     Past Surgical History:   Procedure Laterality Date    APPENDECTOMY      BACK SURGERY  2000,2010    lumbar with hardware    CARDIAC DEFIBRILLATOR PLACEMENT      MEDITRONIC    CHOLECYSTECTOMY      COLONOSCOPY N/A 06/22/2022    Procedure: COLONOSCOPY;  Surgeon: Nicholas Townsend MD;  Location: [facility] ENDOSCOPY;  Service: General;  Laterality: N/A;  DIVERTICULOSIS    CYSTOSCOPY W/ STONE MANIPULATION Right 1/5/2023    Procedure: CYSTOSCOPY KIDNEY STONE MANIPULATION/EXTRACTION;  Surgeon: Ann Mon MD;  Location: [facility] MAIN OR;  Service: Urology;  Laterality: Right;    KIDNEY STONE SURGERY      NECK SURGERY  2005    removal of bone spurs    PACEMAKER IMPLANTATION      10 YEARS AGO PLACED- "[employer]"    URETEROSCOPY LASER LITHOTRIPSY WITH STENT INSERTION Left  2023    Procedure: LEFT URETEROSCOPY LASER LITHOTRIPSY WITH STENT INSERTION AND RETROGRADE; RIGHT URETEROSCOPY WITH STENT INSERTION AND RETROGRADE;  Surgeon: Ann Mon MD;  Location: [facility];  Service: Urology;  Laterality: Left;     Social History     Socioeconomic History    Marital status:     Number of children: 2   Tobacco Use    Smoking status: Former     Current packs/day: 0.00     Average packs/day: 1.5 packs/day for 40.0 years (60.0 ttl pk-yrs)     Types: Cigarettes     Start date: 1965     Quit date:      Years since quittin.9     Passive exposure: Past    Smokeless tobacco: Never    Tobacco comments:     no second hand smoke exposure   Vaping Use    Vaping status: Never Used   Substance and Sexual Activity    Alcohol use: Never    Drug use: Never    Sexual activity: Defer     Family History   Problem Relation Age of Onset    Other Mother 60        Renal Cancer    Prostate cancer Father     Colon cancer Father 70    Malig Hyperthermia Neg Hx      Immunization History   Administered Date(s) Administered    Flu Vaccine Quad PF >36MO 2017, 2020    Fluzone (or Fluarix & Flulaval for VFC) >6mos 2017, 2020    Fluzone High-Dose 65+yrs 10/02/2023    Influenza Injectable Mdck Pf Quad 10/12/2017    Influenza, Unspecified 10/12/2017, 2020    Pneumococcal Conjugate 20-Valent (PCV20) 10/07/2024    Tdap 2019       Tobacco Use: Medium Risk (2024)    Patient History     Smoking Tobacco Use: Former     Smokeless Tobacco Use: Never     Passive Exposure: Past       Objective     Physical Exam  Vitals and nursing note reviewed.   Constitutional:       Appearance: Normal appearance.   HENT:      Head: Normocephalic.      Nose: Nose normal.      Mouth/Throat:      Mouth: Mucous membranes are moist.   Eyes:      Pupils: Pupils are equal, round, and reactive to light.   Cardiovascular:      Rate and Rhythm: Normal rate and regular rhythm.       "Pulses: Normal pulses.      Heart sounds: Normal heart sounds.   Pulmonary:      Effort: Pulmonary effort is normal. No respiratory distress.      Breath sounds: Normal breath sounds.   Abdominal:      General: Bowel sounds are normal. There is no distension.      Palpations: Abdomen is soft.   Musculoskeletal:         General: Normal range of motion.      Cervical back: Normal range of motion and neck supple.   Skin:     General: Skin is warm and dry.      Capillary Refill: Capillary refill takes less than 2 seconds.      Findings: Bruising present.   Neurological:      General: No focal deficit present.      Mental Status: He is alert and oriented to person, place, and time.   Psychiatric:         Mood and Affect: Mood normal.         Behavior: Behavior normal.         Thought Content: Thought content normal.         Judgment: Judgment normal.         Vitals:    11/21/24 1252   BP: 93/66   Pulse: 94   Resp: 20   Temp: 97.8 °F (36.6 °C)   TempSrc: Temporal   SpO2: 92%   Weight: 122 kg (268 lb 12.8 oz)   Height: 188 cm (74\")   PainSc: 0-No pain       Wt Readings from Last 3 Encounters:   11/21/24 122 kg (268 lb 12.8 oz)   10/07/24 121 kg (267 lb 9.6 oz)   09/24/24 119 kg (262 lb 12.8 oz)       ECOG score: 0         ECOG: (0) Fully Active - Able to Carry On All Pre-disease Performance Without Restriction  Fall Risk Assessment was completed, and patient is at low risk for falls.  PHQ-9 Total Score:         The patient is  experiencing fatigue. Fatigue score: 4    PT/OT Functional Screening: PT fx screen : No needs identified  Speech Functional Screening: Speech fx screen : No needs identified  Rehab to be ordered: Rehab to be ordered : No needs identified        Result Review :  The following data was reviewed by: ANGLE Vera on 11/21/2024:  Lab Results   Component Value Date    HGB 14.4 11/13/2024    HCT 43.9 11/13/2024    MCV 88.9 11/13/2024    PLT 66 (L) 11/13/2024    WBC 2.59 (L) 11/13/2024    " "NEUTROABS 1.15 (L) 11/13/2024    LYMPHSABS 1.08 11/13/2024    MONOSABS 0.31 11/13/2024    EOSABS 0.03 11/13/2024    BASOSABS 0.01 11/13/2024     Lab Results   Component Value Date    GLUCOSE 123 (H) 10/11/2024    BUN 20 10/11/2024    CREATININE 1.18 10/11/2024     10/11/2024    K 4.7 10/11/2024     10/11/2024    CO2 30.8 (H) 10/11/2024    CALCIUM 9.5 10/11/2024    PROTEINTOT 7.1 10/11/2024    ALBUMIN 4.4 10/11/2024    BILITOT 0.4 10/11/2024    ALKPHOS 46 10/11/2024    AST 39 10/11/2024    ALT 28 10/11/2024     Lab Results   Component Value Date    Folate 8.33 03/27/2023     No results found for: \"IRON\", \"LABIRON\", \"TRANSFERRIN\", \"TIBC\"  Lab Results   Component Value Date    XTOYVHJA17 1,577 (H) 03/27/2023    FOLATE 8.33 03/27/2023     Lab Results   Component Value Date    PSA <0.014 10/11/2024       XR Chest 2 View    Result Date: 10/12/2024  Elevated left hemidiaphragm with left basilar atelectasis or scarring. Electronically Signed: Qasim Vivar MD  10/12/2024 5:04 AM EDT  Workstation ID: [id number]           Assessment and Plan:  Diagnoses and all orders for this visit:    1. Splenomegaly, not elsewhere classified (Primary)  -     CBC & Differential  -     Comprehensive Metabolic Panel  -     Lactate Dehydrogenase  -     Iron Profile  -     Ferritin  -     Vitamin B12  -     Folate  -     Peripheral Blood Smear  -     Flow Cytometry, Blood  -     Cancel: Platelet Ct On Citrated Bld  -     Ambulatory Referral to Gastroenterology  -     US Abdomen Complete  -     Extra Tubes  -     Manual Differential  -     Pathology Consultation    2. Fatty liver  -     CBC & Differential  -     Comprehensive Metabolic Panel  -     Lactate Dehydrogenase  -     Iron Profile  -     Ferritin  -     Vitamin B12  -     Folate  -     Peripheral Blood Smear  -     Flow Cytometry, Blood  -     Cancel: Platelet Ct On Citrated Bld  -     Ambulatory Referral to Gastroenterology  -     US Abdomen Complete  -     Extra Tubes  -   "   Manual Differential  -     Pathology Consultation    3. Thrombocytopenia  -     CBC & Differential  -     Comprehensive Metabolic Panel  -     Lactate Dehydrogenase  -     Iron Profile  -     Ferritin  -     Vitamin B12  -     Folate  -     Peripheral Blood Smear  -     Flow Cytometry, Blood  -     Cancel: Platelet Ct On Citrated Bld  -     Ambulatory Referral to Gastroenterology  -     US Abdomen Complete  -     Extra Tubes  -     Manual Differential  -     Pathology Consultation    4. Leukopenia, unspecified type  -     Extra Tubes      Leukocytopenia on recent lab work with  worsening absolute neutropenia. ANC of 1150. Denies any fevers, chills, or weight loss. Also, with longstanding chronic thrombocytopenia since at least September of 2020. Likely, the leukopenia and the thrombocytopenia is secondary to fatty liver and splenomegaly seen on prior CT in 2023.  He notes he occasionally will have skin bleeding when he bumps into something and will bleed, but not persistent bleeding. He denies any acute GI blood loss, gums bleeding, nose bleeds.     We discussed any acute persistent bleeding or petechial rash  he would need to go to the ER to be evaluated. Denies any lymphadenopathy. No palpable splenomegaly on clinical exam. Denies any fevers or chills or chronic infections.     I have referred him to gastroenterology for evaluation for the fatty liver, splenomegaly to rule out any underlying liver cirrhosis.     Will plan to check flow cytometry to evaluate the leukopenia / neutropenia. Will check CBC with diff, peripheral blood smear, platelet on citrated tube, folate, B-12, iron panel, ferrtin, CMP, LDH.     He will follow up with NP in 4 weeks to discuss lab results and plan of care.       I spent 30 minutes caring for Chaparro on this date of service. This time includes time spent by me in the following activities:preparing for the visit, reviewing tests, obtaining and/or reviewing a separately obtained  history, performing a medically appropriate examination and/or evaluation , counseling and educating the patient/family/caregiver, ordering medications, tests, or procedures, referring and communicating with other health care professionals , documenting information in the medical record, and independently interpreting results and communicating that information with the patient/family/caregiver    Patient Follow Up: 4 weeks with MD.     Patient was given instructions and counseling regarding his condition or for health maintenance advice. Please see specific information pulled into the AVS if appropriate.     Olive Olivares, APRN    11/21/2024    .tob

## 2024-11-22 ENCOUNTER — LAB (OUTPATIENT)
Facility: HOSPITAL | Age: 69
End: 2024-11-22
Payer: MEDICARE

## 2024-11-22 DIAGNOSIS — R16.1 SPLENOMEGALY, NOT ELSEWHERE CLASSIFIED: ICD-10-CM

## 2024-11-22 DIAGNOSIS — D72.819 LEUKOPENIA, UNSPECIFIED TYPE: ICD-10-CM

## 2024-11-22 DIAGNOSIS — D69.6 THROMBOCYTOPENIA: ICD-10-CM

## 2024-11-22 DIAGNOSIS — K76.0 FATTY LIVER: ICD-10-CM

## 2024-11-22 DIAGNOSIS — D72.819 LEUKOPENIA, UNSPECIFIED TYPE: Primary | ICD-10-CM

## 2024-11-22 LAB
ALBUMIN SERPL-MCNC: 4.4 G/DL (ref 3.5–5.2)
ALBUMIN/GLOB SERPL: 1.6 G/DL
ALP SERPL-CCNC: 49 U/L (ref 39–117)
ALT SERPL W P-5'-P-CCNC: 31 U/L (ref 1–41)
ANION GAP SERPL CALCULATED.3IONS-SCNC: 11.1 MMOL/L (ref 5–15)
AST SERPL-CCNC: 43 U/L (ref 1–40)
BILIRUB SERPL-MCNC: 0.5 MG/DL (ref 0–1.2)
BUN SERPL-MCNC: 23 MG/DL (ref 8–23)
BUN/CREAT SERPL: 21.5 (ref 7–25)
CALCIUM SPEC-SCNC: 9 MG/DL (ref 8.6–10.5)
CHLORIDE SERPL-SCNC: 102 MMOL/L (ref 98–107)
CO2 SERPL-SCNC: 27.9 MMOL/L (ref 22–29)
CREAT SERPL-MCNC: 1.07 MG/DL (ref 0.76–1.27)
EGFRCR SERPLBLD CKD-EPI 2021: 76.1 ML/MIN/1.73
GLOBULIN UR ELPH-MCNC: 2.7 GM/DL
GLUCOSE SERPL-MCNC: 119 MG/DL (ref 65–99)
Lab: NORMAL
POTASSIUM SERPL-SCNC: 4.5 MMOL/L (ref 3.5–5.2)
PROT SERPL-MCNC: 7.1 G/DL (ref 6–8.5)
SODIUM SERPL-SCNC: 141 MMOL/L (ref 136–145)

## 2024-11-22 PROCEDURE — 80053 COMPREHEN METABOLIC PANEL: CPT | Performed by: NURSE PRACTITIONER

## 2024-12-11 DIAGNOSIS — E11.65 UNCONTROLLED TYPE 2 DIABETES MELLITUS WITH HYPERGLYCEMIA: ICD-10-CM

## 2024-12-12 ENCOUNTER — HOSPITAL ENCOUNTER (OUTPATIENT)
Dept: ULTRASOUND IMAGING | Facility: HOSPITAL | Age: 69
Discharge: HOME | End: 2024-12-12
Admitting: NURSE PRACTITIONER
Payer: MEDICARE

## 2024-12-12 PROCEDURE — 76705 ECHO EXAM OF ABDOMEN: CPT

## 2024-12-16 ENCOUNTER — OFFICE VISIT (OUTPATIENT)
Dept: DIABETES SERVICES | Facility: HOSPITAL | Age: 69
End: 2024-12-16
Payer: MEDICARE

## 2024-12-16 VITALS
WEIGHT: 266 LBS | HEART RATE: 81 BPM | HEIGHT: 74 IN | DIASTOLIC BLOOD PRESSURE: 71 MMHG | BODY MASS INDEX: 34.14 KG/M2 | OXYGEN SATURATION: 93 % | SYSTOLIC BLOOD PRESSURE: 118 MMHG

## 2024-12-16 DIAGNOSIS — Z79.4 TYPE 2 DIABETES MELLITUS WITH DIABETIC NEUROPATHY, WITH LONG-TERM CURRENT USE OF INSULIN: ICD-10-CM

## 2024-12-16 DIAGNOSIS — Z79.4 TYPE 2 DIABETES MELLITUS WITH STAGE 2 CHRONIC KIDNEY DISEASE, WITH LONG-TERM CURRENT USE OF INSULIN: ICD-10-CM

## 2024-12-16 DIAGNOSIS — E11.65 UNCONTROLLED TYPE 2 DIABETES MELLITUS WITH HYPERGLYCEMIA: Primary | ICD-10-CM

## 2024-12-16 DIAGNOSIS — N18.2 TYPE 2 DIABETES MELLITUS WITH STAGE 2 CHRONIC KIDNEY DISEASE, WITH LONG-TERM CURRENT USE OF INSULIN: ICD-10-CM

## 2024-12-16 DIAGNOSIS — E11.40 TYPE 2 DIABETES MELLITUS WITH DIABETIC NEUROPATHY, WITH LONG-TERM CURRENT USE OF INSULIN: ICD-10-CM

## 2024-12-16 DIAGNOSIS — Z97.8 USES SELF-APPLIED CONTINUOUS GLUCOSE MONITORING DEVICE: ICD-10-CM

## 2024-12-16 DIAGNOSIS — E11.22 TYPE 2 DIABETES MELLITUS WITH STAGE 2 CHRONIC KIDNEY DISEASE, WITH LONG-TERM CURRENT USE OF INSULIN: ICD-10-CM

## 2024-12-16 LAB
EXPIRATION DATE: ABNORMAL
GLUCOSE BLDC GLUCOMTR-MCNC: 137 MG/DL (ref 70–99)
HBA1C MFR BLD: 7.2 % (ref 4.5–5.7)
Lab: ABNORMAL

## 2024-12-16 PROCEDURE — 3074F SYST BP LT 130 MM HG: CPT | Performed by: NURSE PRACTITIONER

## 2024-12-16 PROCEDURE — 3078F DIAST BP <80 MM HG: CPT | Performed by: NURSE PRACTITIONER

## 2024-12-16 PROCEDURE — 95251 CONT GLUC MNTR ANALYSIS I&R: CPT | Performed by: NURSE PRACTITIONER

## 2024-12-16 PROCEDURE — 83036 HEMOGLOBIN GLYCOSYLATED A1C: CPT | Performed by: NURSE PRACTITIONER

## 2024-12-16 PROCEDURE — G0463 HOSPITAL OUTPT CLINIC VISIT: HCPCS | Performed by: NURSE PRACTITIONER

## 2024-12-16 PROCEDURE — 99214 OFFICE O/P EST MOD 30 MIN: CPT | Performed by: NURSE PRACTITIONER

## 2024-12-16 PROCEDURE — 82948 REAGENT STRIP/BLOOD GLUCOSE: CPT | Performed by: NURSE PRACTITIONER

## 2024-12-16 PROCEDURE — 1160F RVW MEDS BY RX/DR IN RCRD: CPT | Performed by: NURSE PRACTITIONER

## 2024-12-16 PROCEDURE — 3051F HG A1C>EQUAL 7.0%<8.0%: CPT | Performed by: NURSE PRACTITIONER

## 2024-12-16 PROCEDURE — 1159F MED LIST DOCD IN RCRD: CPT | Performed by: NURSE PRACTITIONER

## 2024-12-16 RX ORDER — LANCETS 33 GAUGE
EACH MISCELLANEOUS
Qty: 450 EACH | Refills: 1 | Status: SHIPPED | OUTPATIENT
Start: 2024-12-16

## 2024-12-16 NOTE — PROGRESS NOTES
Chief Complaint  Med Management and Follow-up (Cgm evaluation, A1c evaluation)    Referred By: Charlotte Kulkarni DO    Subjective          Patient or patient representative verbalized consent for the use of Ambient Listening during the visit with  ANGLE Mcdonnell for chart documentation. 12/16/2024  11:28 EDIN Weber presents to [facility] for diabetes medication management    History of Present Illness    Visit type:  follow-up  Diabetes type:  Type 2  Current diabetes status/concerns/issues:     History of Present Illness  The patient presents for evaluation of diabetes mellitus, and back pain.    He has been managing his diabetes effectively with Jardiance 25 mg daily, Lyumjev 25 to 35 units with meals, Trulicity 4.5 mg once weekly, and Basaglar 50 units in the morning and evening. He occasionally skips breakfast and takes his Basaglar around the same time each day. He admits to sometimes taking his insulin after meals.    He has been under the care of a hematologist due to a persistently low platelet count. He reports experiencing fatigue and shortness of breath, which he attributes to his back surgery. Despite maintaining satisfactory A1c levels, he reports a general sense of malaise. He has undergone an ultrasound but has not yet received the results. He was referred to an oncologist who ordered several blood test. He has a follow-up appointment scheduled with her in early 2025.  He has gained 30 pounds since his last visit possibly due to inability to exercise.  He also reports his testosterone level is 40, but he is unable to take testosterone due to his pacemaker defibrillator and cardiologist not wanting him on this medication.       Current Diabetes symptoms:    Polyuria: No   Polydipsia: No   Polyphagia: No   Blurred vision: No   Excessive fatigue: No  Known Diabetes complications:  Neuropathy: Tingling; Location: Feet  Renal: Stage II mild (GFR = 60-89  mL/min) and Microalbuminuria - NEGATIVE  Eyes: No current eye exam available in record; Location: N/A  Amputation/Wounds: None  GI: None  Cardiovascular: Hypertension, Hyperlipidemia, CHF and Cardiomyopathy  ED: None  Other: None  Hypoglycemia:  None reported at this time  Hypoglycemia Symptoms:  No hypoglycemia at this time  and Lyumjev insulin before each meal taking 8, 10, or 12 units based on small, medium, or large amounts of carbohydrates plus adding correction for glucose levels greater than 150 mg/dL starting with 2 units and increasing by 1 unit for every 25 mg/dL thereafter.  He states he is taking 25-35 units of Lyumjev with each meal.  Jardiance 25 mg once a day.  Trulicity 4.5 mg once weekly.  Basaglar 50 units twice a day  Blood glucose device:  [employer] CGM  Blood glucose monitoring frequency:  Continuous per CGM  Blood glucose range/average:  The 14-day sensor report shows an average glucose of 185 mg/dL, with 51% in target range ( mgdL), 40 % in the high range (181-250 mg/dL), 9% in the very high range (>250 mg/dL), 0% in the low range (54-70 mg/dL) and 0% in the very low range (<54 mg/dL).   Glucose Source: Device Reviewed  Diet:  Limits high carb/sweet foods, Avoids sugary drinks  Activity/Exercise:  None    Past Medical History:   Diagnosis Date    Anxiety     Arthritis     Asthma     - PT DENIED ASTHMA- NO INHALERS    Bradycardia     DEFBIRLLATOR/ PACEMAKER- MEDITRONIC- SEE'S DR. HAMEED DENIED CP/SOB    Chest pain     NO RECENT CP    CHF (congestive heart failure)     ASYMPTOMATIC    Condition not found     Hernia-Unspecified    Coronary artery disease     Deafness     Diabetes     BG RUNS AROUND 250-300 IN AM    HBP (high blood pressure)     Heart disease     High cholesterol     Kidney stones     Lung disease     Night sweats     Ringing in ear     Sleep apnea     Type 2 diabetes mellitus with autonomic neuropathy 07/16/2021     Past Surgical History:   Procedure Laterality Date     APPENDECTOMY      BACK SURGERY  ,    lumbar with hardware    CARDIAC DEFIBRILLATOR PLACEMENT      MEDITRONIC    CHOLECYSTECTOMY      COLONOSCOPY N/A 2022    Procedure: COLONOSCOPY;  Surgeon: Nicholas Townsend MD;  Location: [facility] ENDOSCOPY;  Service: General;  Laterality: N/A;  DIVERTICULOSIS    CYSTOSCOPY W/ STONE MANIPULATION Right 2023    Procedure: CYSTOSCOPY KIDNEY STONE MANIPULATION/EXTRACTION;  Surgeon: Ann Mon MD;  Location: [facility] MAIN OR;  Service: Urology;  Laterality: Right;    KIDNEY STONE SURGERY      NECK SURGERY      removal of bone spurs    PACEMAKER IMPLANTATION      10 YEARS AGO PLACED- MEDITRONIC    URETEROSCOPY LASER LITHOTRIPSY WITH STENT INSERTION Left 2023    Procedure: LEFT URETEROSCOPY LASER LITHOTRIPSY WITH STENT INSERTION AND RETROGRADE; RIGHT URETEROSCOPY WITH STENT INSERTION AND RETROGRADE;  Surgeon: Ann Mon MD;  Location: [facility] MAIN OR;  Service: Urology;  Laterality: Left;     Family History   Problem Relation Age of Onset    Other Mother 60        Renal Cancer    Prostate cancer Father     Colon cancer Father 70    Malig Hyperthermia Neg Hx      Social History     Socioeconomic History    Marital status:     Number of children: 2   Tobacco Use    Smoking status: Former     Current packs/day: 0.00     Average packs/day: 1.5 packs/day for 40.0 years (60.0 ttl pk-yrs)     Types: Cigarettes     Start date: 1965     Quit date:      Years since quittin.9     Passive exposure: Past    Smokeless tobacco: Never    Tobacco comments:     no second hand smoke exposure   Vaping Use    Vaping status: Never Used   Substance and Sexual Activity    Alcohol use: Never    Drug use: Never    Sexual activity: Defer     Allergies   Allergen Reactions    Codeine Nausea Only and Nausea And Vomiting       Current Outpatient Medications:     aspirin  MG tablet, Take 1 tablet by mouth Daily. PT STATED DR MON OFFICE INSTRUCTED TO STOP  5 DAYS PRIOR TO SURGERY, LAST DOSE PT STATED INSTRUCTED TO TAKE IS 12-30-22, Disp: , Rfl:     B Complex Vitamins (vitamin b complex) capsule capsule, Take  by mouth Daily., Disp: , Rfl:     Biotin 56012 MCG tablet, Take  by mouth., Disp: , Rfl:     celecoxib (CeleBREX) 100 MG capsule, TAKE ONE CAPSULE BY MOUTH TWICE A DAY AS DIRECTED FOR 30 DAYS., Disp: 180 capsule, Rfl: 1    cholecalciferol (VITAMIN D3) 25 MCG (1000 UT) tablet, Take 2 tablets by mouth Daily., Disp: , Rfl:     Continuous Glucose Sensor (FreeStyle Patel 3 Sensor) misc, Use 1 each Every 14 (Fourteen) Days., Disp: 6 each, Rfl: 1    cyclobenzaprine (FLEXERIL) 5 MG tablet, Take 1-2 tablets prn tid for muscle spasm, Disp: 90 tablet, Rfl: 1    Dulaglutide (Trulicity) 4.5 MG/0.5ML solution auto-injector, Inject 4.5 mg under the skin into the appropriate area as directed 1 (One) Time Per Week., Disp: 6 mL, Rfl: 3    empagliflozin (Jardiance) 25 MG tablet tablet, Take 1 tablet by mouth Daily for 180 days., Disp: 30 tablet, Rfl: 5    escitalopram (LEXAPRO) 20 MG tablet, TAKE ONE TABLET BY MOUTH ONCE DAILY, Disp: 30 tablet, Rfl: 1    fenofibrate (TRICOR) 145 MG tablet, TAKE ONE TABLET BY MOUTH ONCE DAILY, Disp: 90 tablet, Rfl: 1    glucose blood test strip, Use as instructed, Disp: 100 each, Rfl: 2    glucose blood test strip, OneTouch Ultra Test strips, Disp: , Rfl:     IBUPROFEN IB PO, Take  by mouth., Disp: , Rfl:     Insulin Glargine (BASAGLAR KWIKPEN) 100 UNIT/ML injection pen, Inject 90 Units under the skin into the appropriate area as directed Daily for 180 days., Disp: 81 mL, Rfl: 1    Insulin Pen Needle (Pen Needles) 32G X 5 MM misc, Use 1 each 5 (Five) Times a Day., Disp: 450 each, Rfl: 1    Lyumjev KwikPen 200 UNIT/ML solution pen-injector, Inject 40 Units under the skin into the appropriate area as directed 3 (Three) Times a Day Before Meals., Disp: 72 mL, Rfl: 4    methylcellulose, Laxative, (CITRUCEL) 500 MG tablet tablet, , Disp: , Rfl:      "metoprolol succinate XL (TOPROL-XL) 25 MG 24 hr tablet, Take 1 tablet by mouth Daily., Disp: , Rfl:     multivitamin with minerals (PRESERVISION AREDS PO), Take 1 tablet by mouth Daily., Disp: , Rfl:     NON FORMULARY, Move free joint health, Disp: , Rfl:     Omega-3 Fatty Acids (fish oil) 1200 MG capsule capsule, Take 1 capsule by mouth Daily., Disp: , Rfl:     oxyCODONE-acetaminophen (PERCOCET)  MG per tablet, Take 1 tablet by mouth Every 4 (Four) Hours As Needed., Disp: , Rfl:     pregabalin (LYRICA) 300 MG capsule, Take 1 capsule by mouth Daily., Disp: , Rfl:     psyllium (Metamucil) 0.52 g capsule, , Disp: , Rfl:     QUERCETIN PO, Take 1,000 mg by mouth Daily., Disp: , Rfl:     rosuvastatin (CRESTOR) 10 MG tablet, TAKE 1 TABLET BY MOUTH DAILY., Disp: 90 tablet, Rfl: 1    tamsulosin (FLOMAX) 0.4 MG capsule 24 hr capsule, Take 2 capsules by mouth Daily., Disp: 180 capsule, Rfl: 4    vitamin B-12 (CYANOCOBALAMIN) 1000 MCG tablet, Take 1,200 mcg by mouth Daily., Disp: , Rfl:     vitamin E 100 UNIT capsule, Take 1 capsule by mouth Daily., Disp: , Rfl:     Zinc 50 MG capsule, Take  by mouth., Disp: , Rfl:     Objective     Vitals:    12/16/24 1054   BP: 118/71   Pulse: 81   SpO2: 93%   Weight: 121 kg (266 lb)   Height: 188 cm (74\")   PainSc:   4   PainLoc: Generalized     Body mass index is 34.15 kg/m².    Physical Exam  Constitutional:       Appearance: Normal appearance. He is obese.      Comments: Obesity (BMI 30 - 39.9) Pt Current BMI = 34.15    HENT:      Head: Normocephalic and atraumatic.      Right Ear: External ear normal.      Left Ear: External ear normal.      Nose: Nose normal.   Eyes:      Extraocular Movements: Extraocular movements intact.      Conjunctiva/sclera: Conjunctivae normal.   Pulmonary:      Effort: Pulmonary effort is normal.   Musculoskeletal:         General: Normal range of motion.      Cervical back: Normal range of motion.   Skin:     General: Skin is warm and dry. "   Neurological:      General: No focal deficit present.      Mental Status: He is alert and oriented to person, place, and time. Mental status is at baseline.   Psychiatric:         Mood and Affect: Mood normal.         Behavior: Behavior normal.         Thought Content: Thought content normal.         Judgment: Judgment normal.             Result Review :   The following data was reviewed by: ANGLE Mcdonnell on 12/16/2024:    Most Recent A1C          12/16/2024    11:18   HGBA1C Most Recent   Hemoglobin A1C 7.2        A1C Last 3 Results          6/13/2024    11:45 9/24/2024    11:37 12/16/2024    11:18   HGBA1C Last 3 Results   Hemoglobin A1C 8.3  7.1  7.2      A1c collected in the office today is 7.2%, indicating Uncontrolled Type II diabetes.  This result is up from the prior result of 7.1% collected on 9/24/24     Glucose   Date Value Ref Range Status   12/16/2024 137 (H) 70 - 99 mg/dL Final     Comment:     Serial Number: [id number]:  [id number]     Point of care glucose in the office today is  elevated at 187 mg/dL    Creatinine   Date Value Ref Range Status   11/22/2024 1.07 0.76 - 1.27 mg/dL Final   10/11/2024 1.18 0.76 - 1.27 mg/dL Final     eGFR   Date Value Ref Range Status   11/22/2024 76.1 >60.0 mL/min/1.73 Final   10/11/2024 67.6 >60.0 mL/min/1.73 Final     Labs collected on 11/22/24 show Stage II mild (GFR = 60-89mL/min)    Microalbumin, Urine   Date Value Ref Range Status   10/11/2024 <1.2 mg/dL Final   06/14/2023 <1.2 mg/dL Final     Creatinine, Urine   Date Value Ref Range Status   10/11/2024 64.5 mg/dL Final   06/14/2023 64.6 mg/dL Final     Microalbumin/Creatinine Ratio   Date Value Ref Range Status   10/11/2024   Final     Comment:     Unable to calculate   06/14/2023   Final     Comment:     Unable to calculate     Urine microalbuminuria collected on 10/11/24 is negative for microalbuminuria    Total Cholesterol   Date Value Ref Range Status   10/11/2024 142 0 - 200 mg/dL Final    10/02/2023 127 0 - 200 mg/dL Final     Triglycerides   Date Value Ref Range Status   10/11/2024 195 (H) 0 - 150 mg/dL Final   10/02/2023 225 (H) 0 - 150 mg/dL Final     HDL Cholesterol   Date Value Ref Range Status   10/11/2024 36 (L) 40 - 60 mg/dL Final   10/02/2023 25 (L) 40 - 60 mg/dL Final     LDL Cholesterol    Date Value Ref Range Status   10/11/2024 73 0 - 100 mg/dL Final   10/02/2023 65 0 - 100 mg/dL Final     Lipid panel collected on 10/11/24 shows Hypertriglyceridemia and low HDL              Diagnoses and all orders for this visit:    1. Uncontrolled type 2 diabetes mellitus with hyperglycemia (Primary)  -     POC Glycosylated Hemoglobin (Hb A1C)    2. Type 2 diabetes mellitus with diabetic neuropathy, with long-term current use of insulin    3. Type 2 diabetes mellitus with stage 2 chronic kidney disease, with long-term current use of insulin    4. Uses self-applied continuous glucose monitoring device    Other orders  -     POC Glucose        Assessment & Plan  1. Diabetes Mellitus.  His continuous glucose monitoring (CGM) report indicates an average glucose level of 185, with 51% of readings within the target range. He experiences hyperglycemia in the morning and around noon, suggesting a possible anni phenomenon. His current A1c level is 7.2, a slight increase from 7.1 in September 2024. His morning dose of Basaglar will be increased to 55 units, while the evening dose will remain at 50 units. He is advised to administer Lyumjev prior to meals to prevent postprandial hyperglycemia. He will continue with Jardiance 25 mg once daily and Trulicity 4.5 mg once weekly. He is instructed to inform us if there are any changes needed in his medication regimen due to his low platelet count and white blood cell count.      2. Thrombocytopenia.  His white blood cell count is significantly reduced, which could potentially contribute to his fatigue. The etiology of his low platelet count remains undetermined,  necessitating further diagnostic investigations. He is scheduled for a follow-up appointment with Hematology Oncology on 01/03/2025.            The patient will monitor his blood glucose levels using his CGM.  If he develops problematic hyperglycemia or hypoglycemia or adverse drug reactions, he will contact the office for further instructions.        Follow Up     Return in about 3 months (around 3/16/2025) for Medication Management, CGM Follow-up.    Patient was given instructions and counseling regarding his condition or for health maintenance advice. Please see specific information pulled into the AVS if appropriate.     Suzanna Agosto, APRN  12/16/2024        Dictated Utilizing Dragon Dictation.  Please note that portions of this note were completed with a voice recognition program.  Part of this note may be an electronic transcription/translation of spoken language to printed text using the Dragon Dictation System.

## 2025-01-02 RX ORDER — ESCITALOPRAM OXALATE 20 MG/1
TABLET ORAL
Qty: 30 TABLET | Refills: 1 | Status: SHIPPED | OUTPATIENT
Start: 2025-01-02

## 2025-01-03 ENCOUNTER — HOSPITAL ENCOUNTER (OUTPATIENT)
Dept: GENERAL RADIOLOGY | Facility: HOSPITAL | Age: 70
Discharge: HOME | End: 2025-01-03
Payer: MEDICARE

## 2025-01-03 ENCOUNTER — OFFICE VISIT (OUTPATIENT)
Dept: ONCOLOGY | Facility: HOSPITAL | Age: 70
End: 2025-01-03
Payer: MEDICARE

## 2025-01-03 VITALS
SYSTOLIC BLOOD PRESSURE: 105 MMHG | OXYGEN SATURATION: 93 % | WEIGHT: 263.23 LBS | DIASTOLIC BLOOD PRESSURE: 76 MMHG | HEIGHT: 74 IN | HEART RATE: 82 BPM | RESPIRATION RATE: 16 BRPM | TEMPERATURE: 97.3 F | BODY MASS INDEX: 33.78 KG/M2

## 2025-01-03 DIAGNOSIS — D69.6 THROMBOCYTOPENIA: ICD-10-CM

## 2025-01-03 DIAGNOSIS — K76.0 FATTY LIVER: ICD-10-CM

## 2025-01-03 DIAGNOSIS — R16.1 SPLENOMEGALY: ICD-10-CM

## 2025-01-03 DIAGNOSIS — N20.0 KIDNEY STONE: ICD-10-CM

## 2025-01-03 DIAGNOSIS — D72.819 LEUKOPENIA, UNSPECIFIED TYPE: Primary | ICD-10-CM

## 2025-01-03 PROCEDURE — 99214 OFFICE O/P EST MOD 30 MIN: CPT | Performed by: INTERNAL MEDICINE

## 2025-01-03 PROCEDURE — 3074F SYST BP LT 130 MM HG: CPT | Performed by: INTERNAL MEDICINE

## 2025-01-03 PROCEDURE — 74018 RADEX ABDOMEN 1 VIEW: CPT

## 2025-01-03 PROCEDURE — 3078F DIAST BP <80 MM HG: CPT | Performed by: INTERNAL MEDICINE

## 2025-01-03 PROCEDURE — 1126F AMNT PAIN NOTED NONE PRSNT: CPT | Performed by: INTERNAL MEDICINE

## 2025-01-03 NOTE — PROGRESS NOTES
Chief Complaint/Reason for Referral:  FOLLOW UP 2     Charlotte Kulkarni DO Gusler, Kerry, DO    Records Obtained:  Records of the patients history including those obtained from  [facility] and patient information were reviewed and summarized in detail.    Subjective    History of Present Illness  Mr. Chaparro Weber is a very pleasant 69 year old  male who presents to hematology for thrombocytopenia and leukopenia. His PMH includes: HLD, type 2 DM, asthma, arthritis, COPD, non-ischemic cardiomyopathy, CHF, left BBB, kidney stone, GERD, Hypertension and fatty liver. Reports has ongoing fatigue since his back surgery 6/2023. He avoids alcohol and and tobacco use.  Reports weakening of his legs. Present despite prior PT.  Reports he gets short of breath on exertion.  Reports he feels hot at times but no fevers or chills.  No lymph node swelling.  No new cough or chest pain reported.  No rash reported.  Has ongoing back pain that limits his activity along with the fatigue.  Not as active as he was before the surgery in June 2023. No frequent infections or bleeding reported.       Hematology History  Lab work shows has had twice lab work that has low WBC count down to 2.59 on 11/13/24 and again on 10/11/2024 and was 3.31. Absolute neutrophil count has been trending down since October of 2023. Most recent ANC on 11/13/2024 was 1150. Normal WBC and differential in 6/30/2023.  Lab work shows thrombocytopenia in the range of 66 to 138  since September of 2020. Has been trending downward recently in the 60-80's range. Reports occasionally when he bumps himself, his skin will start bleeding. Had prior anemia with hemoglobin down to 8.2 to 12.8 since June of 2023. Recent lab work showed normal hemoglobin of 14.7. MCV is normal at 80.     Prior CT scan done in  June of 2023 showed fatty liver and an enlarged spleen. He has not had any recent imaging. FINDINGS: Limited images of the lower chest demonstrate tiny effusions and  basilar atelectasis. The heart is normal in size. A pacemaker defibrillator is in place. There is hepatic steatosis. There has been a prior cholecystectomy. The pancreas and adrenal glands are normal. There is mild splenomegaly. There is a 7 mm nonobstructing calculus within the lower pole of the left kidney with additional bilateral punctate nonobstructing calculi. The bladder is normal. There is diverticulosis of the descending and sigmoid colon. There is no diverticulitis. The remainder of the colon is normal. The stomach and small bowel are unremarkable. There is no obstruction. No free intraperitoneal gas or fluid is present. There is no lymphadenopathy. There is trace atherosclerotic calcification within the distal aorta and iliac arteries. Stranding is noted within the subcutaneous tissues of the low anterior chest and upper abdomen.     11/21/24: WBC 2.79, hgb 14.4, plt 76, MCV 91.4, ANC 1.4, Ferritin 101, iron sat 17, B12 1604, folate >20, smear with absolute neutrophilia but normal morphology of all cell lines, flow cytometry was normal        Oncology/Hematology History    No history exists.       Review of Systems   Constitutional:  Positive for fatigue. Negative for appetite change, diaphoresis, fever, unexpected weight gain and unexpected weight loss.   HENT: Negative.  Negative for hearing loss, mouth sores, sore throat, swollen glands, trouble swallowing and voice change.    Eyes: Negative.  Negative for blurred vision.   Respiratory: Negative.  Negative for cough, shortness of breath and wheezing.    Cardiovascular: Negative.  Negative for chest pain and palpitations.   Gastrointestinal: Negative.  Negative for abdominal pain, blood in stool, constipation, diarrhea, nausea and vomiting.   Endocrine: Negative.  Negative for cold intolerance and heat intolerance.   Genitourinary: Negative.  Negative for difficulty urinating, dysuria, frequency, hematuria and urinary incontinence.   Musculoskeletal:   Positive for back pain. Negative for arthralgias and myalgias.   Skin:  Negative for rash, skin lesions and wound.   Allergic/Immunologic: Negative.    Neurological: Negative.  Negative for dizziness, seizures, weakness, numbness and headache.   Hematological:  Bruises/bleeds easily.   Psychiatric/Behavioral: Negative.  Negative for behavioral problems and depressed mood. The patient is not nervous/anxious.    All other systems reviewed and are negative.      Current Outpatient Medications on File Prior to Visit   Medication Sig Dispense Refill    aspirin  MG tablet Take 1 tablet by mouth Daily. PT STATED DR CHEN OFFICE INSTRUCTED TO STOP 5 DAYS PRIOR TO SURGERY, LAST DOSE PT STATED INSTRUCTED TO TAKE IS 12-30-22      B Complex Vitamins (vitamin b complex) capsule capsule Take  by mouth Daily.      Biotin 85746 MCG tablet Take  by mouth.      celecoxib (CeleBREX) 100 MG capsule TAKE ONE CAPSULE BY MOUTH TWICE A DAY AS DIRECTED FOR 30 DAYS. 180 capsule 1    cholecalciferol (VITAMIN D3) 25 MCG (1000 UT) tablet Take 2 tablets by mouth Daily.      Continuous Glucose Sensor (FreeStyle Patel 3 Sensor) [facility] Use 1 each Every 14 (Fourteen) Days. 6 each 1    cyclobenzaprine (FLEXERIL) 5 MG tablet Take 1-2 tablets prn tid for muscle spasm 90 tablet 1    Dulaglutide (Trulicity) 4.5 MG/0.5ML solution auto-injector Inject 4.5 mg under the skin into the appropriate area as directed 1 (One) Time Per Week. 6 mL 3    empagliflozin (Jardiance) 25 MG tablet tablet Take 1 tablet by mouth Daily for 180 days. 30 tablet 5    escitalopram (LEXAPRO) 20 MG tablet TAKE ONE TABLET BY MOUTH ONCE DAILY 30 tablet 1    fenofibrate (TRICOR) 145 MG tablet TAKE ONE TABLET BY MOUTH ONCE DAILY 90 tablet 1    glucose blood test strip Use as instructed 100 each 2    glucose blood test strip OneTouch Ultra Test strips      IBUPROFEN IB PO Take  by mouth.      Insulin Glargine (BASAGLAR KWIKPEN) 100 UNIT/ML injection pen Inject 90 Units under the  skin into the appropriate area as directed Daily for 180 days. 81 mL 1    Insulin Pen Needle (Comfort EZ Pen Needles) 32G X 5 MM misc USE 1 EACH 5 (FIVE) TIMES A DAY. 450 each 1    Lyumjev KwikPen 200 UNIT/ML solution pen-injector Inject 40 Units under the skin into the appropriate area as directed 3 (Three) Times a Day Before Meals. 72 mL 4    methylcellulose, Laxative, (CITRUCEL) 500 MG tablet tablet       metoprolol succinate XL (TOPROL-XL) 25 MG 24 hr tablet Take 1 tablet by mouth Daily.      multivitamin with minerals (PRESERVISION AREDS PO) Take 1 tablet by mouth Daily.      NON FORMULARY Move free joint health      Omega-3 Fatty Acids (fish oil) 1200 MG capsule capsule Take 1 capsule by mouth Daily.      oxyCODONE-acetaminophen (PERCOCET)  MG per tablet Take 1 tablet by mouth Every 4 (Four) Hours As Needed.      pregabalin (LYRICA) 300 MG capsule Take 1 capsule by mouth Daily.      psyllium (Metamucil) 0.52 g capsule       QUERCETIN PO Take 1,000 mg by mouth Daily.      rosuvastatin (CRESTOR) 10 MG tablet TAKE 1 TABLET BY MOUTH DAILY. 90 tablet 1    tamsulosin (FLOMAX) 0.4 MG capsule 24 hr capsule Take 2 capsules by mouth Daily. 180 capsule 4    vitamin B-12 (CYANOCOBALAMIN) 1000 MCG tablet Take 1,200 mcg by mouth Daily.      vitamin E 100 UNIT capsule Take 1 capsule by mouth Daily.      Zinc 50 MG capsule Take  by mouth.       No current facility-administered medications on file prior to visit.       Allergies   Allergen Reactions    Codeine Nausea Only and Nausea And Vomiting     Past Medical History:   Diagnosis Date    Anxiety     Arthritis     Asthma     - PT DENIED ASTHMA- NO INHALERS    Bradycardia     DEFBIRLLATOR/ PACEMAKER- MEDITRONIC- SEE'S DR. HAMEED DENIED CP/SOB    Chest pain     NO RECENT CP    CHF (congestive heart failure)     ASYMPTOMATIC    Condition not found     Hernia-Unspecified    Coronary artery disease     Deafness     Diabetes     BG RUNS AROUND 250-300 IN AM    HBP (high  blood pressure)     Heart disease     High cholesterol     Kidney stones     Lung disease     Night sweats     Ringing in ear     Sleep apnea     Type 2 diabetes mellitus with autonomic neuropathy 2021     Past Surgical History:   Procedure Laterality Date    APPENDECTOMY      BACK SURGERY  ,    lumbar with hardware    CARDIAC DEFIBRILLATOR PLACEMENT      MEDITRONIC    CHOLECYSTECTOMY      COLONOSCOPY N/A 2022    Procedure: COLONOSCOPY;  Surgeon: Nicholas Townsned MD;  Location: [facility] ENDOSCOPY;  Service: General;  Laterality: N/A;  DIVERTICULOSIS    CYSTOSCOPY W/ STONE MANIPULATION Right 2023    Procedure: CYSTOSCOPY KIDNEY STONE MANIPULATION/EXTRACTION;  Surgeon: Ann Mon MD;  Location: [facility] MAIN OR;  Service: Urology;  Laterality: Right;    KIDNEY STONE SURGERY      NECK SURGERY      removal of bone spurs    PACEMAKER IMPLANTATION      10 YEARS AGO PLACED- MEDITRONIC    URETEROSCOPY LASER LITHOTRIPSY WITH STENT INSERTION Left 2023    Procedure: LEFT URETEROSCOPY LASER LITHOTRIPSY WITH STENT INSERTION AND RETROGRADE; RIGHT URETEROSCOPY WITH STENT INSERTION AND RETROGRADE;  Surgeon: Ann Mon MD;  Location: [facility] MAIN OR;  Service: Urology;  Laterality: Left;     Social History     Socioeconomic History    Marital status:     Number of children: 2   Tobacco Use    Smoking status: Former     Current packs/day: 0.00     Average packs/day: 1.5 packs/day for 40.0 years (60.0 ttl pk-yrs)     Types: Cigarettes     Start date: 1965     Quit date:      Years since quittin.0     Passive exposure: Past    Smokeless tobacco: Never    Tobacco comments:     no second hand smoke exposure   Vaping Use    Vaping status: Never Used   Substance and Sexual Activity    Alcohol use: Never    Drug use: Never    Sexual activity: Defer     Family History   Problem Relation Age of Onset    Other Mother 60        Renal Cancer    Prostate cancer Father     Colon cancer  "Father 70    Malig Hyperthermia Neg Hx      Immunization History   Administered Date(s) Administered    Flu Vaccine Quad PF >36MO 01/24/2017, 09/09/2020    Fluzone (or Fluarix & Flulaval for VFC) >6mos 01/24/2017, 09/09/2020    Fluzone High-Dose 65+yrs 10/02/2023    Influenza Injectable Mdck Pf Quad 10/12/2017    Influenza, Unspecified 10/12/2017, 09/02/2020    Pneumococcal Conjugate 20-Valent (PCV20) 10/07/2024    Tdap 07/16/2019       Tobacco Use: Medium Risk (1/3/2025)    Patient History     Smoking Tobacco Use: Former     Smokeless Tobacco Use: Never     Passive Exposure: Past       Objective     Physical Exam  Constitutional:       Appearance: Normal appearance.   HENT:      Head: Normocephalic and atraumatic.      Nose: Nose normal.   Eyes:      Conjunctiva/sclera: Conjunctivae normal.   Pulmonary:      Effort: Pulmonary effort is normal.   Neurological:      General: No focal deficit present.      Mental Status: He is alert. Mental status is at baseline.   Psychiatric:         Mood and Affect: Mood normal.         Behavior: Behavior normal.         Thought Content: Thought content normal.         Vitals:    01/03/25 0800   BP: 105/76   Pulse: 82   Resp: 16   Temp: 97.3 °F (36.3 °C)   TempSrc: Temporal   SpO2: 93%   Weight: 119 kg (263 lb 3.7 oz)   Height: 188 cm (74.02\")   PainSc: 0-No pain         Wt Readings from Last 3 Encounters:   12/16/24 121 kg (266 lb)   11/21/24 122 kg (268 lb 12.8 oz)   10/07/24 121 kg (267 lb 9.6 oz)                 ECOG: (0) Fully Active - Able to Carry On All Pre-disease Performance Without Restriction  Fall Risk Assessment was completed, and patient is at low risk for falls.  PHQ-9 Total Score:         The patient is  experiencing fatigue. Fatigue score: 4    PT/OT Functional Screening: PT fx screen : No needs identified  Speech Functional Screening: Speech fx screen : No needs identified  Rehab to be ordered: Rehab to be ordered : No needs identified        Result Review :  The " following data was reviewed by: Lawson Saini MD on 01/03/25:  Lab Results   Component Value Date    HGB 14.4 11/21/2024    HCT 45.8 11/21/2024    MCV 91.4 11/21/2024    PLT 76 (L) 11/21/2024    WBC 2.79 (L) 11/21/2024    NEUTROABS 1.40 (L) 11/21/2024    LYMPHSABS 1.08 11/13/2024    MONOSABS 0.31 11/13/2024    EOSABS 0.11 11/21/2024    BASOSABS 0.01 11/13/2024     Lab Results   Component Value Date    GLUCOSE 119 (H) 11/22/2024    BUN 23 11/22/2024    CREATININE 1.07 11/22/2024     11/22/2024    K 4.5 11/22/2024     11/22/2024    CO2 27.9 11/22/2024    CALCIUM 9.0 11/22/2024    PROTEINTOT 7.1 11/22/2024    ALBUMIN 4.4 11/22/2024    BILITOT 0.5 11/22/2024    ALKPHOS 49 11/22/2024    AST 43 (H) 11/22/2024    ALT 31 11/22/2024     Lab Results   Component Value Date    Ferritin 101.80 11/21/2024    Folate >20.00 11/21/2024     Lab Results   Component Value Date    IRON 85 11/21/2024    LABIRON 17 (L) 11/21/2024    TRANSFERRIN 335 11/21/2024    TIBC 499 11/21/2024     Lab Results   Component Value Date    LDH  11/21/2024      Comment:      Specimen hemolyzed, results removed. Test credited.   Corrected result. Previous result was 363 U/L on 11/21/2024 at 1510 EST.    FERRITIN 101.80 11/21/2024    [id number] 1,604 (H) 11/21/2024    FOLATE >20.00 11/21/2024     Lab Results   Component Value Date    PSA <0.014 10/11/2024     Labs personally reviewed    Endocrine note personally reviewed    PCP note personally reviewed           Assessment and Plan:  Diagnoses and all orders for this visit:    1. Leukopenia, unspecified type (Primary)  -     CBC & Differential; Future    2. Thrombocytopenia    3. Splenomegaly    4. Fatty liver        Leukocytopenia on recent lab work with absolute neutropenia. ANC not less than 1K. Denies any fevers, chills, or weight loss. No frequent infections. Also, with longstanding chronic thrombocytopenia since at least September of 2020. Likely, the leukopenia and the  thrombocytopenia is secondary to fatty liver and splenomegaly seen on prior CT abdomen in 2023.  He denies any acute GI blood loss, gums bleeding, nose bleeds.     Reassuringly he is not anemic. Smear with normal morphology. Flow cytometry also normal. B12, folate, iron studies normal. Results discussed and reassurance provided.     As ANC >1.0K, plts have been chronically low, and have likely explanation from his splenomegaly/fatty liver, will hold on bone marrow evaluation for now in favor of continued trending of labs. Repeat labs 4-6 months. If downtrend noted, can consider bone marrow at that time.  Plan discussed with patient who is in agreement.      Fatty liver  Splenomegaly  Has been referred to gastroenterology for evaluation for the fatty liver, splenomegaly to rule out any underlying liver cirrhosis.         I spent 30 minutes caring for Chaparro on this date of service. This time includes time spent by me in the following activities:preparing for the visit, reviewing tests, obtaining and/or reviewing a separately obtained history, performing a medically appropriate examination and/or evaluation , counseling and educating the patient/family/caregiver, ordering medications, tests, or procedures, referring and communicating with other health care professionals , documenting information in the medical record, and independently interpreting results and communicating that information with the patient/family/caregiver    Patient Follow Up: 4-6 months    Patient was given instructions and counseling regarding his condition or for health maintenance advice. Please see specific information pulled into the AVS if appropriate.

## 2025-01-20 NOTE — PROGRESS NOTES
Chief Complaint: No chief complaint on file.    Subjective         History of Present Illness  Chaparro Weber is a 69 y.o. male presents to [facility] to be seen for follow-up.    Patient was previously seen by me with last visit on 7/12/2024 for BPH/kidney stones.  At that visit he reported that he was doing better with tamsulosin.  He was advised to have his KUB completed prior to this visit.  He is here for follow-up.    Denies hematuria    He reports that 1-2 months ago he felt like he passed a small stone. He had pain at the tip of his penis and this did not recur.      PSA  10/11/2024 <0.014    XR ABDOMEN KUB     Date of Exam: 1/3/2025 8:45 AM EST     Indication: kidney stone     Comparison: 1/2/2024     Findings:  Patient is status post lumbosacral spinal fusion. There is an 8 to 9 mm stone in the lower left kidney. There is a nearly 8 mm stone in the lower pole of the right kidney. Smaller bilateral nonobstructing kidney stones are suspected as well. No definite   ureteral stones. Phleboliths in the left pelvis are unchanged. There is degenerative disease in both hips. Bowel gas pattern is nonobstructive.     IMPRESSION:  Bilateral nonobstructing kidney stones.           Electronically Signed: Qasim Vivar MD    1/6/2025 12:26 AM EST     Previous 7/12/2024:  Patient was previously seen by me with last visit on 1/3/2024 for BPH/kidney stones.  At that visit he was doing well on tamsulosin.  We also had plan to order his KUB to be done 6 months from now.  He is here for follow-up.     Patient reports he is doing well with tamsulosin. He is not voiding as frequently. He reports he is drinking a lot of water since starting Jardiance which seems helpful as well.      Previous 1/3/2024:  Patient was previously seen by me with last visit on 5/15/2023 for BPH/kidney stones.  He was doing markedly better with 2 tamsulosin daily at that visit and he was advised to follow-up with a KUB  prior.  He is here for follow-up.     He had back surgery in June. He didn't not have any urinary symptoms post op.      He is doing well with his urinary symptoms.      He has stopped drinking tea and soda.      PSA  10/2023 <0.014     PROCEDURE:  XR ABDOMEN KUB     COMPARISON: [facility], CR, XR ABDOMEN KUB, 1/05/2023, 11:59.     INDICATIONS:  history of kidney stones. YEARLY FOLLOW UP. NO COMPLAINTS     FINDINGS:          There is an 8 mm stone projected over the lower pole of the left kidney.  No stones are identified   over the right kidney.  There are postoperative changes of lumbar fusion from L2 through L5.  The   patient has undergone prior cholecystectomy.     IMPRESSION:                 1. 8 mm calcified stone projected over the lower pole the left kidney.            Live Garcia MD         Electronically Signed and Approved By: Live Garcia MD on 1/02/2024 at 12:51                   Previous 5/15/2023:  Patient was previously seen by Dr. Mon on 1/11/2023 for follow-up of kidney stones.  He is to have a KUB next January for follow-up of kidney stones.  He was also seen and treated for BPH with nocturia.  His Flomax was increased to 2 capsules/day and he was advised to follow-up in 3 months to see if this has improved his symptoms. He reports his symptoms have improved dramatically since increasing Flomax. He reports he is having 0-1 episodes of nocturia.      PSA  7/29/2022 less than 0.014  7/19/2021 less than 0.014  9/18/2020 less than 0.01     Previous 1/11/2023:  The patient presents for a follow-up from bilateral ureteroscopy with laser on the right and stone extraction on the left. He had bilateral ureteral stents in place.     The patient reports he is doing well. He had kidney stones approximately 8 to 10 years ago. He drinks a lot of water. He states he plays pickleball. He reports his back has been bothering him. He had a little pain yesterday and this morning. He states he  is feeling good. Both of his stents are out. He was working on his prostate and it was really enlarged. The medicine he was put on did not seem to work. He stopped taking it because of the stones. He has had a lot of prostate issues for a while.       Objective     Past Medical History:   Diagnosis Date    Anxiety     Arthritis     Asthma     - PT DENIED ASTHMA- NO INHALERS    Bradycardia     DEFBIRLLATOR/ PACEMAKER- MEDITRONIC- SEE'S DR. HAMEED DENIED CP/SOB    Chest pain     NO RECENT CP    CHF (congestive heart failure)     ASYMPTOMATIC    Condition not found     Hernia-Unspecified    Coronary artery disease     Deafness     Diabetes     BG RUNS AROUND 250-300 IN AM    HBP (high blood pressure)     Heart disease     High cholesterol     Kidney stones     Lung disease     Night sweats     Ringing in ear     Sleep apnea     Type 2 diabetes mellitus with autonomic neuropathy 07/16/2021       Past Surgical History:   Procedure Laterality Date    APPENDECTOMY      BACK SURGERY  2000,2010    lumbar with hardware    CARDIAC DEFIBRILLATOR PLACEMENT      MEDITRONIC    CHOLECYSTECTOMY      COLONOSCOPY N/A 06/22/2022    Procedure: COLONOSCOPY;  Surgeon: Nicholas Townsend MD;  Location: [facility] END[facility]COPY;  Service: General;  Laterality: N/A;  DIVERTICULOSIS    CYSTOSCOPY W/ STONE MANIPULATION Right 1/5/2023    Procedure: CYSTOSCOPY KIDNEY STONE MANIPULATION/EXTRACTION;  Surgeon: Ann Mon MD;  Location: [facility] MAIN OR;  Service: Urology;  Laterality: Right;    KIDNEY STONE SURGERY      NECK SURGERY  2005    removal of bone spurs    PACEMAKER IMPLANTATION      10 YEARS AGO PLACED- MEDITRONIC    URETEROSCOPY LASER LITHOTRIPSY WITH STENT INSERTION Left 1/5/2023    Procedure: LEFT URETEROSCOPY LASER LITHOTRIPSY WITH STENT INSERTION AND RETROGRADE; RIGHT URETEROSCOPY WITH STENT INSERTION AND RETROGRADE;  Surgeon: Ann Mon MD;  Location: [facility] MAIN OR;  Service: Urology;  Laterality: Left;         Current  Outpatient Medications:     aspirin  MG tablet, Take 1 tablet by mouth Daily. PT STATED DR CHEN OFFICE INSTRUCTED TO STOP 5 DAYS PRIOR TO SURGERY, LAST DOSE PT STATED INSTRUCTED TO TAKE IS 12-30-22, Disp: , Rfl:     B Complex Vitamins (vitamin b complex) capsule capsule, Take  by mouth Daily., Disp: , Rfl:     Biotin 56568 MCG tablet, Take  by mouth., Disp: , Rfl:     celecoxib (CeleBREX) 100 MG capsule, TAKE ONE CAPSULE BY MOUTH TWICE A DAY AS DIRECTED FOR 30 DAYS., Disp: 180 capsule, Rfl: 1    cholecalciferol (VITAMIN D3) 25 MCG (1000 UT) tablet, Take 2 tablets by mouth Daily., Disp: , Rfl:     Continuous Glucose Sensor (FreeStyle Patel 3 Sensor) misc, Use 1 each Every 14 (Fourteen) Days., Disp: 6 each, Rfl: 1    cyclobenzaprine (FLEXERIL) 5 MG tablet, Take 1-2 tablets prn tid for muscle spasm, Disp: 90 tablet, Rfl: 1    Dulaglutide (Trulicity) 4.5 MG/0.5ML solution auto-injector, Inject 4.5 mg under the skin into the appropriate area as directed 1 (One) Time Per Week., Disp: 6 mL, Rfl: 3    escitalopram (LEXAPRO) 20 MG tablet, TAKE ONE TABLET BY MOUTH ONCE DAILY, Disp: 30 tablet, Rfl: 1    fenofibrate (TRICOR) 145 MG tablet, TAKE ONE TABLET BY MOUTH ONCE DAILY, Disp: 90 tablet, Rfl: 1    glucose blood test strip, Use as instructed, Disp: 100 each, Rfl: 2    glucose blood test strip, OneTouch Ultra Test strips, Disp: , Rfl:     IBUPROFEN IB PO, Take  by mouth., Disp: , Rfl:     Insulin Pen Needle (Comfort EZ Pen Needles) 32G X 5 MM misc, USE 1 EACH 5 (FIVE) TIMES A DAY., Disp: 450 each, Rfl: 1    Lyumjev KwikPen 200 UNIT/ML solution pen-injector, Inject 40 Units under the skin into the appropriate area as directed 3 (Three) Times a Day Before Meals., Disp: 72 mL, Rfl: 4    methylcellulose, Laxative, (CITRUCEL) 500 MG tablet tablet, , Disp: , Rfl:     metoprolol succinate XL (TOPROL-XL) 25 MG 24 hr tablet, Take 1 tablet by mouth Daily., Disp: , Rfl:     multivitamin with minerals (PRESERVISION AREDS PO),  Take 1 tablet by mouth Daily., Disp: , Rfl:     NON FORMULARY, Move free joint health, Disp: , Rfl:     Omega-3 Fatty Acids (fish oil) 1200 MG capsule capsule, Take 1 capsule by mouth Daily., Disp: , Rfl:     oxyCODONE-acetaminophen (PERCOCET)  MG per tablet, Take 1 tablet by mouth Every 4 (Four) Hours As Needed., Disp: , Rfl:     pregabalin (LYRICA) 300 MG capsule, Take 1 capsule by mouth Daily., Disp: , Rfl:     psyllium (Metamucil) 0.52 g capsule, , Disp: , Rfl:     QUERCETIN PO, Take 1,000 mg by mouth Daily., Disp: , Rfl:     rosuvastatin (CRESTOR) 10 MG tablet, TAKE 1 TABLET BY MOUTH DAILY., Disp: 90 tablet, Rfl: 1    tamsulosin (FLOMAX) 0.4 MG capsule 24 hr capsule, Take 2 capsules by mouth Daily., Disp: 180 capsule, Rfl: 4    vitamin B-12 (CYANOCOBALAMIN) 1000 MCG tablet, Take 1,200 mcg by mouth Daily., Disp: , Rfl:     vitamin E 100 UNIT capsule, Take 1 capsule by mouth Daily., Disp: , Rfl:     Zinc 50 MG capsule, Take  by mouth., Disp: , Rfl:     Insulin Glargine (BASAGLAR KWIKPEN) 100 UNIT/ML injection pen, Inject 90 Units under the skin into the appropriate area as directed Daily for 180 days., Disp: 81 mL, Rfl: 1    Allergies   Allergen Reactions    Codeine Nausea Only and Nausea And Vomiting        Family History   Problem Relation Age of Onset    Other Mother 60        Renal Cancer    Prostate cancer Father     Colon cancer Father 70    Malig Hyperthermia Neg Hx        Social History     Socioeconomic History    Marital status:     Number of children: 2   Tobacco Use    Smoking status: Former     Current packs/day: 0.00     Average packs/day: 1.5 packs/day for 40.0 years (60.0 ttl pk-yrs)     Types: Cigarettes     Start date: 1965     Quit date:      Years since quittin.0     Passive exposure: Past    Smokeless tobacco: Never    Tobacco comments:     no second hand smoke exposure   Vaping Use    Vaping status: Never Used   Substance and Sexual Activity    Alcohol use: Never     Drug use: Never    Sexual activity: Defer       Vital Signs:   Resp 18      Physical Exam  Vitals reviewed.   Constitutional:       Appearance: Normal appearance.   Neurological:      General: No focal deficit present.      Mental Status: He is alert and oriented to person, place, and time.   Psychiatric:         Mood and Affect: Mood normal.         Behavior: Behavior normal.          Result Review :   The following data was reviewed by: ANGLE Knox on 01/22/2025:  Results for orders placed or performed in visit on 01/22/25   Bladder Scan    Collection Time: 01/22/25  2:14 PM   Result Value Ref Range    Volume 0ml       PSA          10/11/2024    09:17   PSA   PSA <0.014          Procedures        Assessment and Plan    Diagnoses and all orders for this visit:    1. Benign prostatic hyperplasia with nocturia (Primary)  -     Bladder Scan    2. Kidney stone  -     XR Abdomen KUB; Future    Discussed options for removal of the stones or continue to monitor.  At this point he would like to wait another year and then the KUB.  I will go ahead and schedule him for a follow-up with Dr. Mon in 1 year with KUB prior so they can discuss surgical options at that point.    Follow Up   No follow-ups on file.  Patient was given instructions and counseling regarding his condition or for health maintenance advice. Please see specific information pulled into the AVS if appropriate.         This document has been electronically signed by ANGLE Knox  January 22, 2025 14:31 EST

## 2025-01-22 ENCOUNTER — OFFICE VISIT (OUTPATIENT)
Dept: UROLOGY | Age: 70
End: 2025-01-22
Payer: MEDICARE

## 2025-01-22 VITALS — RESPIRATION RATE: 18 BRPM

## 2025-01-22 DIAGNOSIS — N40.0 BENIGN PROSTATIC HYPERPLASIA WITHOUT LOWER URINARY TRACT SYMPTOMS: ICD-10-CM

## 2025-01-22 DIAGNOSIS — N20.0 KIDNEY STONE: ICD-10-CM

## 2025-01-22 DIAGNOSIS — N40.1 BENIGN PROSTATIC HYPERPLASIA WITH NOCTURIA: Primary | ICD-10-CM

## 2025-01-22 DIAGNOSIS — R35.1 BENIGN PROSTATIC HYPERPLASIA WITH NOCTURIA: Primary | ICD-10-CM

## 2025-01-22 LAB — SPECIMEN VOL 24H UR: NORMAL L

## 2025-01-22 RX ORDER — TAMSULOSIN HYDROCHLORIDE 0.4 MG/1
0.8 CAPSULE ORAL DAILY
Qty: 180 CAPSULE | Refills: 4 | Status: SHIPPED | OUTPATIENT
Start: 2025-01-22

## 2025-02-12 DIAGNOSIS — E11.65 UNCONTROLLED TYPE 2 DIABETES MELLITUS WITH HYPERGLYCEMIA: ICD-10-CM

## 2025-02-13 RX ORDER — ACYCLOVIR 800 MG/1
1 TABLET ORAL
Qty: 6 EACH | Refills: 2 | Status: SHIPPED | OUTPATIENT
Start: 2025-02-13

## 2025-02-24 RX ORDER — FENOFIBRATE 145 MG/1
TABLET, COATED ORAL
Qty: 90 TABLET | Refills: 2 | Status: SHIPPED | OUTPATIENT
Start: 2025-02-24

## 2025-02-24 RX ORDER — CELECOXIB 100 MG/1
CAPSULE ORAL
Qty: 180 CAPSULE | Refills: 2 | Status: SHIPPED | OUTPATIENT
Start: 2025-02-24

## 2025-02-27 RX ORDER — ESCITALOPRAM OXALATE 20 MG/1
TABLET ORAL
Qty: 30 TABLET | Refills: 2 | Status: SHIPPED | OUTPATIENT
Start: 2025-02-27

## 2025-03-11 NOTE — PROGRESS NOTES
Chief Complaint        new patient (Fatty liver, feels tired and weak and no strength in legs and feels wore out. )    Patient or patient representative verbalized consent for the use of Ambient Listening during the visit with  ANGLE Ramachandran for chart documentation. 3/13/2025  13:49 EDT    History of Present Illness      Chaparro Weber is a 69 y.o. male who presents to [facility] as a new patient     History of Present Illness  The patient is a 69-year-old male presenting to the office today for evaluation of hepatic steatosis, thrombocytopenia, diabetes mellitus, fatigue, and weight management.    He was referred to our office by Dr. Kulkarni due to a diagnosis of thrombocytopenia.  An ultrasound that displayed a possible hemangioma and recommended MRI.  He has not undergone an MRI scan as he is unable to undergo MRI because of his defibrillator.    He reports difficulty in losing weight, attributing it to back injuries that limit his physical activity. He has undergone extensive back surgery, lasting approximately 10 hours, and experiences significant pain during ambulation. He is a former pickleball player, having played five times a week until a year and a half ago. He also reports shortness of breath, necessitating rest after climbing five steps. He is aware of the need for weight loss and is committed to achieving it.    He has a history of diabetes and is currently on Trulicity. He is scheduled to see Suzanna Young on Monday. He avoids fruits in his diet due to their high sugar content.    He reports fatigue, balance issues, and leg weakness. He maintains a regimen of vitamin supplements and has not contracted COVID-19.    He has a history of kidney stones and is under the care of a urologist. He underwent a colonoscopy in 2022 and is due for another in 06/2025. He has never had an upper endoscopy and does not experience heartburn or reflux. He occasionally coughs up  mucus, which he believes is cardiac in origin, and sometimes needs to clear his throat.    SOCIAL HISTORY  He is retired.    FAMILY HISTORY  His father had colon cancer that metastasized to the liver in his 70s.    MEDICATIONS  Trulicity    Labs: 01/03/2025    US abdomen limited 12/12/2024  1. Liver is mildly enlarged and there is abnormal appearance of the liver with increased echogenicity diffusely suggesting diffuse hepatocellular disease, hepatic steatosis  2. Focal increased echogenic focus within the liver is indeterminate and could represent a hemangioma. Dedicated imaging of the liver utilizing a MRI with contrast recommended to further evaluate  3. Probable nonobstructing right renal calculus  4. Splenomegaly    XR Abdomen KUB 01/03/2025  Bilateral nonobstructing kidney stones.    Colonoscopy 06/22/2022 by Dr. Townsend    Colonoscopy: Review of the patient's most recent colonoscopy performed by Dr. Townsend on 4/24/2019  Results       Result Review :   The following data was reviewed by: ANGLE Ramachandran on 03/13/2025     CMP          10/11/2024    09:17 11/22/2024    11:16   CMP   Glucose 123  119    BUN 20  23    Creatinine 1.18  1.07    EGFR 67.6  76.1    Sodium 142  141    Potassium 4.7  4.5    Chloride 104  102    Calcium 9.5  9.0    Total Protein 7.1  7.1    Albumin 4.4  4.4    Globulin 2.7  2.7    Total Bilirubin 0.4  0.5    Alkaline Phosphatase 46  49    AST (SGOT) 39  43    ALT (SGPT) 28  31    Albumin/Globulin Ratio 1.6  1.6    BUN/Creatinine Ratio 16.9  21.5    Anion Gap 7.2  11.1      CBC          10/11/2024    09:17 11/13/2024    09:23 11/21/2024    13:53   CBC   WBC 3.31  2.59  2.79    RBC 5.21  4.94  5.01    Hemoglobin 14.7  14.4  14.4    Hematocrit 46.9  43.9  45.8    MCV 90.0  88.9  91.4    MCH 28.2  29.1  28.7    MCHC 31.3  32.8  31.4    RDW 13.6  13.6  13.9    Platelets 88  66  76        Iron Profile   Iron   Date Value Ref Range Status   11/21/2024 85 59 - 158 mcg/dL Final     TIBC   Date  Value Ref Range Status   11/21/2024 499 298 - 536 mcg/dL Final     Iron Saturation (TSAT)   Date Value Ref Range Status   11/21/2024 17 (L) 20 - 50 % Final     Transferrin   Date Value Ref Range Status   11/21/2024 335 200 - 360 mg/dL Final     Ferritin   Ferritin   Date Value Ref Range Status   11/21/2024 101.80 30.00 - 400.00 ng/mL Final     Liver Workup   Ferritin   Date Value Ref Range Status   11/21/2024 101.80 30.00 - 400.00 ng/mL Final     Iron   Date Value Ref Range Status   11/21/2024 85 59 - 158 mcg/dL Final     TIBC   Date Value Ref Range Status   11/21/2024 499 298 - 536 mcg/dL Final     Iron Saturation (TSAT)   Date Value Ref Range Status   11/21/2024 17 (L) 20 - 50 % Final     Transferrin   Date Value Ref Range Status   11/21/2024 335 200 - 360 mg/dL Final     Protime   Date Value Ref Range Status   10/04/2023 12.2 9.4 - 12.2 Second Final     Comment:     Patients taking the antibiotic Cubicin (daptomycin) may have falsely prolonged PT/INR results.   06/28/2023 14.2 (H) 10.3 - 13.3 s Final     Comment:     (note)  Anticoagulants may alter the results of Laboratory   Coagulation assays. New-generation anticoagulants such as   direct Thrombin inhibitors (Dabigatran/Pradaxa, Argatroban,   Bivalrudin) and direct/indirect factor Xa inhibitors   (Rivaroxaban/Xarelto,Apixaban/Eliquis, Danaparoid/Orgaran,   Fondaparinux/Arixtra) have been shown to affect the results   of Laboratory Coagulation assays, creating falsely elevated   or decreased values. Correlation with medication history is   advised.     INR   Date Value Ref Range Status   10/04/2023 1.08 INR Final     Comment:     Recommended INR range for Oral Anticoagulant Therapy :     STANDARD: 2.0 - 3.0   HIGH: 3.0 - 4.5    NOTE: Recommended range will be different for patients with mechanical         implants.            Results  Laboratory Studies  Blood platelets were low.    Imaging  Imaging done in December showed liver slightly enlarged, possibly  related to hepatic steatosis. An indeterminate area within the liver could be a hemangioma. Splenomegaly was also noted.        Past Medical History       Past Medical History:   Diagnosis Date    Anxiety     Arthritis     Asthma     - PT DENIED ASTHMA- NO INHALERS    Bradycardia     DEFBIRLLATOR/ PACEMAKER- MEDITRONIC- SEE'S DR. HAMEED DENIED CP/SOB    Chest pain     NO RECENT CP    CHF (congestive heart failure)     ASYMPTOMATIC    Colon polyp     Condition not found     Hernia-Unspecified    Coronary artery disease     Deafness     Diabetes     BG RUNS AROUND 250-300 IN AM    Fatty liver     HBP (high blood pressure)     Heart disease     High cholesterol     Kidney stones     Lung disease     Night sweats     Ringing in ear     Sleep apnea     Type 2 diabetes mellitus with autonomic neuropathy 07/16/2021       Past Surgical History:   Procedure Laterality Date    APPENDECTOMY      BACK SURGERY  2000,2010    lumbar with hardware    CARDIAC DEFIBRILLATOR PLACEMENT      MEDITRONIC    CHOLECYSTECTOMY      COLONOSCOPY N/A 06/22/2022    Procedure: COLONOSCOPY;  Surgeon: Nicholas Townsend MD;  Location: [facility] ENDOSCOPY;  Service: General;  Laterality: N/A;  DIVERTICULOSIS    CYSTOSCOPY W/ STONE MANIPULATION Right 1/5/2023    Procedure: CYSTOSCOPY KIDNEY STONE MANIPULATION/EXTRACTION;  Surgeon: Ann Mon MD;  Location: [facility] MAIN OR;  Service: Urology;  Laterality: Right;    KIDNEY STONE SURGERY      NECK SURGERY  2005    removal of bone spurs    PACEMAKER IMPLANTATION      10 YEARS AGO PLACED- [employer]    URETEROSCOPY LASER LITHOTRIPSY WITH STENT INSERTION Left 1/5/2023    Procedure: LEFT URETEROSCOPY LASER LITHOTRIPSY WITH STENT INSERTION AND RETROGRADE; RIGHT URETEROSCOPY WITH STENT INSERTION AND RETROGRADE;  Surgeon: Ann Mon MD;  Location: [facility] MAIN OR;  Service: Urology;  Laterality: Left;         Current Outpatient Medications:     aspirin  MG tablet, Take 1 tablet by mouth Daily. PT  STATED DR CHEN OFFICE INSTRUCTED TO STOP 5 DAYS PRIOR TO SURGERY, LAST DOSE PT STATED INSTRUCTED TO TAKE IS 12-30-22, Disp: , Rfl:     B Complex Vitamins (vitamin b complex) capsule capsule, Take  by mouth Daily., Disp: , Rfl:     Biotin 30247 MCG tablet, Take  by mouth., Disp: , Rfl:     celecoxib (CeleBREX) 100 MG capsule, TAKE ONE CAPSULE BY MOUTH TWICE A DAY AS DIRECTED FOR 30 DAYS., Disp: 180 capsule, Rfl: 2    cholecalciferol (VITAMIN D3) 25 MCG (1000 UT) tablet, Take 2 tablets by mouth Daily., Disp: , Rfl:     Continuous Glucose Sensor (FreeStyle Patel 3 Sensor) misc, USE 1 EACH EVERY 14 (FOURTEEN) DAYS., Disp: 6 each, Rfl: 2    cyclobenzaprine (FLEXERIL) 5 MG tablet, Take 1-2 tablets prn tid for muscle spasm, Disp: 90 tablet, Rfl: 1    Dulaglutide (Trulicity) 4.5 MG/0.5ML solution auto-injector, Inject 4.5 mg under the skin into the appropriate area as directed 1 (One) Time Per Week., Disp: 6 mL, Rfl: 3    escitalopram (LEXAPRO) 20 MG tablet, TAKE ONE TABLET BY MOUTH ONCE DAILY, Disp: 30 tablet, Rfl: 2    fenofibrate (TRICOR) 145 MG tablet, TAKE ONE TABLET BY MOUTH ONCE DAILY, Disp: 90 tablet, Rfl: 2    glucose blood test strip, Use as instructed, Disp: 100 each, Rfl: 2    glucose blood test strip, OneTouch Ultra Test strips, Disp: , Rfl:     IBUPROFEN IB PO, Take  by mouth., Disp: , Rfl:     Insulin Pen Needle (Comfort EZ Pen Needles) 32G X 5 MM misc, USE 1 EACH 5 (FIVE) TIMES A DAY., Disp: 450 each, Rfl: 1    Lyumjev KwikPen 200 UNIT/ML solution pen-injector, Inject 40 Units under the skin into the appropriate area as directed 3 (Three) Times a Day Before Meals., Disp: 72 mL, Rfl: 4    methylcellulose, Laxative, (CITRUCEL) 500 MG tablet tablet, , Disp: , Rfl:     metoprolol succinate XL (TOPROL-XL) 25 MG 24 hr tablet, Take 1 tablet by mouth Daily., Disp: , Rfl:     multivitamin with minerals (PRESERVISION AREDS PO), Take 1 tablet by mouth Daily., Disp: , Rfl:     NON FORMULARY, Move free joint health,  "Disp: , Rfl:     Omega-3 Fatty Acids (fish oil) 1200 MG capsule capsule, Take 1 capsule by mouth Daily., Disp: , Rfl:     oxyCODONE-acetaminophen (PERCOCET)  MG per tablet, Take 1 tablet by mouth Every 4 (Four) Hours As Needed., Disp: , Rfl:     pregabalin (LYRICA) 300 MG capsule, Take 1 capsule by mouth Daily., Disp: , Rfl:     psyllium (Metamucil) 0.52 g capsule, , Disp: , Rfl:     QUERCETIN PO, Take 1,000 mg by mouth Daily., Disp: , Rfl:     rosuvastatin (CRESTOR) 10 MG tablet, TAKE 1 TABLET BY MOUTH DAILY., Disp: 90 tablet, Rfl: 1    tamsulosin (FLOMAX) 0.4 MG capsule 24 hr capsule, Take 2 capsules by mouth Daily., Disp: 180 capsule, Rfl: 4    vitamin B-12 (CYANOCOBALAMIN) 1000 MCG tablet, Take 1,200 mcg by mouth Daily., Disp: , Rfl:     vitamin E 100 UNIT capsule, Take 1 capsule by mouth Daily., Disp: , Rfl:     Zinc 50 MG capsule, Take  by mouth., Disp: , Rfl:     Insulin Glargine (BASAGLAR KWIKPEN) 100 UNIT/ML injection pen, Inject 90 Units under the skin into the appropriate area as directed Daily for 180 days., Disp: 81 mL, Rfl: 1    Sod Picosulfate-Mag Ox-Cit Acd (Clenpiq) 10-3.5-12 MG-GM -GM/175ML solution, Take 175 mL by mouth 1 (One) Time for 1 dose., Disp: 350 mL, Rfl: 0     Allergies   Allergen Reactions    Codeine Nausea Only and Nausea And Vomiting       Family History   Problem Relation Age of Onset    Other Mother 60        Renal Cancer    Prostate cancer Father     Colon cancer Father 70    Malig Hyperthermia Neg Hx         Social History     Social History Narrative    , 2 adult daughters, 6 grandchildren, lives at home with wife 12.8.22       Objective       Objective     Vital Signs:   /67 (BP Location: Left arm, Patient Position: Sitting, Cuff Size: Adult)   Pulse 90   Ht 188 cm (74.02\")   Wt 121 kg (266 lb)   SpO2 93%   BMI 34.13 kg/m²     Body mass index is 34.13 kg/m².    Physical Exam  Constitutional:       Appearance: Normal appearance.   Pulmonary:      Effort: " Pulmonary effort is normal.   Neurological:      General: No focal deficit present.      Mental Status: He is alert and oriented to person, place, and time.   Psychiatric:         Mood and Affect: Mood normal.         Behavior: Behavior normal.         Physical Exam                 Assessment & Plan          Assessment and Plan    Diagnoses and all orders for this visit:    1. Family history of colon cancer (Primary)  Comments:  father with crc at age 72  Orders:  -     Hepatitis Panel, Acute; Future  -     CBC & Differential; Future  -     Comprehensive Metabolic Panel; Future  -     Ferritin; Future  -     Iron Profile; Future  -     Protime-INR; Future  -     Alpha - 1 - Antitrypsin Phenotype; Future  -     FARRAH; Future  -     Anti-Smooth Muscle Antibody Titer; Future  -     Ceruloplasmin; Future  -     Mitochondrial Antibodies, M2; Future  -     TSH; Future  -     T4, Free; Future  -     EBV Antibody Profile; Future  -     CMV IgG IgM; Future  -     Vitamin B12; Future  -     Folate; Future  -     Testosterone; Future  -     Vitamin D 25 Hydroxy; Future  -     Ammonia; Future  -     Case Request; Standing  -     Follow Anesthesia Guidelines / Protocol; Future  -     Verify NPO; Standing  -     Verify Bowel Prep Was Successful; Standing  -     Give Tap Water Enema If Bowel Prep Insufficient; Standing  -     Follow Anesthesia Guidelines / Protocol; Standing  -     Case Request  -     AFP Tumor Marker    2. Hepatic steatosis  -     Hepatitis Panel, Acute; Future  -     CBC & Differential; Future  -     Comprehensive Metabolic Panel; Future  -     Ferritin; Future  -     Iron Profile; Future  -     Protime-INR; Future  -     Alpha - 1 - Antitrypsin Phenotype; Future  -     FARRAH; Future  -     Anti-Smooth Muscle Antibody Titer; Future  -     Ceruloplasmin; Future  -     Mitochondrial Antibodies, M2; Future  -     TSH; Future  -     T4, Free; Future  -     EBV Antibody Profile; Future  -     CMV IgG IgM; Future  -      Vitamin B12; Future  -     Folate; Future  -     Testosterone; Future  -     Vitamin D 25 Hydroxy; Future  -     Ammonia; Future  -     CT Abdomen Pelvis With Contrast; Future  -     Case Request; Standing  -     Follow Anesthesia Guidelines / Protocol; Future  -     Verify NPO; Standing  -     Verify Bowel Prep Was Successful; Standing  -     Give Tap Water Enema If Bowel Prep Insufficient; Standing  -     Follow Anesthesia Guidelines / Protocol; Standing  -     Case Request  -     AFP Tumor Marker    3. Splenomegaly  -     Hepatitis Panel, Acute; Future  -     CBC & Differential; Future  -     Comprehensive Metabolic Panel; Future  -     Ferritin; Future  -     Iron Profile; Future  -     Protime-INR; Future  -     Alpha - 1 - Antitrypsin Phenotype; Future  -     FARRAH; Future  -     Anti-Smooth Muscle Antibody Titer; Future  -     Ceruloplasmin; Future  -     Mitochondrial Antibodies, M2; Future  -     TSH; Future  -     T4, Free; Future  -     EBV Antibody Profile; Future  -     CMV IgG IgM; Future  -     Vitamin B12; Future  -     Folate; Future  -     Testosterone; Future  -     Vitamin D 25 Hydroxy; Future  -     Ammonia; Future  -     CT Abdomen Pelvis With Contrast; Future  -     Case Request; Standing  -     Follow Anesthesia Guidelines / Protocol; Future  -     Verify NPO; Standing  -     Verify Bowel Prep Was Successful; Standing  -     Give Tap Water Enema If Bowel Prep Insufficient; Standing  -     Follow Anesthesia Guidelines / Protocol; Standing  -     Case Request  -     AFP Tumor Marker    4. Thrombocytopenia  -     Hepatitis Panel, Acute; Future  -     CBC & Differential; Future  -     Comprehensive Metabolic Panel; Future  -     Ferritin; Future  -     Iron Profile; Future  -     Protime-INR; Future  -     Alpha - 1 - Antitrypsin Phenotype; Future  -     FARRAH; Future  -     Anti-Smooth Muscle Antibody Titer; Future  -     Ceruloplasmin; Future  -     Mitochondrial Antibodies, M2; Future  -     TSH;  Future  -     T4, Free; Future  -     EBV Antibody Profile; Future  -     CMV IgG IgM; Future  -     Vitamin B12; Future  -     Folate; Future  -     Testosterone; Future  -     Vitamin D 25 Hydroxy; Future  -     Ammonia; Future  -     CT Abdomen Pelvis With Contrast; Future  -     Case Request; Standing  -     Follow Anesthesia Guidelines / Protocol; Future  -     Verify NPO; Standing  -     Verify Bowel Prep Was Successful; Standing  -     Give Tap Water Enema If Bowel Prep Insufficient; Standing  -     Follow Anesthesia Guidelines / Protocol; Standing  -     Case Request  -     AFP Tumor Marker    5. Abnormal results of liver function studies  -     Hepatitis Panel, Acute; Future  -     Case Request; Standing  -     Follow Anesthesia Guidelines / Protocol; Future  -     Verify NPO; Standing  -     Verify Bowel Prep Was Successful; Standing  -     Give Tap Water Enema If Bowel Prep Insufficient; Standing  -     Follow Anesthesia Guidelines / Protocol; Standing  -     Case Request  -     AFP Tumor Marker    6. Abnormal findings on diagnostic imaging of other specified body structures  -     TSH; Future  -     T4, Free; Future  -     CT Abdomen Pelvis With Contrast; Future  -     Case Request; Standing  -     Follow Anesthesia Guidelines / Protocol; Future  -     Verify NPO; Standing  -     Verify Bowel Prep Was Successful; Standing  -     Give Tap Water Enema If Bowel Prep Insufficient; Standing  -     Follow Anesthesia Guidelines / Protocol; Standing  -     Case Request  -     AFP Tumor Marker    7. Vitamin D deficiency, unspecified  -     Vitamin D 25 Hydroxy; Future  -     Case Request; Standing  -     Follow Anesthesia Guidelines / Protocol; Future  -     Verify NPO; Standing  -     Verify Bowel Prep Was Successful; Standing  -     Give Tap Water Enema If Bowel Prep Insufficient; Standing  -     Follow Anesthesia Guidelines / Protocol; Standing  -     Case Request  -     AFP Tumor Marker    8. Liver lesion  -      CT Abdomen Pelvis With Contrast; Future  -     Case Request; Standing  -     Follow Anesthesia Guidelines / Protocol; Future  -     Verify NPO; Standing  -     Verify Bowel Prep Was Successful; Standing  -     Give Tap Water Enema If Bowel Prep Insufficient; Standing  -     Follow Anesthesia Guidelines / Protocol; Standing  -     Case Request  -     AFP Tumor Marker    9. Throat clearing  -     Case Request; Standing  -     Follow Anesthesia Guidelines / Protocol; Future  -     Verify NPO; Standing  -     Verify Bowel Prep Was Successful; Standing  -     Give Tap Water Enema If Bowel Prep Insufficient; Standing  -     Follow Anesthesia Guidelines / Protocol; Standing  -     Case Request  -     AFP Tumor Marker    10. Class 1 obesity with body mass index (BMI) of 34.0 to 34.9 in adult, unspecified obesity type, unspecified whether serious comorbidity present  -     Case Request; Standing  -     Follow Anesthesia Guidelines / Protocol; Future  -     Verify NPO; Standing  -     Verify Bowel Prep Was Successful; Standing  -     Give Tap Water Enema If Bowel Prep Insufficient; Standing  -     Follow Anesthesia Guidelines / Protocol; Standing  -     Case Request  -     AFP Tumor Marker    11. Encounter for observation for other suspected diseases and conditions ruled out  -     Case Request; Standing  -     Case Request  -     AFP Tumor Marker    Other orders  -     Sod Picosulfate-Mag Ox-Cit Acd (Clenpiq) 10-3.5-12 MG-GM -GM/175ML solution; Take 175 mL by mouth 1 (One) Time for 1 dose.  Dispense: 350 mL; Refill: 0      Assessment & Plan  1. Hepatic steatosis.  Imaging from December showed an enlarged liver, likely due to hepatic steatosis, and an indeterminate area that could be a hemangioma. The spleen was also slightly enlarged, which may contribute to decreased platelet count. Weight loss is crucial to reversing hepatic steatosis and preventing progression to fibrosis or cirrhosis. He is advised to lose 20 to 25  pounds and discuss weight loss medications like Mounjaro with Dr. Suzanna Young. A CT scan will be ordered to further evaluate the liver lesion. Lab work will be conducted to assess liver enzymes, thyroid function, CBC, vitamin levels, and ammonia levels.    2. Thrombocytopenia.  The decreased platelet count may be related to splenomegaly. Lab work will be conducted to assess liver enzymes, thyroid function, CBC, vitamin levels, and ammonia levels.    3. Diabetes Mellitus.  He is currently on Trulicity. He is advised to discuss with Dr. Suzanna Young the possibility of using medications like Mounjaro, which can aid in both diabetes management and weight loss.    4. Fatigue.  He reports significant fatigue, which may be related to his back issues or other underlying conditions. Lab work will be conducted to assess for any infections, thyroid dysfunction, or vitamin deficiencies that could contribute to fatigue.    5. Health Maintenance.  An EGD and colonoscopy will be scheduled with Dr. Tapia in June 2025. The risks associated with these procedures, including sedation, bleeding, perforation, and infection, have been discussed.    PROCEDURE  The patient underwent extensive back surgery, lasting approximately 10 hours.          Follow Up       Follow Up   Return in about 6 months (around 9/13/2025).  Patient was given instructions and counseling regarding his condition or for health maintenance advice. Please see specific information pulled into the AVS if appropriate.

## 2025-03-11 NOTE — H&P (VIEW-ONLY)
Chief Complaint        new patient (Fatty liver, feels tired and weak and no strength in legs and feels wore out. )    Patient or patient representative verbalized consent for the use of Ambient Listening during the visit with  ANGLE Ramachandran for chart documentation. 3/13/2025  13:49 EDT    History of Present Illness      Chaparro Weber is a 69 y.o. male who presents to [facility] as a new patient     History of Present Illness  The patient is a 69-year-old male presenting to the office today for evaluation of hepatic steatosis, thrombocytopenia, diabetes mellitus, fatigue, and weight management.    He was referred to our office by Dr. Kulkarni due to a diagnosis of thrombocytopenia.  An ultrasound that displayed a possible hemangioma and recommended MRI.  He has not undergone an MRI scan as he is unable to undergo MRI because of his defibrillator.    He reports difficulty in losing weight, attributing it to back injuries that limit his physical activity. He has undergone extensive back surgery, lasting approximately 10 hours, and experiences significant pain during ambulation. He is a former pickleball player, having played five times a week until a year and a half ago. He also reports shortness of breath, necessitating rest after climbing five steps. He is aware of the need for weight loss and is committed to achieving it.    He has a history of diabetes and is currently on Trulicity. He is scheduled to see Suzanna Young on Monday. He avoids fruits in his diet due to their high sugar content.    He reports fatigue, balance issues, and leg weakness. He maintains a regimen of vitamin supplements and has not contracted COVID-19.    He has a history of kidney stones and is under the care of a urologist. He underwent a colonoscopy in 2022 and is due for another in 06/2025. He has never had an upper endoscopy and does not experience heartburn or reflux. He occasionally coughs up  mucus, which he believes is cardiac in origin, and sometimes needs to clear his throat.    SOCIAL HISTORY  He is retired.    FAMILY HISTORY  His father had colon cancer that metastasized to the liver in his 70s.    MEDICATIONS  Trulicity    Labs: 01/03/2025    US abdomen limited 12/12/2024  1. Liver is mildly enlarged and there is abnormal appearance of the liver with increased echogenicity diffusely suggesting diffuse hepatocellular disease, hepatic steatosis  2. Focal increased echogenic focus within the liver is indeterminate and could represent a hemangioma. Dedicated imaging of the liver utilizing a MRI with contrast recommended to further evaluate  3. Probable nonobstructing right renal calculus  4. Splenomegaly    XR Abdomen KUB 01/03/2025  Bilateral nonobstructing kidney stones.    Colonoscopy 06/22/2022 by Dr. Townsend    Colonoscopy: Review of the patient's most recent colonoscopy performed by Dr. Townsend on 4/24/2019  Results       Result Review :   The following data was reviewed by: ANGLE Ramachandran on 03/13/2025     CMP          10/11/2024    09:17 11/22/2024    11:16   CMP   Glucose 123  119    BUN 20  23    Creatinine 1.18  1.07    EGFR 67.6  76.1    Sodium 142  141    Potassium 4.7  4.5    Chloride 104  102    Calcium 9.5  9.0    Total Protein 7.1  7.1    Albumin 4.4  4.4    Globulin 2.7  2.7    Total Bilirubin 0.4  0.5    Alkaline Phosphatase 46  49    AST (SGOT) 39  43    ALT (SGPT) 28  31    Albumin/Globulin Ratio 1.6  1.6    BUN/Creatinine Ratio 16.9  21.5    Anion Gap 7.2  11.1      CBC          10/11/2024    09:17 11/13/2024    09:23 11/21/2024    13:53   CBC   WBC 3.31  2.59  2.79    RBC 5.21  4.94  5.01    Hemoglobin 14.7  14.4  14.4    Hematocrit 46.9  43.9  45.8    MCV 90.0  88.9  91.4    MCH 28.2  29.1  28.7    MCHC 31.3  32.8  31.4    RDW 13.6  13.6  13.9    Platelets 88  66  76        Iron Profile   Iron   Date Value Ref Range Status   11/21/2024 85 59 - 158 mcg/dL Final     TIBC   Date  Value Ref Range Status   11/21/2024 499 298 - 536 mcg/dL Final     Iron Saturation (TSAT)   Date Value Ref Range Status   11/21/2024 17 (L) 20 - 50 % Final     Transferrin   Date Value Ref Range Status   11/21/2024 335 200 - 360 mg/dL Final     Ferritin   Ferritin   Date Value Ref Range Status   11/21/2024 101.80 30.00 - 400.00 ng/mL Final     Liver Workup   Ferritin   Date Value Ref Range Status   11/21/2024 101.80 30.00 - 400.00 ng/mL Final     Iron   Date Value Ref Range Status   11/21/2024 85 59 - 158 mcg/dL Final     TIBC   Date Value Ref Range Status   11/21/2024 499 298 - 536 mcg/dL Final     Iron Saturation (TSAT)   Date Value Ref Range Status   11/21/2024 17 (L) 20 - 50 % Final     Transferrin   Date Value Ref Range Status   11/21/2024 335 200 - 360 mg/dL Final     Protime   Date Value Ref Range Status   10/04/2023 12.2 9.4 - 12.2 Second Final     Comment:     Patients taking the antibiotic Cubicin (daptomycin) may have falsely prolonged PT/INR results.   06/28/2023 14.2 (H) 10.3 - 13.3 s Final     Comment:     (note)  Anticoagulants may alter the results of Laboratory   Coagulation assays. New-generation anticoagulants such as   direct Thrombin inhibitors (Dabigatran/Pradaxa, Argatroban,   Bivalrudin) and direct/indirect factor Xa inhibitors   (Rivaroxaban/Xarelto,Apixaban/Eliquis, Danaparoid/Orgaran,   Fondaparinux/Arixtra) have been shown to affect the results   of Laboratory Coagulation assays, creating falsely elevated   or decreased values. Correlation with medication history is   advised.     INR   Date Value Ref Range Status   10/04/2023 1.08 INR Final     Comment:     Recommended INR range for Oral Anticoagulant Therapy :     STANDARD: 2.0 - 3.0   HIGH: 3.0 - 4.5    NOTE: Recommended range will be different for patients with mechanical         implants.            Results  Laboratory Studies  Blood platelets were low.    Imaging  Imaging done in December showed liver slightly enlarged, possibly  related to hepatic steatosis. An indeterminate area within the liver could be a hemangioma. Splenomegaly was also noted.        Past Medical History       Past Medical History:   Diagnosis Date    Anxiety     Arthritis     Asthma     - PT DENIED ASTHMA- NO INHALERS    Bradycardia     DEFBIRLLATOR/ PACEMAKER- MEDITRONIC- SEE'S DR. HAMEED DENIED CP/SOB    Chest pain     NO RECENT CP    CHF (congestive heart failure)     ASYMPTOMATIC    Colon polyp     Condition not found     Hernia-Unspecified    Coronary artery disease     Deafness     Diabetes     BG RUNS AROUND 250-300 IN AM    Fatty liver     HBP (high blood pressure)     Heart disease     High cholesterol     Kidney stones     Lung disease     Night sweats     Ringing in ear     Sleep apnea     Type 2 diabetes mellitus with autonomic neuropathy 07/16/2021       Past Surgical History:   Procedure Laterality Date    APPENDECTOMY      BACK SURGERY  2000,2010    lumbar with hardware    CARDIAC DEFIBRILLATOR PLACEMENT      MEDITRONIC    CHOLECYSTECTOMY      COLONOSCOPY N/A 06/22/2022    Procedure: COLONOSCOPY;  Surgeon: Nicholas Townsend MD;  Location: [facility]COPY;  Service: General;  Laterality: N/A;  DIVERTICULOSIS    CYSTOSCOPY W/ STONE MANIPULATION Right 1/5/2023    Procedure: CYSTOSCOPY KIDNEY STONE MANIPULATION/EXTRACTION;  Surgeon: Ann Mon MD;  Location: [facility] MAIN OR;  Service: Urology;  Laterality: Right;    KIDNEY STONE SURGERY      NECK SURGERY  2005    removal of bone spurs    PACEMAKER IMPLANTATION      10 YEARS AGO PLACED- RevoLazeTRONIC    URETEROSCOPY LASER LITHOTRIPSY WITH STENT INSERTION Left 1/5/2023    Procedure: LEFT URETEROSCOPY LASER LITHOTRIPSY WITH STENT INSERTION AND RETROGRADE; RIGHT URETEROSCOPY WITH STENT INSERTION AND RETROGRADE;  Surgeon: Ann Mon MD;  Location: [facility] MAIN OR;  Service: Urology;  Laterality: Left;         Current Outpatient Medications:     aspirin  MG tablet, Take 1 tablet by mouth Daily. PT  STATED DR CHEN OFFICE INSTRUCTED TO STOP 5 DAYS PRIOR TO SURGERY, LAST DOSE PT STATED INSTRUCTED TO TAKE IS 12-30-22, Disp: , Rfl:     B Complex Vitamins (vitamin b complex) capsule capsule, Take  by mouth Daily., Disp: , Rfl:     Biotin 47923 MCG tablet, Take  by mouth., Disp: , Rfl:     celecoxib (CeleBREX) 100 MG capsule, TAKE ONE CAPSULE BY MOUTH TWICE A DAY AS DIRECTED FOR 30 DAYS., Disp: 180 capsule, Rfl: 2    cholecalciferol (VITAMIN D3) 25 MCG (1000 UT) tablet, Take 2 tablets by mouth Daily., Disp: , Rfl:     Continuous Glucose Sensor (FreeStyle Patel 3 Sensor) misc, USE 1 EACH EVERY 14 (FOURTEEN) DAYS., Disp: 6 each, Rfl: 2    cyclobenzaprine (FLEXERIL) 5 MG tablet, Take 1-2 tablets prn tid for muscle spasm, Disp: 90 tablet, Rfl: 1    Dulaglutide (Trulicity) 4.5 MG/0.5ML solution auto-injector, Inject 4.5 mg under the skin into the appropriate area as directed 1 (One) Time Per Week., Disp: 6 mL, Rfl: 3    escitalopram (LEXAPRO) 20 MG tablet, TAKE ONE TABLET BY MOUTH ONCE DAILY, Disp: 30 tablet, Rfl: 2    fenofibrate (TRICOR) 145 MG tablet, TAKE ONE TABLET BY MOUTH ONCE DAILY, Disp: 90 tablet, Rfl: 2    glucose blood test strip, Use as instructed, Disp: 100 each, Rfl: 2    glucose blood test strip, OneTouch Ultra Test strips, Disp: , Rfl:     IBUPROFEN IB PO, Take  by mouth., Disp: , Rfl:     Insulin Pen Needle (Comfort EZ Pen Needles) 32G X 5 MM misc, USE 1 EACH 5 (FIVE) TIMES A DAY., Disp: 450 each, Rfl: 1    Lyumjev KwikPen 200 UNIT/ML solution pen-injector, Inject 40 Units under the skin into the appropriate area as directed 3 (Three) Times a Day Before Meals., Disp: 72 mL, Rfl: 4    methylcellulose, Laxative, (CITRUCEL) 500 MG tablet tablet, , Disp: , Rfl:     metoprolol succinate XL (TOPROL-XL) 25 MG 24 hr tablet, Take 1 tablet by mouth Daily., Disp: , Rfl:     multivitamin with minerals (PRESERVISION AREDS PO), Take 1 tablet by mouth Daily., Disp: , Rfl:     NON FORMULARY, Move free joint health,  "Disp: , Rfl:     Omega-3 Fatty Acids (fish oil) 1200 MG capsule capsule, Take 1 capsule by mouth Daily., Disp: , Rfl:     oxyCODONE-acetaminophen (PERCOCET)  MG per tablet, Take 1 tablet by mouth Every 4 (Four) Hours As Needed., Disp: , Rfl:     pregabalin (LYRICA) 300 MG capsule, Take 1 capsule by mouth Daily., Disp: , Rfl:     psyllium (Metamucil) 0.52 g capsule, , Disp: , Rfl:     QUERCETIN PO, Take 1,000 mg by mouth Daily., Disp: , Rfl:     rosuvastatin (CRESTOR) 10 MG tablet, TAKE 1 TABLET BY MOUTH DAILY., Disp: 90 tablet, Rfl: 1    tamsulosin (FLOMAX) 0.4 MG capsule 24 hr capsule, Take 2 capsules by mouth Daily., Disp: 180 capsule, Rfl: 4    vitamin B-12 (CYANOCOBALAMIN) 1000 MCG tablet, Take 1,200 mcg by mouth Daily., Disp: , Rfl:     vitamin E 100 UNIT capsule, Take 1 capsule by mouth Daily., Disp: , Rfl:     Zinc 50 MG capsule, Take  by mouth., Disp: , Rfl:     Insulin Glargine (BASAGLAR KWIKPEN) 100 UNIT/ML injection pen, Inject 90 Units under the skin into the appropriate area as directed Daily for 180 days., Disp: 81 mL, Rfl: 1    Sod Picosulfate-Mag Ox-Cit Acd (Clenpiq) 10-3.5-12 MG-GM -GM/175ML solution, Take 175 mL by mouth 1 (One) Time for 1 dose., Disp: 350 mL, Rfl: 0     Allergies   Allergen Reactions    Codeine Nausea Only and Nausea And Vomiting       Family History   Problem Relation Age of Onset    Other Mother 60        Renal Cancer    Prostate cancer Father     Colon cancer Father 70    Malig Hyperthermia Neg Hx         Social History     Social History Narrative    , 2 adult daughters, 6 grandchildren, lives at home with wife 12.8.22       Objective       Objective     Vital Signs:   /67 (BP Location: Left arm, Patient Position: Sitting, Cuff Size: Adult)   Pulse 90   Ht 188 cm (74.02\")   Wt 121 kg (266 lb)   SpO2 93%   BMI 34.13 kg/m²     Body mass index is 34.13 kg/m².    Physical Exam  Constitutional:       Appearance: Normal appearance.   Pulmonary:      Effort: " Pulmonary effort is normal.   Neurological:      General: No focal deficit present.      Mental Status: He is alert and oriented to person, place, and time.   Psychiatric:         Mood and Affect: Mood normal.         Behavior: Behavior normal.         Physical Exam                 Assessment & Plan          Assessment and Plan    Diagnoses and all orders for this visit:    1. Family history of colon cancer (Primary)  Comments:  father with crc at age 72  Orders:  -     Hepatitis Panel, Acute; Future  -     CBC & Differential; Future  -     Comprehensive Metabolic Panel; Future  -     Ferritin; Future  -     Iron Profile; Future  -     Protime-INR; Future  -     Alpha - 1 - Antitrypsin Phenotype; Future  -     FARRAH; Future  -     Anti-Smooth Muscle Antibody Titer; Future  -     Ceruloplasmin; Future  -     Mitochondrial Antibodies, M2; Future  -     TSH; Future  -     T4, Free; Future  -     EBV Antibody Profile; Future  -     CMV IgG IgM; Future  -     Vitamin B12; Future  -     Folate; Future  -     Testosterone; Future  -     Vitamin D 25 Hydroxy; Future  -     Ammonia; Future  -     Case Request; Standing  -     Follow Anesthesia Guidelines / Protocol; Future  -     Verify NPO; Standing  -     Verify Bowel Prep Was Successful; Standing  -     Give Tap Water Enema If Bowel Prep Insufficient; Standing  -     Follow Anesthesia Guidelines / Protocol; Standing  -     Case Request  -     AFP Tumor Marker    2. Hepatic steatosis  -     Hepatitis Panel, Acute; Future  -     CBC & Differential; Future  -     Comprehensive Metabolic Panel; Future  -     Ferritin; Future  -     Iron Profile; Future  -     Protime-INR; Future  -     Alpha - 1 - Antitrypsin Phenotype; Future  -     FARRAH; Future  -     Anti-Smooth Muscle Antibody Titer; Future  -     Ceruloplasmin; Future  -     Mitochondrial Antibodies, M2; Future  -     TSH; Future  -     T4, Free; Future  -     EBV Antibody Profile; Future  -     CMV IgG IgM; Future  -      Vitamin B12; Future  -     Folate; Future  -     Testosterone; Future  -     Vitamin D 25 Hydroxy; Future  -     Ammonia; Future  -     CT Abdomen Pelvis With Contrast; Future  -     Case Request; Standing  -     Follow Anesthesia Guidelines / Protocol; Future  -     Verify NPO; Standing  -     Verify Bowel Prep Was Successful; Standing  -     Give Tap Water Enema If Bowel Prep Insufficient; Standing  -     Follow Anesthesia Guidelines / Protocol; Standing  -     Case Request  -     AFP Tumor Marker    3. Splenomegaly  -     Hepatitis Panel, Acute; Future  -     CBC & Differential; Future  -     Comprehensive Metabolic Panel; Future  -     Ferritin; Future  -     Iron Profile; Future  -     Protime-INR; Future  -     Alpha - 1 - Antitrypsin Phenotype; Future  -     FARRAH; Future  -     Anti-Smooth Muscle Antibody Titer; Future  -     Ceruloplasmin; Future  -     Mitochondrial Antibodies, M2; Future  -     TSH; Future  -     T4, Free; Future  -     EBV Antibody Profile; Future  -     CMV IgG IgM; Future  -     Vitamin B12; Future  -     Folate; Future  -     Testosterone; Future  -     Vitamin D 25 Hydroxy; Future  -     Ammonia; Future  -     CT Abdomen Pelvis With Contrast; Future  -     Case Request; Standing  -     Follow Anesthesia Guidelines / Protocol; Future  -     Verify NPO; Standing  -     Verify Bowel Prep Was Successful; Standing  -     Give Tap Water Enema If Bowel Prep Insufficient; Standing  -     Follow Anesthesia Guidelines / Protocol; Standing  -     Case Request  -     AFP Tumor Marker    4. Thrombocytopenia  -     Hepatitis Panel, Acute; Future  -     CBC & Differential; Future  -     Comprehensive Metabolic Panel; Future  -     Ferritin; Future  -     Iron Profile; Future  -     Protime-INR; Future  -     Alpha - 1 - Antitrypsin Phenotype; Future  -     FARRAH; Future  -     Anti-Smooth Muscle Antibody Titer; Future  -     Ceruloplasmin; Future  -     Mitochondrial Antibodies, M2; Future  -     TSH;  Future  -     T4, Free; Future  -     EBV Antibody Profile; Future  -     CMV IgG IgM; Future  -     Vitamin B12; Future  -     Folate; Future  -     Testosterone; Future  -     Vitamin D 25 Hydroxy; Future  -     Ammonia; Future  -     CT Abdomen Pelvis With Contrast; Future  -     Case Request; Standing  -     Follow Anesthesia Guidelines / Protocol; Future  -     Verify NPO; Standing  -     Verify Bowel Prep Was Successful; Standing  -     Give Tap Water Enema If Bowel Prep Insufficient; Standing  -     Follow Anesthesia Guidelines / Protocol; Standing  -     Case Request  -     AFP Tumor Marker    5. Abnormal results of liver function studies  -     Hepatitis Panel, Acute; Future  -     Case Request; Standing  -     Follow Anesthesia Guidelines / Protocol; Future  -     Verify NPO; Standing  -     Verify Bowel Prep Was Successful; Standing  -     Give Tap Water Enema If Bowel Prep Insufficient; Standing  -     Follow Anesthesia Guidelines / Protocol; Standing  -     Case Request  -     AFP Tumor Marker    6. Abnormal findings on diagnostic imaging of other specified body structures  -     TSH; Future  -     T4, Free; Future  -     CT Abdomen Pelvis With Contrast; Future  -     Case Request; Standing  -     Follow Anesthesia Guidelines / Protocol; Future  -     Verify NPO; Standing  -     Verify Bowel Prep Was Successful; Standing  -     Give Tap Water Enema If Bowel Prep Insufficient; Standing  -     Follow Anesthesia Guidelines / Protocol; Standing  -     Case Request  -     AFP Tumor Marker    7. Vitamin D deficiency, unspecified  -     Vitamin D 25 Hydroxy; Future  -     Case Request; Standing  -     Follow Anesthesia Guidelines / Protocol; Future  -     Verify NPO; Standing  -     Verify Bowel Prep Was Successful; Standing  -     Give Tap Water Enema If Bowel Prep Insufficient; Standing  -     Follow Anesthesia Guidelines / Protocol; Standing  -     Case Request  -     AFP Tumor Marker    8. Liver lesion  -      CT Abdomen Pelvis With Contrast; Future  -     Case Request; Standing  -     Follow Anesthesia Guidelines / Protocol; Future  -     Verify NPO; Standing  -     Verify Bowel Prep Was Successful; Standing  -     Give Tap Water Enema If Bowel Prep Insufficient; Standing  -     Follow Anesthesia Guidelines / Protocol; Standing  -     Case Request  -     AFP Tumor Marker    9. Throat clearing  -     Case Request; Standing  -     Follow Anesthesia Guidelines / Protocol; Future  -     Verify NPO; Standing  -     Verify Bowel Prep Was Successful; Standing  -     Give Tap Water Enema If Bowel Prep Insufficient; Standing  -     Follow Anesthesia Guidelines / Protocol; Standing  -     Case Request  -     AFP Tumor Marker    10. Class 1 obesity with body mass index (BMI) of 34.0 to 34.9 in adult, unspecified obesity type, unspecified whether serious comorbidity present  -     Case Request; Standing  -     Follow Anesthesia Guidelines / Protocol; Future  -     Verify NPO; Standing  -     Verify Bowel Prep Was Successful; Standing  -     Give Tap Water Enema If Bowel Prep Insufficient; Standing  -     Follow Anesthesia Guidelines / Protocol; Standing  -     Case Request  -     AFP Tumor Marker    11. Encounter for observation for other suspected diseases and conditions ruled out  -     Case Request; Standing  -     Case Request  -     AFP Tumor Marker    Other orders  -     Sod Picosulfate-Mag Ox-Cit Acd (Clenpiq) 10-3.5-12 MG-GM -GM/175ML solution; Take 175 mL by mouth 1 (One) Time for 1 dose.  Dispense: 350 mL; Refill: 0      Assessment & Plan  1. Hepatic steatosis.  Imaging from December showed an enlarged liver, likely due to hepatic steatosis, and an indeterminate area that could be a hemangioma. The spleen was also slightly enlarged, which may contribute to decreased platelet count. Weight loss is crucial to reversing hepatic steatosis and preventing progression to fibrosis or cirrhosis. He is advised to lose 20 to 25  pounds and discuss weight loss medications like Mounjaro with Dr. Suzanna Young. A CT scan will be ordered to further evaluate the liver lesion. Lab work will be conducted to assess liver enzymes, thyroid function, CBC, vitamin levels, and ammonia levels.    2. Thrombocytopenia.  The decreased platelet count may be related to splenomegaly. Lab work will be conducted to assess liver enzymes, thyroid function, CBC, vitamin levels, and ammonia levels.    3. Diabetes Mellitus.  He is currently on Trulicity. He is advised to discuss with Dr. Suzanna Young the possibility of using medications like Mounjaro, which can aid in both diabetes management and weight loss.    4. Fatigue.  He reports significant fatigue, which may be related to his back issues or other underlying conditions. Lab work will be conducted to assess for any infections, thyroid dysfunction, or vitamin deficiencies that could contribute to fatigue.    5. Health Maintenance.  An EGD and colonoscopy will be scheduled with Dr. Tapia in June 2025. The risks associated with these procedures, including sedation, bleeding, perforation, and infection, have been discussed.    PROCEDURE  The patient underwent extensive back surgery, lasting approximately 10 hours.          Follow Up       Follow Up   Return in about 6 months (around 9/13/2025).  Patient was given instructions and counseling regarding his condition or for health maintenance advice. Please see specific information pulled into the AVS if appropriate.

## 2025-03-12 ENCOUNTER — TELEPHONE (OUTPATIENT)
Dept: DIABETES SERVICES | Facility: HOSPITAL | Age: 70
End: 2025-03-12

## 2025-03-12 NOTE — TELEPHONE ENCOUNTER
"  Hub staff attempted to follow warm transfer process and was unsuccessful     Caller: Chaparro Weber \"ML\"    Relationship to patient: Self    Best call back number: [phone]    Patient is needing: PT IS WANTING TO SPEAK WITH SHARLA. VERY IMPORTANT. PLEASE CALL PT TO ADVISE      "

## 2025-03-13 ENCOUNTER — OFFICE VISIT (OUTPATIENT)
Dept: GASTROENTEROLOGY | Facility: CLINIC | Age: 70
End: 2025-03-13
Payer: MEDICARE

## 2025-03-13 ENCOUNTER — TELEPHONE (OUTPATIENT)
Dept: GASTROENTEROLOGY | Facility: CLINIC | Age: 70
End: 2025-03-13
Payer: MEDICARE

## 2025-03-13 VITALS
BODY MASS INDEX: 34.14 KG/M2 | DIASTOLIC BLOOD PRESSURE: 67 MMHG | SYSTOLIC BLOOD PRESSURE: 118 MMHG | WEIGHT: 266 LBS | HEIGHT: 74 IN | HEART RATE: 90 BPM | OXYGEN SATURATION: 93 %

## 2025-03-13 DIAGNOSIS — Z03.89 ENCOUNTER FOR OBSERVATION FOR OTHER SUSPECTED DISEASES AND CONDITIONS RULED OUT: ICD-10-CM

## 2025-03-13 DIAGNOSIS — E55.9 VITAMIN D DEFICIENCY, UNSPECIFIED: ICD-10-CM

## 2025-03-13 DIAGNOSIS — R16.1 SPLENOMEGALY: ICD-10-CM

## 2025-03-13 DIAGNOSIS — K76.0 HEPATIC STEATOSIS: ICD-10-CM

## 2025-03-13 DIAGNOSIS — E66.811 CLASS 1 OBESITY WITH BODY MASS INDEX (BMI) OF 34.0 TO 34.9 IN ADULT, UNSPECIFIED OBESITY TYPE, UNSPECIFIED WHETHER SERIOUS COMORBIDITY PRESENT: ICD-10-CM

## 2025-03-13 DIAGNOSIS — R93.89 ABNORMAL FINDINGS ON DIAGNOSTIC IMAGING OF OTHER SPECIFIED BODY STRUCTURES: ICD-10-CM

## 2025-03-13 DIAGNOSIS — R09.89 THROAT CLEARING: ICD-10-CM

## 2025-03-13 DIAGNOSIS — Z80.0 FAMILY HISTORY OF COLON CANCER: Primary | ICD-10-CM

## 2025-03-13 DIAGNOSIS — K76.9 LIVER LESION: ICD-10-CM

## 2025-03-13 DIAGNOSIS — D69.6 THROMBOCYTOPENIA: ICD-10-CM

## 2025-03-13 DIAGNOSIS — R94.5 ABNORMAL RESULTS OF LIVER FUNCTION STUDIES: ICD-10-CM

## 2025-03-13 RX ORDER — SODIUM PICOSULFATE, MAGNESIUM OXIDE, AND ANHYDROUS CITRIC ACID 12; 3.5; 1 G/175ML; G/175ML; MG/175ML
175 LIQUID ORAL ONCE
Qty: 350 ML | Refills: 0 | Status: SHIPPED | OUTPATIENT
Start: 2025-03-13 | End: 2025-03-13

## 2025-03-13 NOTE — TELEPHONE ENCOUNTER
Contacted patient, stated that he is having trouble with his PAP for his Trulicity, Basaglar and Lyumjev.    Wanted to know if there was an update, he still has not gotten his medication and is getting close to running out.

## 2025-03-13 NOTE — TELEPHONE ENCOUNTER
PROCEDURE:  EGD/COLONOSCOPY  DATE:   04.10.25    CARDIAC Clearance letter has been faxed to:      PROVIDER:  DR ALEJANDRA HAMEED  PHONE:  [phone]  FAX:  [phone] (PER SUSAN)

## 2025-03-17 ENCOUNTER — TELEPHONE (OUTPATIENT)
Dept: GASTROENTEROLOGY | Facility: CLINIC | Age: 70
End: 2025-03-17
Payer: MEDICARE

## 2025-03-17 ENCOUNTER — OFFICE VISIT (OUTPATIENT)
Dept: DIABETES SERVICES | Facility: HOSPITAL | Age: 70
End: 2025-03-17
Payer: MEDICARE

## 2025-03-17 VITALS
BODY MASS INDEX: 33.93 KG/M2 | HEIGHT: 74 IN | OXYGEN SATURATION: 91 % | DIASTOLIC BLOOD PRESSURE: 67 MMHG | SYSTOLIC BLOOD PRESSURE: 111 MMHG | WEIGHT: 264.4 LBS | HEART RATE: 80 BPM

## 2025-03-17 DIAGNOSIS — E11.65 UNCONTROLLED TYPE 2 DIABETES MELLITUS WITH HYPERGLYCEMIA: Primary | ICD-10-CM

## 2025-03-17 DIAGNOSIS — Z79.4 TYPE 2 DIABETES MELLITUS WITH STAGE 2 CHRONIC KIDNEY DISEASE, WITH LONG-TERM CURRENT USE OF INSULIN: ICD-10-CM

## 2025-03-17 DIAGNOSIS — E66.9 OBESITY (BMI 30-39.9): ICD-10-CM

## 2025-03-17 DIAGNOSIS — N18.2 TYPE 2 DIABETES MELLITUS WITH STAGE 2 CHRONIC KIDNEY DISEASE, WITH LONG-TERM CURRENT USE OF INSULIN: ICD-10-CM

## 2025-03-17 DIAGNOSIS — Z79.4 TYPE 2 DIABETES MELLITUS WITH DIABETIC NEUROPATHY, WITH LONG-TERM CURRENT USE OF INSULIN: ICD-10-CM

## 2025-03-17 DIAGNOSIS — E11.40 TYPE 2 DIABETES MELLITUS WITH DIABETIC NEUROPATHY, WITH LONG-TERM CURRENT USE OF INSULIN: ICD-10-CM

## 2025-03-17 DIAGNOSIS — Z97.8 USES SELF-APPLIED CONTINUOUS GLUCOSE MONITORING DEVICE: ICD-10-CM

## 2025-03-17 DIAGNOSIS — E11.22 TYPE 2 DIABETES MELLITUS WITH STAGE 2 CHRONIC KIDNEY DISEASE, WITH LONG-TERM CURRENT USE OF INSULIN: ICD-10-CM

## 2025-03-17 LAB
EXPIRATION DATE: ABNORMAL
GLUCOSE BLDC GLUCOMTR-MCNC: 201 MG/DL (ref 70–99)
HBA1C MFR BLD: 7.1 % (ref 4.5–5.7)
Lab: ABNORMAL

## 2025-03-17 PROCEDURE — G0463 HOSPITAL OUTPT CLINIC VISIT: HCPCS | Performed by: NURSE PRACTITIONER

## 2025-03-17 PROCEDURE — 99214 OFFICE O/P EST MOD 30 MIN: CPT | Performed by: NURSE PRACTITIONER

## 2025-03-17 PROCEDURE — 3051F HG A1C>EQUAL 7.0%<8.0%: CPT | Performed by: NURSE PRACTITIONER

## 2025-03-17 PROCEDURE — 1159F MED LIST DOCD IN RCRD: CPT | Performed by: NURSE PRACTITIONER

## 2025-03-17 PROCEDURE — 82948 REAGENT STRIP/BLOOD GLUCOSE: CPT | Performed by: NURSE PRACTITIONER

## 2025-03-17 PROCEDURE — G2211 COMPLEX E/M VISIT ADD ON: HCPCS | Performed by: NURSE PRACTITIONER

## 2025-03-17 PROCEDURE — 3074F SYST BP LT 130 MM HG: CPT | Performed by: NURSE PRACTITIONER

## 2025-03-17 PROCEDURE — 83036 HEMOGLOBIN GLYCOSYLATED A1C: CPT | Performed by: NURSE PRACTITIONER

## 2025-03-17 PROCEDURE — 3078F DIAST BP <80 MM HG: CPT | Performed by: NURSE PRACTITIONER

## 2025-03-17 PROCEDURE — 95251 CONT GLUC MNTR ANALYSIS I&R: CPT | Performed by: NURSE PRACTITIONER

## 2025-03-17 PROCEDURE — 1160F RVW MEDS BY RX/DR IN RCRD: CPT | Performed by: NURSE PRACTITIONER

## 2025-03-17 RX ORDER — ACYCLOVIR 800 MG/1
1 TABLET ORAL
Qty: 6 EACH | Refills: 1 | Status: SHIPPED | OUTPATIENT
Start: 2025-03-17

## 2025-03-17 NOTE — PROGRESS NOTES
Chief Complaint  Diabetes (Med management, A1C, CGM Eval)    Referred By: Charlotte Kulkarni DO    Subjective     {Problem List  Visit Diagnosis   Encounters  Notes  Medications  Labs  Result Review Imaging  Media :23}     Patient or patient representative verbalized consent for the use of Ambient Listening during the visit with  ANGLE Mcdonnell for chart documentation. 3/17/2025  11:27 EDT    Chaparro Weber presents to [facility] for diabetes medication management    History of Present Illness    Visit type:  follow-up  Diabetes type:  Type 2  Current diabetes status/concerns/issues:     History of Present Illness        Current Diabetes symptoms:    Polyuria: No   Polydipsia: No   Polyphagia: No   Blurred vision: No   Excessive fatigue: No  Known Diabetes complications:  Neuropathy: Tingling; Location: Feet  Renal: Stage II mild (GFR = 60-89 mL/min) and Microalbuminuria - NEGATIVE  Eyes: No current eye exam available in record; Location: N/A  Amputation/Wounds: None  GI: None  Cardiovascular: Hypertension, Hyperlipidemia, CHF and Cardiomyopathy  ED: None  Other: None  Hypoglycemia:  None reported at this time  Hypoglycemia Symptoms:  No hypoglycemia at this time  Current diabetes treatment:  Basaglar 55 units in the AM and 50 units in the PM and Lyumjev insulin before each meal taking 8, 10, or 12 units based on small, medium, or large amounts of carbohydrates plus adding correction for glucose levels greater than 150 mg/dL starting with 2 units and increasing by 1 unit for every 25 mg/dL thereafter.  He states he is taking 25-35 units of Lyumjev with each meal.  Jardiance 25 mg once a day.  Trulicity 4.5 mg once weekly.   Blood glucose device:  Patel CGM  Blood glucose monitoring frequency:  Continuous per CGM  Blood glucose range/average:  The 14-day sensor report shows an average glucose of 169 mg/dL, with 64% in target range ( mgdL), 32% in the high range  (181-250 mg/dL), 4% in the very high range (>250 mg/dL), 0% in the low range (54-70 mg/dL) and 0% in the very low range (<54 mg/dL).   Glucose Source: Device Reviewed  Diet:  Limits high carb/sweet foods, Avoids sugary drinks  Activity/Exercise:  None    Past Medical History:   Diagnosis Date    Anxiety     Arthritis     Asthma     - PT DENIED ASTHMA- NO INHALERS    Bradycardia     [facility]ED DENIED CP/SOB    Chest pain     NO RECENT CP    CHF (congestive heart failure)     ASYMPTOMATIC    Colon polyp     Condition not found     Hernia-Unspecified    Coronary artery disease     Deafness     Diabetes     BG RUNS AROUND 250-300 IN AM    Fatty liver     HBP (high blood pressure)     Heart disease     High cholesterol     Kidney stones     Lung disease     Night sweats     Ringing in ear     Sleep apnea     Type 2 diabetes mellitus with autonomic neuropathy 07/16/2021     Past Surgical History:   Procedure Laterality Date    APPENDECTOMY      BACK SURGERY  2000,2010    lumbar with hardware    CARDIAC DEFIBRILLATOR PLACEMENT      MEDITRONIC    CHOLECYSTECTOMY      COLONOSCOPY N/A 06/22/2022    Procedure: COLONOSCOPY;  Surgeon: Nicholas Townsend MD;  Location: [facility] ENDOSCOPY;  Service: General;  Laterality: N/A;  DIVERTICULOSIS    CYSTOSCOPY W/ STONE MANIPULATION Right 1/5/2023    Procedure: CYSTOSCOPY KIDNEY STONE MANIPULATION/EXTRACTION;  Surgeon: Ann Mon MD;  Location: [facility] MAIN OR;  Service: Urology;  Laterality: Right;    KIDNEY STONE SURGERY      NECK SURGERY  2005    removal of bone spurs    PACEMAKER IMPLANTATION      10 YEARS AGO PLACED- [facility]    URETEROSCOPY LASER LITHOTRIPSY WITH STENT INSERTION Left 1/5/2023    Procedure: LEFT URETEROSCOPY LASER LITHOTRIPSY WITH STENT INSERTION AND RETROGRADE; RIGHT URETEROSCOPY WITH STENT INSERTION AND RETROGRADE;  Surgeon: Ann Mon MD;  Location: [facility] MAIN OR;  Service: Urology;  Laterality: Left;      Family History   Problem Relation Age of Onset    Other Mother 60        Renal Cancer    Prostate cancer Father     Colon cancer Father 70    Malig Hyperthermia Neg Hx      Social History     Socioeconomic History    Marital status:     Number of children: 2   Tobacco Use    Smoking status: Former     Current packs/day: 0.00     Average packs/day: 1.5 packs/day for 40.0 years (60.0 ttl pk-yrs)     Types: Cigarettes     Start date: 1965     Quit date:      Years since quittin.2     Passive exposure: Past    Smokeless tobacco: Never    Tobacco comments:     no second hand smoke exposure   Vaping Use    Vaping status: Never Used   Substance and Sexual Activity    Alcohol use: Never    Drug use: Never    Sexual activity: Defer     Allergies   Allergen Reactions    Codeine Nausea Only and Nausea And Vomiting       Current Outpatient Medications:     aspirin  MG tablet, Take 1 tablet by mouth Daily. PT STATED DR CHEN OFFICE INSTRUCTED TO STOP 5 DAYS PRIOR TO SURGERY, LAST DOSE PT STATED INSTRUCTED TO TAKE IS 22, Disp: , Rfl:     B Complex Vitamins (vitamin b complex) capsule capsule, Take  by mouth Daily., Disp: , Rfl:     Biotin 07615 MCG tablet, Take  by mouth., Disp: , Rfl:     celecoxib (CeleBREX) 100 MG capsule, TAKE ONE CAPSULE BY MOUTH TWICE A DAY AS DIRECTED FOR 30 DAYS., Disp: 180 capsule, Rfl: 2    cholecalciferol (VITAMIN D3) 25 MCG (1000 UT) tablet, Take 2 tablets by mouth Daily., Disp: , Rfl:     Continuous Glucose Sensor (FreeStyle Patel 3 Sensor) misc, USE 1 EACH EVERY 14 (FOURTEEN) DAYS., Disp: 6 each, Rfl: 2    cyclobenzaprine (FLEXERIL) 5 MG tablet, Take 1-2 tablets prn tid for muscle spasm, Disp: 90 tablet, Rfl: 1    Dulaglutide (Trulicity) 4.5 MG/0.5ML solution auto-injector, Inject 4.5 mg under the skin into the appropriate area as directed 1 (One) Time Per Week., Disp: 6 mL, Rfl: 3    escitalopram (LEXAPRO) 20 MG tablet, TAKE ONE TABLET BY MOUTH ONCE DAILY, Disp:  30 tablet, Rfl: 2    fenofibrate (TRICOR) 145 MG tablet, TAKE ONE TABLET BY MOUTH ONCE DAILY, Disp: 90 tablet, Rfl: 2    glucose blood test strip, Use as instructed, Disp: 100 each, Rfl: 2    glucose blood test strip, OneTouch Ultra Test strips, Disp: , Rfl:     IBUPROFEN IB PO, Take  by mouth., Disp: , Rfl:     Insulin Glargine (BASAGLAR KWIKPEN) 100 UNIT/ML injection pen, Inject 90 Units under the skin into the appropriate area as directed Daily for 180 days., Disp: 81 mL, Rfl: 1    Insulin Pen Needle (Comfort EZ Pen Needles) 32G X 5 MM misc, USE 1 EACH 5 (FIVE) TIMES A DAY., Disp: 450 each, Rfl: 1    Lyumjev KwikPen 200 UNIT/ML solution pen-injector, Inject 40 Units under the skin into the appropriate area as directed 3 (Three) Times a Day Before Meals., Disp: 72 mL, Rfl: 4    methylcellulose, Laxative, (CITRUCEL) 500 MG tablet tablet, , Disp: , Rfl:     metoprolol succinate XL (TOPROL-XL) 25 MG 24 hr tablet, Take 1 tablet by mouth Daily., Disp: , Rfl:     multivitamin with minerals (PRESERVISION AREDS PO), Take 1 tablet by mouth Daily., Disp: , Rfl:     NON FORMULARY, Move free joint health, Disp: , Rfl:     Omega-3 Fatty Acids (fish oil) 1200 MG capsule capsule, Take 1 capsule by mouth Daily., Disp: , Rfl:     oxyCODONE-acetaminophen (PERCOCET)  MG per tablet, Take 1 tablet by mouth Every 4 (Four) Hours As Needed., Disp: , Rfl:     pregabalin (LYRICA) 300 MG capsule, Take 1 capsule by mouth Daily., Disp: , Rfl:     psyllium (Metamucil) 0.52 g capsule, , Disp: , Rfl:     QUERCETIN PO, Take 1,000 mg by mouth Daily., Disp: , Rfl:     rosuvastatin (CRESTOR) 10 MG tablet, TAKE 1 TABLET BY MOUTH DAILY., Disp: 90 tablet, Rfl: 1    tamsulosin (FLOMAX) 0.4 MG capsule 24 hr capsule, Take 2 capsules by mouth Daily., Disp: 180 capsule, Rfl: 4    vitamin B-12 (CYANOCOBALAMIN) 1000 MCG tablet, Take 1,200 mcg by mouth Daily., Disp: , Rfl:     vitamin E 100 UNIT capsule, Take 1 capsule by mouth Daily., Disp: , Rfl:      "Zinc 50 MG capsule, Take  by mouth., Disp: , Rfl:     Objective     Vitals:    03/17/25 1059   BP: 111/67   BP Location: Left arm   Pulse: 80   SpO2: 91%   Weight: 120 kg (264 lb 6.4 oz)   Height: 188 cm (74.02\")     Body mass index is 33.93 kg/m².    Physical Exam  Constitutional:       Appearance: Normal appearance. He is obese.      Comments: Obesity (BMI 30 - 39.9) Pt Current BMI = 33.93    HENT:      Head: Normocephalic and atraumatic.      Right Ear: External ear normal.      Left Ear: External ear normal.      Nose: Nose normal.   Eyes:      Extraocular Movements: Extraocular movements intact.      Conjunctiva/sclera: Conjunctivae normal.   Pulmonary:      Effort: Pulmonary effort is normal.   Musculoskeletal:         General: Normal range of motion.      Cervical back: Normal range of motion.   Skin:     General: Skin is warm and dry.   Neurological:      General: No focal deficit present.      Mental Status: He is alert and oriented to person, place, and time. Mental status is at baseline.   Psychiatric:         Mood and Affect: Mood normal.         Behavior: Behavior normal.         Thought Content: Thought content normal.         Judgment: Judgment normal.         {DIABETIC FOOT EXAM FOR  (Optional):18958::\"Diabetic Foot Exam Performed\":0}    Result Review :{Labs  Result Review  Imaging  Med Tab  Media :23}   The following data was reviewed by: ANGLE Mcdonnell on 03/17/2025:    Most Recent A1C          3/17/2025    11:20   HGBA1C Most Recent   Hemoglobin A1C 7.1        A1C Last 3 Results          9/24/2024    11:37 12/16/2024    11:18 3/17/2025    11:20   HGBA1C Last 3 Results   Hemoglobin A1C 7.1  7.2  7.1      A1c collected in the office today is 7.1%, indicating Uncontrolled Type II diabetes.  This result is down from the prior result of 7.2% collected on 12/16/24     Glucose   Date Value Ref Range Status   03/17/2025 201 (H) 70 - 99 mg/dL Final     Comment:     Serial Number: " [id number]:  [id number]     Point of care glucose in the office today is  elevated at 201 mg/dL         {CC Problem List  Visit Diagnosis  ROS  Review (Popup)  Health Maintenance  Quality  BestPractice  Medications  SmartSets  SnapShot Encounters  Media :23}       Diagnoses and all orders for this visit:    1. Uncontrolled type 2 diabetes mellitus with hyperglycemia (Primary)  -     POC Glycosylated Hemoglobin (Hb A1C)    Other orders  -     POC Glucose        Assessment & Plan          The patient will monitor his blood glucose levels per continuous glucose monitor.  If he develops problematic hyperglycemia or hypoglycemia or adverse drug reactions, he will contact the office for further instructions.    {Time Spent (Optional):96500}    Follow Up {Instructions Charge Capture  Follow-up Communications :23}    No follow-ups on file.    Patient was given instructions and counseling regarding his condition or for health maintenance advice. Please see specific information pulled into the AVS if appropriate.     Suzanna Agosto, APRN  03/17/2025        Dictated Utilizing Dragon Dictation.  Please note that portions of this note were completed with a voice recognition program.  Part of this note may be an electronic transcription/translation of spoken language to printed text using the Dragon Dictation System.   He is currently on Trulicity 4.5 mg once weekly, Basaglar 50 units in the morning and 50 units at night, Lyumjev 25-30 units, and Jardiance 25 mg once a day. He was advised to continue his current medication regimen. The potential benefits of switching from Trulicity to Ozempic were discussed, but he prefers to maintain his current medications. He was also advised to monitor his carbohydrate and fat intake and to increase physical activity, including chair exercises and using a pedal machine. A prescription for Patel 3 was provided.    2. Weight Management.  He was advised to aim for a weight loss of 40 pounds. It was explained that while reducing carbohydrate intake can aid in weight loss, consuming high-fat foods may counteract this effect. He was informed that to lose one pound, a deficit of 3500 calories per week is required, equivalent to 500 calories per day. He was advised to increase his physical activity, including chair exercises and using a pedal machine, to burn additional calories. He was cautioned against overexerting himself and to gradually increase his exercise routine.          The patient will monitor his blood glucose levels per continuous glucose monitor.  If he develops problematic hyperglycemia or hypoglycemia or adverse drug reactions, he will contact the office for further instructions.        Follow Up     Return in about 3 months (around 6/17/2025) for Medication Management, CGM Follow-up.    Patient was given instructions and counseling regarding his condition or for health maintenance advice. Please see specific information pulled into the AVS if appropriate.     Suzanna Agosto, APRN  03/17/2025        Dictated Utilizing Dragon Dictation.  Please note that portions of this note were completed with a voice recognition program.  Part of this note may be an electronic transcription/translation of spoken language to printed text using the Dragon Dictation System.

## 2025-03-17 NOTE — TELEPHONE ENCOUNTER
Patient called into the office today, stated bowel prep was $75. Patient has requested to do the magnesium citrate prep for his colonoscopy instead. I am sending patient OTC bowel prep for colonoscopy.

## 2025-03-21 ENCOUNTER — TELEPHONE (OUTPATIENT)
Dept: GASTROENTEROLOGY | Facility: CLINIC | Age: 70
End: 2025-03-21
Payer: MEDICARE

## 2025-03-21 NOTE — TELEPHONE ENCOUNTER
Verify source of procedure(s): Office visit Rema Mendiola, APRN 03/2025  If other, please list source:   TIME OUT-CONFIRM CORRECT PROCEDURE: EGD & Colonoscopy  Cardiology: Nikita Mtathew MD  clearance received  Pulmonology: none  Blood thinner: none  GLP-1: Trulicity  Additional DX/indication for procedure: Throat Clearing, fm hx colon cancer-Father, personal hx colon polyps  Please include any other notes relevant to endo reconciliation:  N/A

## 2025-04-03 ENCOUNTER — HOSPITAL ENCOUNTER (OUTPATIENT)
Dept: CT IMAGING | Facility: HOSPITAL | Age: 70
Discharge: HOME | End: 2025-04-03
Admitting: NURSE PRACTITIONER
Payer: MEDICARE

## 2025-04-03 DIAGNOSIS — R93.89 ABNORMAL FINDINGS ON DIAGNOSTIC IMAGING OF OTHER SPECIFIED BODY STRUCTURES: ICD-10-CM

## 2025-04-03 DIAGNOSIS — K76.9 LIVER LESION: ICD-10-CM

## 2025-04-03 DIAGNOSIS — R16.1 SPLENOMEGALY: ICD-10-CM

## 2025-04-03 DIAGNOSIS — D69.6 THROMBOCYTOPENIA: ICD-10-CM

## 2025-04-03 DIAGNOSIS — K76.0 HEPATIC STEATOSIS: ICD-10-CM

## 2025-04-03 LAB
CREAT BLDA-MCNC: 1.2 MG/DL (ref 0.6–1.3)
EGFRCR SERPLBLD CKD-EPI 2021: 66.3 ML/MIN/1.73

## 2025-04-03 PROCEDURE — 74177 CT ABD & PELVIS W/CONTRAST: CPT

## 2025-04-03 PROCEDURE — 25510000001 IOPAMIDOL PER 1 ML: Performed by: NURSE PRACTITIONER

## 2025-04-03 PROCEDURE — 82565 ASSAY OF CREATININE: CPT

## 2025-04-03 RX ORDER — IOPAMIDOL 755 MG/ML
100 INJECTION, SOLUTION INTRAVASCULAR
Status: COMPLETED | OUTPATIENT
Start: 2025-04-03 | End: 2025-04-03

## 2025-04-03 RX ADMIN — IOPAMIDOL 100 ML: 755 INJECTION, SOLUTION INTRAVENOUS at 10:05

## 2025-04-03 NOTE — PRE-PROCEDURE INSTRUCTIONS
Reminded of arrival time at 0730 , Entrance C of the [facility] hospital. Instructed to bring or have a  over the age of 18 set up to drive you home the day of procedure.    Instructed on clear liquid diet the day before, nothing red or purple. Call with any questions about the prep or if in need of the prep.  Reminded them not to eat or drink anything am of procedure unless its a sip of water with medications.  Hold celebrex, ibuprofen, aspirin, diabetic meds am of procedure. Hold trulicity 7 days prior to procedure, last dose 3/29. Patient verbalized understanding.

## 2025-04-08 ENCOUNTER — ANESTHESIA EVENT (OUTPATIENT)
Dept: GASTROENTEROLOGY | Facility: HOSPITAL | Age: 70
End: 2025-04-08
Payer: MEDICARE

## 2025-04-08 NOTE — ANESTHESIA PREPROCEDURE EVALUATION
Anesthesia Evaluation     Patient summary reviewed and Nursing notes reviewed   NPO Solid Status: > 8 hours  NPO Liquid Status: > 6 hours           Airway   Mallampati: II  TM distance: <3 FB  Neck ROM: full  No difficulty expected and Large neck circumference  Dental - normal exam     Pulmonary     breath sounds clear to auscultation  (+) ,sleep apnea (noncompliant with cpap)  Cardiovascular   Exercise tolerance: good (4-7 METS)    Rhythm: regular  Rate: normal    (+) pacemaker pacemaker, ICD interrogated <1 month ago, hypertension well controlled, CAD, dysrhythmias, CHF , hyperlipidemia      Neuro/Psych  (+) numbness, psychiatric history  GI/Hepatic/Renal/Endo    (+) obesity, morbid obesity, GERD well controlled, liver disease fatty liver disease, renal disease- stones, diabetes mellitus type 2 well controlled    Musculoskeletal     (+) back pain (L2-5 fusion)  Abdominal   (+) obese    Abdomen: soft.   Substance History      OB/GYN          Other   arthritis,     ROS/Med Hx Other: Last GLP1 dose was 3/29/25    EKG:   HEART RATE= 79  bpm  RR Interval= 761  ms  WI Interval= 200  ms  P Horizontal Axis= -31  deg  P Front Axis= 34  deg  QRSD Interval= 118  ms  QT Interval= 417  ms  QRS Axis= 3  deg  T Wave Axis= 157  deg  - ABNORMAL ECG -  Atrial-sensed ventricular-paced rhythm    Cardiac Clearance on chart. Per Dr. Matthew on 03/13/25                         Anesthesia Plan    ASA 4     general   total IV anesthesia  (Total IV Anesthesia    Patient understands anesthesia not responsible for dental damage.      Discussed risks with pt including aspiration, allergic reactions, apnea, advanced airway placement. Pt verbalized understanding. All questions answered.     )  intravenous induction     Anesthetic plan, risks, benefits, and alternatives have been provided, discussed and informed consent has been obtained with: patient.  Pre-procedure education provided  Plan discussed with CRNA.      CODE STATUS:

## 2025-04-10 ENCOUNTER — ANESTHESIA (OUTPATIENT)
Dept: GASTROENTEROLOGY | Facility: HOSPITAL | Age: 70
End: 2025-04-10
Payer: MEDICARE

## 2025-04-10 ENCOUNTER — HOSPITAL ENCOUNTER (OUTPATIENT)
Facility: HOSPITAL | Age: 70
Setting detail: HOSPITAL OUTPATIENT SURGERY
Discharge: HOME | End: 2025-04-10
Attending: INTERNAL MEDICINE | Admitting: INTERNAL MEDICINE
Payer: MEDICARE

## 2025-04-10 VITALS
DIASTOLIC BLOOD PRESSURE: 69 MMHG | BODY MASS INDEX: 32.96 KG/M2 | OXYGEN SATURATION: 91 % | TEMPERATURE: 97.2 F | WEIGHT: 256.84 LBS | HEART RATE: 88 BPM | RESPIRATION RATE: 15 BRPM | SYSTOLIC BLOOD PRESSURE: 114 MMHG

## 2025-04-10 DIAGNOSIS — R16.1 SPLENOMEGALY: ICD-10-CM

## 2025-04-10 DIAGNOSIS — E66.811 CLASS 1 OBESITY WITH BODY MASS INDEX (BMI) OF 34.0 TO 34.9 IN ADULT, UNSPECIFIED OBESITY TYPE, UNSPECIFIED WHETHER SERIOUS COMORBIDITY PRESENT: ICD-10-CM

## 2025-04-10 DIAGNOSIS — D69.6 THROMBOCYTOPENIA: ICD-10-CM

## 2025-04-10 DIAGNOSIS — K76.9 LIVER LESION: ICD-10-CM

## 2025-04-10 DIAGNOSIS — Z80.0 FAMILY HISTORY OF COLON CANCER: ICD-10-CM

## 2025-04-10 DIAGNOSIS — R94.5 ABNORMAL RESULTS OF LIVER FUNCTION STUDIES: ICD-10-CM

## 2025-04-10 DIAGNOSIS — R93.89 ABNORMAL FINDINGS ON DIAGNOSTIC IMAGING OF OTHER SPECIFIED BODY STRUCTURES: ICD-10-CM

## 2025-04-10 DIAGNOSIS — R09.89 THROAT CLEARING: ICD-10-CM

## 2025-04-10 DIAGNOSIS — K76.0 HEPATIC STEATOSIS: ICD-10-CM

## 2025-04-10 DIAGNOSIS — E55.9 VITAMIN D DEFICIENCY, UNSPECIFIED: ICD-10-CM

## 2025-04-10 DIAGNOSIS — Z03.89 ENCOUNTER FOR OBSERVATION FOR OTHER SUSPECTED DISEASES AND CONDITIONS RULED OUT: ICD-10-CM

## 2025-04-10 LAB — GLUCOSE BLDC GLUCOMTR-MCNC: 83 MG/DL (ref 70–99)

## 2025-04-10 PROCEDURE — 82948 REAGENT STRIP/BLOOD GLUCOSE: CPT

## 2025-04-10 PROCEDURE — 45385 COLONOSCOPY W/LESION REMOVAL: CPT | Performed by: INTERNAL MEDICINE

## 2025-04-10 PROCEDURE — 25010000002 PROPOFOL 10 MG/ML EMULSION: Performed by: NURSE ANESTHETIST, CERTIFIED REGISTERED

## 2025-04-10 PROCEDURE — 25810000003 LACTATED RINGERS PER 1000 ML

## 2025-04-10 PROCEDURE — 88305 TISSUE EXAM BY PATHOLOGIST: CPT | Performed by: INTERNAL MEDICINE

## 2025-04-10 PROCEDURE — 43239 EGD BIOPSY SINGLE/MULTIPLE: CPT | Performed by: INTERNAL MEDICINE

## 2025-04-10 PROCEDURE — 25010000002 LIDOCAINE PF 2% 2 % SOLUTION: Performed by: NURSE ANESTHETIST, CERTIFIED REGISTERED

## 2025-04-10 RX ORDER — SODIUM CHLORIDE, SODIUM LACTATE, POTASSIUM CHLORIDE, CALCIUM CHLORIDE 600; 310; 30; 20 MG/100ML; MG/100ML; MG/100ML; MG/100ML
30 INJECTION, SOLUTION INTRAVENOUS CONTINUOUS
Status: DISCONTINUED | OUTPATIENT
Start: 2025-04-10 | End: 2025-04-10 | Stop reason: HOSPADM

## 2025-04-10 RX ORDER — PANTOPRAZOLE SODIUM 40 MG/1
40 TABLET, DELAYED RELEASE ORAL DAILY
Qty: 90 TABLET | Refills: 3 | Status: SHIPPED | OUTPATIENT
Start: 2025-04-10

## 2025-04-10 RX ORDER — IBUPROFEN 200 MG
400 TABLET ORAL 2 TIMES DAILY
COMMUNITY

## 2025-04-10 RX ORDER — PROPOFOL 10 MG/ML
VIAL (ML) INTRAVENOUS AS NEEDED
Status: DISCONTINUED | OUTPATIENT
Start: 2025-04-10 | End: 2025-04-10 | Stop reason: SURG

## 2025-04-10 RX ORDER — LIDOCAINE HYDROCHLORIDE 20 MG/ML
INJECTION, SOLUTION EPIDURAL; INFILTRATION; INTRACAUDAL; PERINEURAL AS NEEDED
Status: DISCONTINUED | OUTPATIENT
Start: 2025-04-10 | End: 2025-04-10 | Stop reason: SURG

## 2025-04-10 RX ADMIN — PROPOFOL 100 MG: 10 INJECTION, EMULSION INTRAVENOUS at 09:14

## 2025-04-10 RX ADMIN — SODIUM CHLORIDE, POTASSIUM CHLORIDE, SODIUM LACTATE AND CALCIUM CHLORIDE 30 ML/HR: 600; 310; 30; 20 INJECTION, SOLUTION INTRAVENOUS at 08:59

## 2025-04-10 RX ADMIN — LIDOCAINE HYDROCHLORIDE 100 MG: 20 INJECTION, SOLUTION EPIDURAL; INFILTRATION; INTRACAUDAL; PERINEURAL at 09:14

## 2025-04-10 RX ADMIN — PROPOFOL 200 MCG/KG/MIN: 10 INJECTION, EMULSION INTRAVENOUS at 09:14

## 2025-04-10 NOTE — ANESTHESIA POSTPROCEDURE EVALUATION
Patient: Chaparro Weber    Procedure Summary       Date: 04/10/25 Room / Location: [facility] ENDOSCOPY 2 / [facility] ENDOSCOPY    Anesthesia Start: 0911 Anesthesia Stop: 0952    Procedures:       ESOPHAGOGASTRODUODENOSCOPY with biopsies      COLONOSCOPY with hot snare polypectomies Diagnosis:       Family history of colon cancer      Hepatic steatosis      Splenomegaly      Thrombocytopenia      Abnormal results of liver function studies      Abnormal findings on diagnostic imaging of other specified body structures      Vitamin D deficiency, unspecified      Liver lesion      Throat clearing      Class 1 obesity with body mass index (BMI) of 34.0 to 34.9 in adult, unspecified obesity type, unspecified whether serious comorbidity present      Encounter for observation for other suspected diseases and conditions ruled out      (Family history of colon cancer [Z80.0])      (Hepatic steatosis [K76.0])      (Splenomegaly [R16.1])      (Thrombocytopenia [D69.6])      (Abnormal results of liver function studies [R94.5])      (Abnormal findings on diagnostic imaging of other specified body structures [R93.89])      (Vitamin D deficiency, unspecified [E55.9])      (Liver lesion [K76.9])      (Throat clearing [R09.89])      (Class 1 obesity with body mass index (BMI) of 34.0 to 34.9 in adult, unspecified obesity type, unspecified whether serious comorbidity present [E66.811, Z68.34])      (Encounter for observation for other suspected diseases and conditions ruled out [Z03.89])    Surgeons: Nick Ballard MD Provider: Paul Roberson CRNA    Anesthesia Type: general ASA Status: 4            Anesthesia Type: general    Vitals  Vitals Value Taken Time   /69 04/10/25 10:06   Temp 36.2 °C (97.2 °F) 04/10/25 10:05   Pulse 84 04/10/25 10:08   Resp 15 04/10/25 10:05   SpO2 93 % 04/10/25 10:08   Vitals shown include unfiled device data.        Post Anesthesia Care and Evaluation    Post-procedure mental status:  acceptable.  Pain management: satisfactory to patient    Airway patency: patent  Anesthetic complications: No anesthetic complications    Cardiovascular status: acceptable  Respiratory status: acceptable    Comments: Per chart review

## 2025-04-14 ENCOUNTER — OFFICE VISIT (OUTPATIENT)
Dept: FAMILY MEDICINE CLINIC | Facility: CLINIC | Age: 70
End: 2025-04-14
Payer: MEDICARE

## 2025-04-14 VITALS
HEART RATE: 85 BPM | SYSTOLIC BLOOD PRESSURE: 113 MMHG | DIASTOLIC BLOOD PRESSURE: 59 MMHG | WEIGHT: 266.8 LBS | RESPIRATION RATE: 18 BRPM | HEIGHT: 74 IN | TEMPERATURE: 97.8 F | BODY MASS INDEX: 34.24 KG/M2 | OXYGEN SATURATION: 91 %

## 2025-04-14 DIAGNOSIS — E66.811 CLASS 1 OBESITY DUE TO EXCESS CALORIES WITH SERIOUS COMORBIDITY AND BODY MASS INDEX (BMI) OF 34.0 TO 34.9 IN ADULT: Chronic | ICD-10-CM

## 2025-04-14 DIAGNOSIS — E78.2 MIXED HYPERLIPIDEMIA: Chronic | ICD-10-CM

## 2025-04-14 DIAGNOSIS — I10 ESSENTIAL HYPERTENSION: Chronic | ICD-10-CM

## 2025-04-14 DIAGNOSIS — Z79.4 TYPE 2 DIABETES MELLITUS WITH DIABETIC AUTONOMIC NEUROPATHY, WITH LONG-TERM CURRENT USE OF INSULIN: Chronic | ICD-10-CM

## 2025-04-14 DIAGNOSIS — E11.43 TYPE 2 DIABETES MELLITUS WITH DIABETIC AUTONOMIC NEUROPATHY, WITH LONG-TERM CURRENT USE OF INSULIN: Chronic | ICD-10-CM

## 2025-04-14 DIAGNOSIS — Z00.00 MEDICARE ANNUAL WELLNESS VISIT, SUBSEQUENT: Primary | ICD-10-CM

## 2025-04-14 DIAGNOSIS — H61.23 BILATERAL IMPACTED CERUMEN: ICD-10-CM

## 2025-04-14 DIAGNOSIS — E66.09 CLASS 1 OBESITY DUE TO EXCESS CALORIES WITH SERIOUS COMORBIDITY AND BODY MASS INDEX (BMI) OF 34.0 TO 34.9 IN ADULT: Chronic | ICD-10-CM

## 2025-04-14 PROBLEM — H61.21 IMPACTED CERUMEN OF RIGHT EAR: Status: ACTIVE | Noted: 2025-04-14

## 2025-04-14 PROBLEM — D69.6 THROMBOCYTOPENIA: Chronic | Status: ACTIVE | Noted: 2025-03-13

## 2025-04-14 NOTE — ASSESSMENT & PLAN NOTE
Patient's (Body mass index is 34.24 kg/m².) indicates that they are obese (BMI >30) with health conditions that include hypertension, coronary heart disease, diabetes mellitus, and dyslipidemias . Weight is unchanged. BMI  is above average; BMI management plan is completed. We discussed low calorie, low carb based diet program, portion control, and increasing exercise.

## 2025-04-14 NOTE — ASSESSMENT & PLAN NOTE
Lipid abnormalities are improving with treatment.  Crestor was added today to help lower his cholesterol.   Lipids will be reassessed in 6 months.    The 10-year ASCVD risk score (Ronak DELUNA, et al., 2019) is: 24.2%    Values used to calculate the score:      Age: 69 years      Sex: Male      Is Non- : No      Diabetic: Yes      Tobacco smoker: No      Systolic Blood Pressure: 113 mmHg      Is BP treated: Yes      HDL Cholesterol: 36 mg/dL      Total Cholesterol: 142 mg/dL

## 2025-04-14 NOTE — PROGRESS NOTES
Subjective   The ABCs of the Annual Wellness Visit  Medicare Wellness Visit      Chaparro Weber is a 69 y.o. patient who presents for a Medicare Wellness Visit.    The following portions of the patient's history were reviewed and   updated as appropriate: allergies, current medications, past family history, past medical history, past social history, past surgical history, and problem list.    Compared to one year ago, the patient's physical   health is the same.  Compared to one year ago, the patient's mental   health is the same.    Recent Hospitalizations:  He was not admitted to the hospital during the last year.     Current Medical Providers:  Patient Care Team:  Charlotte Kulkarni DO as PCP - General (Family Medicine)  Daniela Hay APRN as Nurse Practitioner (Nurse Practitioner)  Suzanna Agosto APRN as Nurse Practitioner (Endocrinology)  Rema Mendiola APRN as Nurse Practitioner (Gastroenterology)  Niktia Matthew MD as Consulting Physician (Cardiac Electrophysiology)    Outpatient Medications Prior to Visit   Medication Sig Dispense Refill    aspirin  MG tablet Take 1 tablet by mouth Daily.      B Complex Vitamins (vitamin b complex) capsule capsule Take  by mouth Daily.      Biotin 32092 MCG tablet Take  by mouth.      cholecalciferol (VITAMIN D3) 25 MCG (1000 UT) tablet Take 2 tablets by mouth Daily.      Continuous Glucose Sensor (FreeStyle Patel 3 Sensor) [facility] [place] Use 1 each Every 14 (Fourteen) Days. 6 each 1    cyclobenzaprine (FLEXERIL) 5 MG tablet Take 1-2 tablets prn tid for muscle spasm 90 tablet 1    Dulaglutide (Trulicity) 4.5 MG/0.5ML solution auto-injector Inject 4.5 mg under the skin into the appropriate area as directed 1 (One) Time Per Week. 6 mL 3    escitalopram (LEXAPRO) 20 MG tablet TAKE ONE TABLET BY MOUTH ONCE DAILY 30 tablet 2    fenofibrate (TRICOR) 145 MG tablet TAKE ONE TABLET BY MOUTH ONCE DAILY 90 tablet 2    glucose blood test strip Use as instructed 100 each 2    glucose  blood test strip OneTouch Ultra Test strips      ibuprofen (ADVIL,MOTRIN) 200 MG tablet Take 2 tablets by mouth 2 (Two) Times a Day.      Insulin Glargine (BASAGLAR KWIKPEN) 100 UNIT/ML injection pen Inject 90 Units under the skin into the appropriate area as directed Daily for 180 days. 81 mL 1    Insulin Pen Needle (Comfort EZ Pen Needles) 32G X 5 MM misc USE 1 EACH 5 (FIVE) TIMES A DAY. 450 each 1    Lyumjev KwikPen 200 UNIT/ML solution pen-injector Inject 40 Units under the skin into the appropriate area as directed 3 (Three) Times a Day Before Meals. 72 mL 4    metoprolol succinate XL (TOPROL-XL) 25 MG 24 hr tablet Take 1 tablet by mouth Daily.      multivitamin with minerals (PRESERVISION AREDS PO) Take 1 tablet by mouth Daily.      NON FORMULARY Move free joint health      Omega-3 Fatty Acids (fish oil) 1200 MG capsule capsule Take 1 capsule by mouth Daily.      oxyCODONE-acetaminophen (PERCOCET)  MG per tablet Take 1 tablet by mouth Every 4 (Four) Hours As Needed.      pantoprazole (PROTONIX) 40 MG EC tablet Take 1 tablet by mouth Daily. 90 tablet 3    pregabalin (LYRICA) 300 MG capsule Take 1 capsule by mouth Daily.      psyllium (Metamucil) 0.52 g capsule       QUERCETIN PO Take 1,000 mg by mouth Daily.      tamsulosin (FLOMAX) 0.4 MG capsule 24 hr capsule Take 2 capsules by mouth Daily. 180 capsule 4    vitamin B-12 (CYANOCOBALAMIN) 1000 MCG tablet Take 1,200 mcg by mouth Daily.      vitamin E 100 UNIT capsule Take 1 capsule by mouth Daily.      Zinc 50 MG capsule Take  by mouth.      celecoxib (CeleBREX) 100 MG capsule TAKE ONE CAPSULE BY MOUTH TWICE A DAY AS DIRECTED FOR 30 DAYS. (Patient not taking: Reported on 4/14/2025) 180 capsule 2     No facility-administered medications prior to visit.     Opioid medication/s are on active medication list.  and I have evaluated his active treatment plan and pain score trends (see table).  Vitals:    04/14/25 0941   PainSc: 0-No pain     I have reviewed the  "chart for potential of high risk medication and harmful drug interactions in the elderly.        Aspirin is on active medication list. Aspirin use is indicated based on review of current medical condition/s. Pros and cons of this therapy have been discussed today. Benefits of this medication outweigh potential harm.  Patient has been encouraged to continue taking this medication.  .      Patient Active Problem List   Diagnosis    Acquired polycythemia    Anxiety    Arthritis    Asthma    COPD (chronic obstructive pulmonary disease)    Diabetic peripheral neuropathy    NICM (nonischemic cardiomyopathy)    Left bundle branch block    Congestive heart failure    Kidney stone    Hyperlipidemia    GERD (gastroesophageal reflux disease)    Essential hypertension    Heart disease    Type 2 diabetes mellitus with autonomic neuropathy    Class 1 obesity due to excess calories with serious comorbidity and body mass index (BMI) of 34.0 to 34.9 in adult    Medicare annual wellness visit, subsequent    Osteoarthritis of knee    Chronic bilateral low back pain without sciatica    Family history of colon cancer    Hepatic steatosis    Splenomegaly    Thrombocytopenia    Abnormal results of liver function studies    Abnormal findings on diagnostic imaging of other specified body structures    Vitamin D deficiency, unspecified    Liver lesion    Throat clearing    Encounter for observation for other suspected diseases and conditions ruled out    Bilateral impacted cerumen     Advance Care Planning Advance Directive is on file.  ACP discussion was held with the patient during this visit. Patient has an advance directive in EMR which is still valid.             Objective   Vitals:    04/14/25 0941   BP: 113/59   BP Location: Left arm   Patient Position: Sitting   Cuff Size: Adult   Pulse: 85   Resp: 18   Temp: 97.8 °F (36.6 °C)   TempSrc: Temporal   SpO2: 91%   Weight: 121 kg (266 lb 12.8 oz)   Height: 188 cm (74.02\")   PainSc: 0-No " "pain       Estimated body mass index is 34.24 kg/m² as calculated from the following:    Height as of this encounter: 188 cm (74.02\").    Weight as of this encounter: 121 kg (266 lb 12.8 oz).                Does the patient have evidence of cognitive impairment? No  Lab Results   Component Value Date    HGBA1C 7.1 (A) 2025     Ear Cerumen Removal    Date/Time: 2025 10:37 AM    Performed by: Charlotte Kulkarni DO  Authorized by: Charlotte Kulkarni DO    Anesthesia:  Local Anesthetic: none  Ceruminolytics applied: Ceruminolytics applied prior to the procedure.  Location details: right ear  Patient tolerance: patient tolerated the procedure well with no immediate complications  Procedure type: irrigation   Sedation:  Patient sedated: no                                                                                                  Health  Risk Assessment    Smoking Status:  Social History     Tobacco Use   Smoking Status Former    Current packs/day: 0.00    Average packs/day: 1.5 packs/day for 40.0 years (60.0 ttl pk-yrs)    Types: Cigarettes    Start date: 1965    Quit date:     Years since quittin.3    Passive exposure: Past   Smokeless Tobacco Never   Tobacco Comments    no second hand smoke exposure     Alcohol Consumption:  Social History     Substance and Sexual Activity   Alcohol Use Never       Fall Risk Screen  STEADI Fall Risk Assessment was completed, and patient is at LOW risk for falls.Assessment completed on:2025    Depression Screening   Little interest or pleasure in doing things? Not at all   Feeling down, depressed, or hopeless? Not at all   PHQ-2 Total Score 0      Health Habits and Functional and Cognitive Screenin/14/2025     9:00 AM   Functional & Cognitive Status   Do you have difficulty preparing food and eating? No   Do you have difficulty bathing yourself, getting dressed or grooming yourself? No   Do you have difficulty using the toilet? No   Do you have " difficulty moving around from place to place? Yes   Do you have trouble with steps or getting out of a bed or a chair? No   Current Diet Well Balanced Diet   Dental Exam Up to date   Eye Exam Up to date   Exercise (times per week) 0 times per week   Current Exercises Include No Regular Exercise   Do you need help using the phone?  No   Are you deaf or do you have serious difficulty hearing?  No   Do you need help to go to places out of walking distance? Yes   Do you need help shopping? No   Do you need help preparing meals?  No   Do you need help with housework?  No   Do you need help with laundry? No   Do you need help taking your medications? No   Do you need help managing money? No   Do you ever drive or ride in a car without wearing a seat belt? No   Have you felt unusual stress, anger or loneliness in the last month? No   Who do you live with? Spouse   If you need help, do you have trouble finding someone available to you? No   Have you been bothered in the last four weeks by sexual problems? No   Do you have difficulty concentrating, remembering or making decisions? Yes           Age-appropriate Screening Schedule:  Refer to the list below for future screening recommendations based on patient's age, sex and/or medical conditions. Orders for these recommended tests are listed in the plan section. The patient has been provided with a written plan.    Health Maintenance List  Health Maintenance   Topic Date Due    ZOSTER VACCINE (1 of 2) Never done    DIABETIC EYE EXAM  03/07/2025    COVID-19 Vaccine (1 - 2024-25 season) 04/28/2025 (Originally 9/1/2024)    INFLUENZA VACCINE  07/01/2025    HEMOGLOBIN A1C  09/17/2025    LIPID PANEL  10/11/2025    URINE MICROALBUMIN-CREATININE RATIO (uACR)  10/11/2025    COLORECTAL CANCER SCREENING  04/10/2026    ANNUAL WELLNESS VISIT  04/14/2026    TDAP/TD VACCINES (2 - Td or Tdap) 07/16/2029    HEPATITIS C SCREENING  Completed    Pneumococcal Vaccine 50+  Completed    AAA SCREEN  ONCE  Completed                                                                                                                                                CMS Preventative Services Quick Reference  Risk Factors Identified During Encounter  Fall Risk-High or Moderate: Discussed Fall Prevention in the home    The above risks/problems have been discussed with the patient.  Pertinent information has been shared with the patient in the After Visit Summary.  An After Visit Summary and PPPS were made available to the patient.    Follow Up:   Next Medicare Wellness visit to be scheduled in 1 year.          Additional E&M Note during same encounter follows:  Patient has multiple medical problems which are significant and separately identifiable that require additional work above and beyond the Medicare Wellness Visit.      Chief Complaint  Medicare Wellness-subsequent    Chaparro Weber is a 69 y.o. male who presents to [facility]     He is here today for the management of his chronic medical conditions. He has asthma, COPD, congestive heart failure, chronic back pain managed by pain management, CAD with pace maker, diabetes, hypertension, very low testosterone levels, hyperlipidemia, kidney stones.  He has a family history of coronary disease and colon cancer and his dad  at 74.  He is a previous smoker and smoked for approximately 12 years.  He denies alcohol use.     He is complaining of both his ears feeling stopped up and not being able to hear normally.      The patient has no complaints today and denies chest pain, shortness of breath, weakness, numbness, nausea, vomiting, diarrhea, dizziness or syncopal event.    Objective   Vital Signs:   Vitals:    25 0941   BP: 113/59   BP Location: Left arm   Patient Position: Sitting   Cuff Size: Adult   Pulse: 85   Resp: 18   Temp: 97.8 °F (36.6 °C)   TempSrc: Temporal   SpO2: 91%   Weight: 121 kg (266 lb 12.8 oz)   Height: 188  "cm (74.02\")   PainSc: 0-No pain       Wt Readings from Last 3 Encounters:   04/14/25 121 kg (266 lb 12.8 oz)   04/10/25 116 kg (256 lb 13.4 oz)   03/17/25 120 kg (264 lb 6.4 oz)     BP Readings from Last 3 Encounters:   04/14/25 113/59   04/10/25 114/69   03/17/25 111/67       Physical Exam  Vitals reviewed.   Constitutional:       Appearance: He is well-developed. He is obese.   HENT:      Head: Normocephalic and atraumatic.      Right Ear: External ear normal. There is impacted cerumen.      Left Ear: External ear normal. There is impacted cerumen.      Mouth/Throat:      Pharynx: No oropharyngeal exudate.   Eyes:      Conjunctiva/sclera: Conjunctivae normal.      Pupils: Pupils are equal, round, and reactive to light.   Neck:      Vascular: No carotid bruit.   Cardiovascular:      Rate and Rhythm: Normal rate and regular rhythm.      Heart sounds: No murmur heard.     No friction rub. No gallop.   Pulmonary:      Effort: Pulmonary effort is normal.      Breath sounds: Normal breath sounds. No wheezing or rhonchi.   Abdominal:      General: There is no distension.   Skin:     General: Skin is warm and dry.   Neurological:      Mental Status: He is alert and oriented to person, place, and time.      Cranial Nerves: No cranial nerve deficit.      Motor: No weakness.   Psychiatric:         Mood and Affect: Mood and affect normal.         Behavior: Behavior normal.         Thought Content: Thought content normal.         Judgment: Judgment normal.         Physical Exam      The following data was reviewed by Charlotte Kulkarni DO on 04/14/2025  CMP   CMP          10/11/2024    09:17 11/22/2024    11:16 4/3/2025    09:09   CMP   Glucose 123  119     BUN 20  23     Creatinine 1.18  1.07  1.20    EGFR 67.6  76.1  66.3    Sodium 142  141     Potassium 4.7  4.5     Chloride 104  102     Calcium 9.5  9.0     Total Protein 7.1  7.1     Albumin 4.4  4.4     Globulin 2.7  2.7     Total Bilirubin 0.4  0.5     Alkaline Phosphatase 46 "  49     AST (SGOT) 39  43     ALT (SGPT) 28  31     Albumin/Globulin Ratio 1.6  1.6     BUN/Creatinine Ratio 16.9  21.5     Anion Gap 7.2  11.1       CBC   CBC          10/11/2024    09:17 11/13/2024    09:23 11/21/2024    13:53   CBC   WBC 3.31  2.59  2.79    RBC 5.21  4.94  5.01    Hemoglobin 14.7  14.4  14.4    Hematocrit 46.9  43.9  45.8    MCV 90.0  88.9  91.4    MCH 28.2  29.1  28.7    MCHC 31.3  32.8  31.4    RDW 13.6  13.6  13.9    Platelets 88  66  76      LIPID   Lipid Panel          10/11/2024    09:17   Lipid Panel   Total Cholesterol 142    Triglycerides 195    HDL Cholesterol 36    VLDL Cholesterol 33    LDL Cholesterol  73    LDL/HDL Ratio 1.86      TSH   TSH          10/11/2024    09:17   TSH   TSH 3.320        Results          Assessment & Plan   Diagnoses and all orders for this visit:    1. Medicare annual wellness visit, subsequent (Primary)    2. Bilateral impacted cerumen  Assessment & Plan:  He was educate about the risks and benefits of cerumen removal. Debrox drops were placed in b/l ear canals and allowed to sit for 15 minutes. Then warm water was used to flush b/l ear canal. Cerumen was successfully removed and the patient tolerated the procedure without issue.       3. Class 1 obesity due to excess calories with serious comorbidity and body mass index (BMI) of 34.0 to 34.9 in adult  Assessment & Plan:  Patient's (Body mass index is 34.24 kg/m².) indicates that they are obese (BMI >30) with health conditions that include hypertension, coronary heart disease, diabetes mellitus, and dyslipidemias . Weight is unchanged. BMI  is above average; BMI management plan is completed. We discussed low calorie, low carb based diet program, portion control, and increasing exercise.       4. Essential hypertension  Assessment & Plan:  Hypertension is stable and controlled  Continue current treatment regimen.  Dietary sodium restriction.  Weight loss.  Blood pressure will be reassessed in 6  months.      5. Mixed hyperlipidemia  Assessment & Plan:  Lipid abnormalities are improving with treatment.  Crestor was added today to help lower his cholesterol.   Lipids will be reassessed in 6 months.    The 10-year ASCVD risk score (Ronak DELUNA, et al., 2019) is: 24.2%    Values used to calculate the score:      Age: 69 years      Sex: Male      Is Non- : No      Diabetic: Yes      Tobacco smoker: No      Systolic Blood Pressure: 113 mmHg      Is BP treated: Yes      HDL Cholesterol: 36 mg/dL      Total Cholesterol: 142 mg/dL        6. Type 2 diabetes mellitus with diabetic autonomic neuropathy, with long-term current use of insulin  Assessment & Plan:  Diabetes is stable.   Continue current treatment regimen.  Recommended an ADA diet.  Diabetes will be reassessed in 6 months      Other orders  -     Ear Cerumen Removal        Assessment & Plan                 FOLLOW UP  Return in about 6 months (around 10/14/2025).  Patient was given instructions and counseling regarding his condition or for health maintenance advice. Please see specific information pulled into the AVS if appropriate.     Patient or patient representative verbalized consent for the use of Ambient Listening during the visit with  Charlotte Kulkarni DO for chart documentation. 4/14/2025  10:44 EDT    Charlotte Kulkarni DO  04/14/25  10:42 EDT

## 2025-04-14 NOTE — ASSESSMENT & PLAN NOTE
He was educate about the risks and benefits of cerumen removal. Debrox drops were placed in b/l ear canals and allowed to sit for 15 minutes. Then warm water was used to flush b/l ear canal. Cerumen was successfully removed and the patient tolerated the procedure without issue.

## 2025-05-02 ENCOUNTER — TELEPHONE (OUTPATIENT)
Dept: ONCOLOGY | Facility: HOSPITAL | Age: 70
End: 2025-05-02
Payer: MEDICARE

## 2025-05-02 NOTE — TELEPHONE ENCOUNTER
Caller: NELLI FARIA    Relationship to patient: Emergency Contact    Best call back number: [phone]    Chief complaint: SCHEDULING     Type of visit: FOLLOW UP    Requested date: NEXT AVLIABLE AFTER NEXT WEEK, NEEDS WEDNESDAY, THURSDAY OR FRIDAY  MORNINGS    If rescheduling, when is the original appointment: 5-6

## 2025-05-08 RX ORDER — ROSUVASTATIN CALCIUM 10 MG/1
10 TABLET, COATED ORAL DAILY
Qty: 90 TABLET | Refills: 2 | OUTPATIENT
Start: 2025-05-08

## 2025-05-27 RX ORDER — ESCITALOPRAM OXALATE 20 MG/1
20 TABLET ORAL DAILY
Qty: 30 TABLET | Refills: 2 | Status: SHIPPED | OUTPATIENT
Start: 2025-05-27

## 2025-05-28 ENCOUNTER — TELEPHONE (OUTPATIENT)
Dept: ONCOLOGY | Facility: HOSPITAL | Age: 70
End: 2025-05-28
Payer: MEDICARE

## 2025-05-29 ENCOUNTER — LAB (OUTPATIENT)
Dept: LAB | Facility: HOSPITAL | Age: 70
End: 2025-05-29
Payer: MEDICARE

## 2025-05-29 DIAGNOSIS — K76.0 FATTY LIVER: ICD-10-CM

## 2025-05-29 DIAGNOSIS — R16.1 SPLENOMEGALY: ICD-10-CM

## 2025-05-29 DIAGNOSIS — R16.1 SPLENOMEGALY, NOT ELSEWHERE CLASSIFIED: ICD-10-CM

## 2025-05-29 DIAGNOSIS — K76.0 HEPATIC STEATOSIS: ICD-10-CM

## 2025-05-29 DIAGNOSIS — D69.6 THROMBOCYTOPENIA: ICD-10-CM

## 2025-05-29 DIAGNOSIS — D72.819 LEUKOPENIA, UNSPECIFIED TYPE: ICD-10-CM

## 2025-05-29 DIAGNOSIS — Z80.0 FAMILY HISTORY OF COLON CANCER: ICD-10-CM

## 2025-05-29 LAB
BASOPHILS # BLD AUTO: 0.02 10*3/MM3 (ref 0–0.2)
BASOPHILS NFR BLD AUTO: 0.9 % (ref 0–1.5)
DEPRECATED RDW RBC AUTO: 49.7 FL (ref 37–54)
EOSINOPHIL # BLD AUTO: 0.03 10*3/MM3 (ref 0–0.4)
EOSINOPHIL NFR BLD AUTO: 1.3 % (ref 0.3–6.2)
ERYTHROCYTE [DISTWIDTH] IN BLOOD BY AUTOMATED COUNT: 14.4 % (ref 12.3–15.4)
HCT VFR BLD AUTO: 44.3 % (ref 37.5–51)
HGB BLD-MCNC: 13.6 G/DL (ref 13–17.7)
IMM GRANULOCYTES # BLD AUTO: 0.01 10*3/MM3 (ref 0–0.05)
IMM GRANULOCYTES NFR BLD AUTO: 0.4 % (ref 0–0.5)
LDH SERPL-CCNC: 192 U/L (ref 135–225)
LYMPHOCYTES # BLD AUTO: 0.84 10*3/MM3 (ref 0.7–3.1)
LYMPHOCYTES NFR BLD AUTO: 36.7 % (ref 19.6–45.3)
MCH RBC QN AUTO: 29 PG (ref 26.6–33)
MCHC RBC AUTO-ENTMCNC: 30.7 G/DL (ref 31.5–35.7)
MCV RBC AUTO: 94.5 FL (ref 79–97)
MONOCYTES # BLD AUTO: 0.3 10*3/MM3 (ref 0.1–0.9)
MONOCYTES NFR BLD AUTO: 13.1 % (ref 5–12)
NEUTROPHILS NFR BLD AUTO: 1.09 10*3/MM3 (ref 1.7–7)
NEUTROPHILS NFR BLD AUTO: 47.6 % (ref 42.7–76)
NRBC BLD AUTO-RTO: 0 /100 WBC (ref 0–0.2)
PLATELET # BLD AUTO: 64 10*3/MM3 (ref 140–450)
PMV BLD AUTO: 11.6 FL (ref 6–12)
RBC # BLD AUTO: 4.69 10*6/MM3 (ref 4.14–5.8)
WBC NRBC COR # BLD AUTO: 2.29 10*3/MM3 (ref 3.4–10.8)

## 2025-05-29 PROCEDURE — 85025 COMPLETE CBC W/AUTO DIFF WBC: CPT

## 2025-05-29 PROCEDURE — 36415 COLL VENOUS BLD VENIPUNCTURE: CPT

## 2025-05-29 PROCEDURE — 83615 LACTATE (LD) (LDH) ENZYME: CPT

## 2025-06-02 ENCOUNTER — OFFICE VISIT (OUTPATIENT)
Dept: ONCOLOGY | Facility: HOSPITAL | Age: 70
End: 2025-06-02
Payer: MEDICARE

## 2025-06-02 VITALS
TEMPERATURE: 97 F | RESPIRATION RATE: 16 BRPM | DIASTOLIC BLOOD PRESSURE: 70 MMHG | WEIGHT: 263.01 LBS | HEIGHT: 74 IN | OXYGEN SATURATION: 93 % | SYSTOLIC BLOOD PRESSURE: 109 MMHG | BODY MASS INDEX: 33.75 KG/M2 | HEART RATE: 89 BPM

## 2025-06-02 DIAGNOSIS — R16.1 SPLENOMEGALY: ICD-10-CM

## 2025-06-02 DIAGNOSIS — K76.0 FATTY LIVER: ICD-10-CM

## 2025-06-02 DIAGNOSIS — D72.819 LEUKOPENIA, UNSPECIFIED TYPE: Primary | ICD-10-CM

## 2025-06-02 DIAGNOSIS — D69.6 THROMBOCYTOPENIA: ICD-10-CM

## 2025-06-02 DIAGNOSIS — K25.9 GASTRIC ULCER WITHOUT HEMORRHAGE OR PERFORATION, UNSPECIFIED CHRONICITY: ICD-10-CM

## 2025-06-02 PROCEDURE — 3078F DIAST BP <80 MM HG: CPT | Performed by: INTERNAL MEDICINE

## 2025-06-02 PROCEDURE — G2211 COMPLEX E/M VISIT ADD ON: HCPCS | Performed by: INTERNAL MEDICINE

## 2025-06-02 PROCEDURE — G0463 HOSPITAL OUTPT CLINIC VISIT: HCPCS | Performed by: INTERNAL MEDICINE

## 2025-06-02 PROCEDURE — 1125F AMNT PAIN NOTED PAIN PRSNT: CPT | Performed by: INTERNAL MEDICINE

## 2025-06-02 PROCEDURE — 99214 OFFICE O/P EST MOD 30 MIN: CPT | Performed by: INTERNAL MEDICINE

## 2025-06-02 PROCEDURE — 3074F SYST BP LT 130 MM HG: CPT | Performed by: INTERNAL MEDICINE

## 2025-06-02 RX ORDER — OXYCODONE AND ACETAMINOPHEN 7.5; 325 MG/1; MG/1
TABLET ORAL
COMMUNITY
Start: 2025-05-28

## 2025-06-02 NOTE — PROGRESS NOTES
"Chief Complaint/Reason for Referral:  Neutropenia    Charlotte Kulkarni, Charlotte Huddleston,     Records Obtained:  Records of the patients history including those obtained from  [facility] and patient information were reviewed and summarized in detail.    Subjective    History of Present Illness  Mr. Chaparro Weber is a very pleasant 69 year old  male who presents to hematology for thrombocytopenia and leukopenia. His PMH includes: HLD, type 2 DM, asthma, arthritis, COPD, non-ischemic cardiomyopathy, CHF, left BBB, kidney stone, GERD, Hypertension and fatty liver. Fatigue still present  Reports weak legs, states they \"bow\" when walking. Present despite prior PT.  Reports he gets short of breath on exertion easily. Has ongoing back pain that limits his activity along with the fatigue.  Not as active as he was before the surgery in June 2023. No frequent infections.  He does report easy bruising.  Reports prolonged bleeding when cut.  Following with GI.  They did scopes back in April.  EGD did show gastric ulcer.  Colonoscopy showed several polyps that were benign.       Hematology History  Lab work shows has had twice lab work that has low WBC count down to 2.59 on 11/13/24 and again on 10/11/2024 and was 3.31. Absolute neutrophil count has been trending down since October of 2023. Most recent ANC on 11/13/2024 was 1150. Normal WBC and differential in 6/30/2023.  Lab work shows thrombocytopenia in the range of 66 to 138  since September of 2020. Has been trending downward recently in the 60-80's range. Reports occasionally when he bumps himself, his skin will start bleeding. Had prior anemia with hemoglobin down to 8.2 to 12.8 since June of 2023. Recent lab work showed normal hemoglobin of 14.7. MCV is normal at 80.     6/2023: CT abdomen/pelvis showed fatty liver and an enlarged spleen. He has not had any recent imaging. FINDINGS: Limited images of the lower chest demonstrate tiny effusions and basilar atelectasis. " The heart is normal in size. A pacemaker defibrillator is in place. There is hepatic steatosis. There has been a prior cholecystectomy. The pancreas and adrenal glands are normal. There is mild splenomegaly. There is a 7 mm nonobstructing calculus within the lower pole of the left kidney with additional bilateral punctate nonobstructing calculi. The bladder is normal. There is diverticulosis of the descending and sigmoid colon. There is no diverticulitis. The remainder of the colon is normal. The stomach and small bowel are unremarkable. There is no obstruction. No free intraperitoneal gas or fluid is present. There is no lymphadenopathy. There is trace atherosclerotic calcification within the distal aorta and iliac arteries. Stranding is noted within the subcutaneous tissues of the low anterior chest and upper abdomen.     11/21/24: WBC 2.79, hgb 14.4, plt 76, MCV 91.4, ANC 1.4, Ferritin 101, iron sat 17, B12 1604, folate >20, smear with absolute neutrophilia but normal morphology of all cell lines, flow cytometry was normal    5/29/24: Plt 64K, WBC 2.29, ANC 1.09, hgb 13.6        Oncology/Hematology History    No history exists.       Review of Systems   Constitutional:  Positive for fatigue. Negative for appetite change, diaphoresis, fever, unexpected weight gain and unexpected weight loss.   HENT: Negative.  Negative for hearing loss, mouth sores, sore throat, swollen glands, trouble swallowing and voice change.    Eyes: Negative.  Negative for blurred vision.   Respiratory: Negative.  Negative for cough, shortness of breath and wheezing.    Cardiovascular: Negative.  Negative for chest pain and palpitations.   Gastrointestinal: Negative.  Negative for abdominal pain, blood in stool, constipation, diarrhea, nausea and vomiting.   Endocrine: Negative.  Negative for cold intolerance and heat intolerance.   Genitourinary: Negative.  Negative for difficulty urinating, dysuria, frequency, hematuria and urinary  incontinence.   Musculoskeletal:  Positive for back pain. Negative for arthralgias and myalgias.   Skin:  Negative for rash, skin lesions and wound.   Allergic/Immunologic: Negative.    Neurological: Negative.  Negative for dizziness, seizures, weakness, numbness and headache.   Hematological:  Bruises/bleeds easily.   Psychiatric/Behavioral: Negative.  Negative for behavioral problems and depressed mood. The patient is not nervous/anxious.    All other systems reviewed and are negative.      Current Outpatient Medications on File Prior to Visit   Medication Sig Dispense Refill    oxyCODONE-acetaminophen (PERCOCET) 7.5-325 MG per tablet       aspirin  MG tablet Take 1 tablet by mouth Daily.      B Complex Vitamins (vitamin b complex) capsule capsule Take  by mouth Daily.      Biotin 35538 MCG tablet Take  by mouth.      celecoxib (CeleBREX) 100 MG capsule TAKE ONE CAPSULE BY MOUTH TWICE A DAY AS DIRECTED FOR 30 DAYS.      cholecalciferol (VITAMIN D3) 25 MCG (1000 UT) tablet Take 2 tablets by mouth Daily.      Continuous Glucose Sensor (FreeStyle Patel 3 Sensor) [facility] Use 1 each Every 14 (Fourteen) Days. 6 each 1    cyclobenzaprine (FLEXERIL) 5 MG tablet Take 1-2 tablets prn tid for muscle spasm 90 tablet 1    Dulaglutide (Trulicity) 4.5 MG/0.5ML solution auto-injector Inject 4.5 mg under the skin into the appropriate area as directed 1 (One) Time Per Week. 6 mL 3    empagliflozin (Jardiance) 25 MG tablet tablet       escitalopram (LEXAPRO) 20 MG tablet TAKE ONE TABLET BY MOUTH ONCE DAILY 30 tablet 2    fenofibrate (TRICOR) 145 MG tablet TAKE ONE TABLET BY MOUTH ONCE DAILY 90 tablet 2    glucose blood test strip Use as instructed 100 each 2    glucose blood test strip OneTouch Ultra Test strips      ibuprofen (ADVIL,MOTRIN) 200 MG tablet Take 2 tablets by mouth 2 (Two) Times a Day.      Insulin Glargine (BASAGLAR KWIKPEN) 100 UNIT/ML injection pen Inject 90 Units under the skin into the appropriate area as  directed Daily for 180 days. 81 mL 1    Insulin Pen Needle (Comfort EZ Pen Needles) 32G X 5 MM misc USE 1 EACH 5 (FIVE) TIMES A DAY. 450 each 1    Lyumjev KwikPen 200 UNIT/ML solution pen-injector Inject 40 Units under the skin into the appropriate area as directed 3 (Three) Times a Day Before Meals. 72 mL 4    metoprolol succinate XL (TOPROL-XL) 25 MG 24 hr tablet Take 1 tablet by mouth Daily.      metoprolol tartrate (LOPRESSOR) 50 MG tablet Take one tab (50mg) twice a day the day prior to heart CT and on the day of CT, take two tabs (100mg) 2 hours prior to CT scan appointment.      multivitamin with minerals (PRESERVISION AREDS PO) Take 1 tablet by mouth Daily.      NON FORMULARY Move free joint health      Omega-3 Fatty Acids (fish oil) 1200 MG capsule capsule Take 1 capsule by mouth Daily.      oxyCODONE-acetaminophen (PERCOCET)  MG per tablet Take 1 tablet by mouth Every 4 (Four) Hours As Needed.      pantoprazole (PROTONIX) 40 MG EC tablet Take 1 tablet by mouth Daily. 90 tablet 3    pregabalin (LYRICA) 300 MG capsule Take 1 capsule by mouth Daily.      psyllium (Metamucil) 0.52 g capsule       QUERCETIN PO Take 1,000 mg by mouth Daily.      rosuvastatin (CRESTOR) 10 MG tablet       tamsulosin (FLOMAX) 0.4 MG capsule 24 hr capsule Take 2 capsules by mouth Daily. 180 capsule 4    vitamin B-12 (CYANOCOBALAMIN) 1000 MCG tablet Take 1,200 mcg by mouth Daily.      vitamin E 100 UNIT capsule Take 1 capsule by mouth Daily.      Zinc 50 MG capsule Take  by mouth.       No current facility-administered medications on file prior to visit.       Allergies   Allergen Reactions    Codeine Nausea And Vomiting and Nausea Only     Past Medical History:   Diagnosis Date    Anxiety     Arthritis     Asthma     - PT DENIED ASTHMA- NO INHALERS    Bradycardia     DEFBIRLLATOR/ PACEMAKER- MEDITRONIC- SEE'S DR. HAMEED DENIED CP/SOB    Chest pain     NO RECENT CP    CHF (congestive heart failure)     ASYMPTOMATIC    Colon  polyp     Condition not found     Hernia-Unspecified    Coronary artery disease     Deafness     Diabetes     BG RUNS AROUND 250-300 IN AM    Fatty liver     HBP (high blood pressure)     Heart disease     High cholesterol     Kidney stones     Lung disease     Night sweats     Ringing in ear     Sleep apnea     Type 2 diabetes mellitus with autonomic neuropathy 07/16/2021     Past Surgical History:   Procedure Laterality Date    APPENDECTOMY      BACK SURGERY  2000,2010    lumbar with hardware    CARDIAC DEFIBRILLATOR PLACEMENT Right 2024    MEDITRONIC    CHOLECYSTECTOMY      COLONOSCOPY N/A 06/22/2022    Procedure: COLONOSCOPY;  Surgeon: Nicholas Townsend MD;  Location: [facility] ENDOSCOPY;  Service: General;  Laterality: N/A;  DIVERTICULOSIS    COLONOSCOPY N/A 4/10/2025    Procedure: COLONOSCOPY with hot snare polypectomies;  Surgeon: Nick Ballard MD;  Location: [facility] ENDOSCOPY;  Service: Gastroenterology;  Laterality: N/A;  colon polyps, diverticulosis    CYSTOSCOPY W/ STONE MANIPULATION Right 01/05/2023    Procedure: CYSTOSCOPY KIDNEY STONE MANIPULATION/EXTRACTION;  Surgeon: Ann Mon MD;  Location: [facility] MAIN OR;  Service: Urology;  Laterality: Right;    ENDOSCOPY N/A 4/10/2025    Procedure: ESOPHAGOGASTRODUODENOSCOPY with biopsies;  Surgeon: Nick Ballard MD;  Location: [facility] ENDOSCOPY;  Service: Gastroenterology;  Laterality: N/A;  gastric antrum ulcers, gastritis    KIDNEY STONE SURGERY      NECK SURGERY  2005    removal of bone spurs    [facility]      [facility]    URETEROSCOPY LASER LITHOTRIPSY WITH STENT INSERTION Left 01/05/2023    Procedure: LEFT URETEROSCOPY LASER LITHOTRIPSY WITH STENT INSERTION AND RETROGRADE; RIGHT URETEROSCOPY WITH STENT INSERTION AND RETROGRADE;  Surgeon: Ann Mon MD;  Location: [facility] MAIN OR;  Service: Urology;  Laterality: Left;     Social History     Socioeconomic History    Marital status:      Number of children: 2   Tobacco Use    Smoking status: Former     Current packs/day: 0.00     Average packs/day: 1.5 packs/day for 40.0 years (60.0 ttl pk-yrs)     Types: Cigarettes     Start date: 1965     Quit date:      Years since quittin.4     Passive exposure: Past    Smokeless tobacco: Never    Tobacco comments:     no second hand smoke exposure   Vaping Use    Vaping status: Never Used   Substance and Sexual Activity    Alcohol use: Never    Drug use: Never    Sexual activity: Defer     Family History   Problem Relation Age of Onset    Other Mother 60        Renal Cancer    Prostate cancer Father     Colon cancer Father 70    Malig Hyperthermia Neg Hx      Immunization History   Administered Date(s) Administered    Flu Vaccine Quad PF >36MO 2017, 2020    Fluzone (or Fluarix & Flulaval for VFC) >6mos 2017, 2020    Fluzone High-Dose 65+yrs 10/02/2023    Influenza Injectable Mdck Pf Quad 10/12/2017    Influenza, Unspecified 10/12/2017, 2020    Pneumococcal Conjugate 20-Valent (PCV20) 10/07/2024    Tdap 2019       Tobacco Use: Medium Risk (2025)    Patient History     Smoking Tobacco Use: Former     Smokeless Tobacco Use: Never     Passive Exposure: Past       Objective     Physical Exam  Constitutional:       Appearance: Normal appearance. He is obese.   HENT:      Head: Normocephalic and atraumatic.      Nose: Nose normal.   Eyes:      Conjunctiva/sclera: Conjunctivae normal.   Pulmonary:      Effort: Pulmonary effort is normal.   Neurological:      General: No focal deficit present.      Mental Status: He is alert. Mental status is at baseline.   Psychiatric:         Mood and Affect: Mood normal.         Behavior: Behavior normal.         Thought Content: Thought content normal.         Vitals:    25 1127   BP: 109/70   Pulse: 89   Resp: 16   Temp: 97 °F (36.1 °C)   TempSrc: Temporal   SpO2: 93%   Weight: 119 kg (263 lb 0.1 oz)   Height: 188 cm  "(74.02\")   PainSc: 6    PainLoc: Back           Wt Readings from Last 3 Encounters:   04/14/25 121 kg (266 lb 12.8 oz)   04/10/25 116 kg (256 lb 13.4 oz)   03/17/25 120 kg (264 lb 6.4 oz)                 ECOG: (0) Fully Active - Able to Carry On All Pre-disease Performance Without Restriction  Fall Risk Assessment was completed, and patient is at low risk for falls.  PHQ-9 Total Score:         The patient is  experiencing fatigue. Fatigue score: 4    PT/OT Functional Screening: PT fx screen : No needs identified  Speech Functional Screening: Speech fx screen : No needs identified  Rehab to be ordered: Rehab to be ordered : No needs identified        Result Review :  The following data was reviewed by: Lawson Saini MD on 06/02/25:  Lab Results   Component Value Date    HGB 13.6 05/29/2025    HCT 44.3 05/29/2025    MCV 94.5 05/29/2025    PLT 64 (L) 05/29/2025    WBC 2.29 (L) 05/29/2025    NEUTROABS 1.09 (L) 05/29/2025    LYMPHSABS 0.84 05/29/2025    MONOSABS 0.30 05/29/2025    EOSABS 0.03 05/29/2025    BASOSABS 0.02 05/29/2025     Lab Results   Component Value Date    GLUCOSE 119 (H) 11/22/2024    BUN 23 11/22/2024    CREATININE 1.20 04/03/2025     11/22/2024    K 4.5 11/22/2024     11/22/2024    CO2 27.9 11/22/2024    CALCIUM 9.0 11/22/2024    PROTEINTOT 7.1 11/22/2024    ALBUMIN 4.4 11/22/2024    BILITOT 0.5 11/22/2024    ALKPHOS 49 11/22/2024    AST 43 (H) 11/22/2024    ALT 31 11/22/2024     Lab Results   Component Value Date     05/29/2025    Ferritin 101.80 11/21/2024    Folate >20.00 11/21/2024     Lab Results   Component Value Date    IRON 85 11/21/2024    LABIRON 17 (L) 11/21/2024    TRANSFERRIN 335 11/21/2024    TIBC 499 11/21/2024     Lab Results   Component Value Date     05/29/2025    FERRITIN 101.80 11/21/2024    KCFQRPKF17 1,604 (H) 11/21/2024    FOLATE >20.00 11/21/2024     Lab Results   Component Value Date    PSA <0.014 10/11/2024     Labs personally reviewed. ANC " low. Plts low. Hgb normal.     Recent Endocrine note personally reviewed    GI note personally reviewed    Pathology report personally reviewed    EGD report personally reviewed    C-scope report personally reviewed         Assessment and Plan:  Diagnoses and all orders for this visit:    1. Leukopenia, unspecified type (Primary)  -     CT Guided Bone Marrow Biopsy And Aspiration; Future  -     Cancel: Bone Marrow Exam; Standing    2. Thrombocytopenia  -     CT Guided Bone Marrow Biopsy And Aspiration; Future  -     Cancel: Bone Marrow Exam; Standing    3. Fatty liver    4. Splenomegaly    5. Gastric ulcer without hemorrhage or perforation, unspecified chronicity        Neutropenia  Thrombocytopenia  ANC not less than 1K. Denies any fevers, chills, or weight loss. No frequent infections. Also, with longstanding chronic thrombocytopenia since at least September of 2020. Likely, the leukopenia and the thrombocytopenia is secondary to fatty liver and splenomegaly seen on prior CT abdomen in 2023.  He denies any acute GI blood loss, gums bleeding, nose bleeds.     Reassuringly he is not anemic. Smear with normal morphology. Flow cytometry also normal. B12, folate, iron studies normal.     As of 5/29/25, plts are downtrending. ANC downtrending as well.  Patient does have possible explanation with fatty liver and splenomegaly however concern for underlying bone marrow process such as MDS.  Bone marrow biopsy has not been done.  Discussed this with patient today.  Patient agreeable to getting bone marrow biopsy. Will order biopsy and f/u after.      Fatty liver  Splenomegaly  Has been referred to gastroenterology for evaluation for the fatty liver, splenomegaly to rule out any underlying liver cirrhosis. Not able to get MRI liver due to pacemaker. Weight loss has been recommended.     Gastric ulcer  Per EGD 4/2025. H pylor negative. PPI daily recommended.           Patient Follow Up: after bone marrow biopsy    Patient was  given instructions and counseling regarding his condition or for health maintenance advice. Please see specific information pulled into the AVS if appropriate.

## 2025-06-09 ENCOUNTER — HOSPITAL ENCOUNTER (OUTPATIENT)
Dept: CT IMAGING | Facility: HOSPITAL | Age: 70
Discharge: HOME | End: 2025-06-09
Payer: MEDICARE

## 2025-06-09 VITALS
SYSTOLIC BLOOD PRESSURE: 117 MMHG | DIASTOLIC BLOOD PRESSURE: 79 MMHG | HEART RATE: 87 BPM | RESPIRATION RATE: 18 BRPM | OXYGEN SATURATION: 94 %

## 2025-06-09 DIAGNOSIS — D72.819 LEUKOPENIA, UNSPECIFIED TYPE: ICD-10-CM

## 2025-06-09 DIAGNOSIS — D69.6 THROMBOCYTOPENIA: ICD-10-CM

## 2025-06-09 LAB
BASOPHILS # BLD AUTO: 0.01 10*3/MM3 (ref 0–0.2)
BASOPHILS # BLD MANUAL: 0.04 10*3/MM3 (ref 0–0.2)
BASOPHILS NFR BLD AUTO: 0.3 % (ref 0–1.5)
BASOPHILS NFR BLD MANUAL: 1 % (ref 0–1.5)
DEPRECATED RDW RBC AUTO: 47.6 FL (ref 37–54)
EOSINOPHIL # BLD AUTO: 0.03 10*3/MM3 (ref 0–0.4)
EOSINOPHIL NFR BLD AUTO: 0.8 % (ref 0.3–6.2)
ERYTHROCYTE [DISTWIDTH] IN BLOOD BY AUTOMATED COUNT: 14.3 % (ref 12.3–15.4)
HCT VFR BLD AUTO: 46.1 % (ref 37.5–51)
HGB BLD-MCNC: 15 G/DL (ref 13–17.7)
IMM GRANULOCYTES # BLD AUTO: 0.04 10*3/MM3 (ref 0–0.05)
IMM GRANULOCYTES NFR BLD AUTO: 1.1 % (ref 0–0.5)
LARGE PLATELETS: NORMAL
LYMPHOCYTES # BLD AUTO: 1.07 10*3/MM3 (ref 0.7–3.1)
LYMPHOCYTES # BLD MANUAL: 1.26 10*3/MM3 (ref 0.7–3.1)
LYMPHOCYTES NFR BLD AUTO: 28.8 % (ref 19.6–45.3)
LYMPHOCYTES NFR BLD MANUAL: 7 % (ref 5–12)
MCH RBC QN AUTO: 29.5 PG (ref 26.6–33)
MCHC RBC AUTO-ENTMCNC: 32.5 G/DL (ref 31.5–35.7)
MCV RBC AUTO: 90.6 FL (ref 79–97)
MONOCYTES # BLD AUTO: 0.44 10*3/MM3 (ref 0.1–0.9)
MONOCYTES # BLD: 0.26 10*3/MM3 (ref 0.1–0.9)
MONOCYTES NFR BLD AUTO: 11.9 % (ref 5–12)
NEUTROPHILS # BLD AUTO: 2.15 10*3/MM3 (ref 1.7–7)
NEUTROPHILS NFR BLD AUTO: 2.12 10*3/MM3 (ref 1.7–7)
NEUTROPHILS NFR BLD AUTO: 57.1 % (ref 42.7–76)
NEUTROPHILS NFR BLD MANUAL: 58 % (ref 42.7–76)
PLATELET # BLD AUTO: 66 10*3/MM3 (ref 140–450)
PMV BLD AUTO: 11.5 FL (ref 6–12)
RBC # BLD AUTO: 5.09 10*6/MM3 (ref 4.14–5.8)
RBC MORPH BLD: NORMAL
SMALL PLATELETS BLD QL SMEAR: NORMAL
VARIANT LYMPHS NFR BLD MANUAL: 34 % (ref 19.6–45.3)
WBC MORPH BLD: NORMAL
WBC NRBC COR # BLD AUTO: 3.71 10*3/MM3 (ref 3.4–10.8)

## 2025-06-09 PROCEDURE — 85025 COMPLETE CBC W/AUTO DIFF WBC: CPT | Performed by: RADIOLOGY

## 2025-06-09 PROCEDURE — 25010000002 MIDAZOLAM PER 1MG: Performed by: RADIOLOGY

## 2025-06-09 PROCEDURE — 25010000002 FENTANYL CITRATE (PF) 50 MCG/ML SOLUTION: Performed by: RADIOLOGY

## 2025-06-09 PROCEDURE — 85007 BL SMEAR W/DIFF WBC COUNT: CPT | Performed by: RADIOLOGY

## 2025-06-09 PROCEDURE — 77012 CT SCAN FOR NEEDLE BIOPSY: CPT

## 2025-06-09 RX ORDER — FENTANYL CITRATE 50 UG/ML
INJECTION, SOLUTION INTRAMUSCULAR; INTRAVENOUS AS NEEDED
Status: COMPLETED | OUTPATIENT
Start: 2025-06-09 | End: 2025-06-09

## 2025-06-09 RX ORDER — MIDAZOLAM HYDROCHLORIDE 2 MG/2ML
INJECTION, SOLUTION INTRAMUSCULAR; INTRAVENOUS AS NEEDED
Status: COMPLETED | OUTPATIENT
Start: 2025-06-09 | End: 2025-06-09

## 2025-06-09 RX ORDER — LIDOCAINE HYDROCHLORIDE 20 MG/ML
INJECTION, SOLUTION INFILTRATION; PERINEURAL
Status: DISPENSED
Start: 2025-06-09 | End: 2025-06-09

## 2025-06-09 RX ADMIN — FENTANYL CITRATE 50 MCG: 50 INJECTION, SOLUTION INTRAMUSCULAR; INTRAVENOUS at 12:14

## 2025-06-09 RX ADMIN — MIDAZOLAM HYDROCHLORIDE 1 MG: 1 INJECTION, SOLUTION INTRAMUSCULAR; INTRAVENOUS at 12:11

## 2025-06-09 RX ADMIN — MIDAZOLAM HYDROCHLORIDE 1 MG: 1 INJECTION, SOLUTION INTRAMUSCULAR; INTRAVENOUS at 12:22

## 2025-06-09 NOTE — H&P
[facility]   Interventional Radiology H&P    Patient Name: Chaparro Weber  : 1955  MRN: [id number]  Primary Care Physician:  Charlotte Kulkarni DO  Referring Physician: Lawson Saini, *  Date of admission: 2025    Subjective   Subjective     HPI:  Chaparro Weber is a 69 y.o. male with leukopenia    Review of Systems:   Constitutional no fever,  no weight loss       Otolaryngeal no difficulty swallowing   Cardiovascular no chest pain   Pulmonary no cough, no sputum production   Gastrointestinal no constipation, no diarrhea                         Personal History       Past Medical/Surgical History:   Past Medical History:   Diagnosis Date    Anxiety     Arthritis     Bradycardia     DEFBIRLLATOR/ PACEMAKER- MEDITRONIC- SEE'S DR. HAMEED DENIED CP/SOB    Chest pain     NO RECENT CP    CHF (congestive heart failure)     ASYMPTOMATIC    Colon polyp     Condition not found     Hernia-Unspecified    Coronary artery disease     Deafness     Diabetes     BG RUNS AROUND 250-300 IN AM    Fatty liver     HBP (high blood pressure)     Heart disease     High cholesterol     Kidney stones     Lung disease     Night sweats     Ringing in ear     Sleep apnea     Type 2 diabetes mellitus with autonomic neuropathy 2021     Past Surgical History:   Procedure Laterality Date    APPENDECTOMY      BACK SURGERY  ,    lumbar with hardware    CARDIAC DEFIBRILLATOR PLACEMENT Right     MEDITRONIC    CHOLECYSTECTOMY      COLONOSCOPY N/A 2022    Procedure: COLONOSCOPY;  Surgeon: Nicholas Townsend MD;  Location: [facility] ENDOSCOPY;  Service: General;  Laterality: N/A;  DIVERTICULOSIS    COLONOSCOPY N/A 4/10/2025    Procedure: COLONOSCOPY with hot snare polypectomies;  Surgeon: Nick Ballard MD;  Location: [facility] ENDOSCOPY;  Service: Gastroenterology;  Laterality: N/A;  colon polyps, diverticulosis    CYSTOSCOPY W/ STONE MANIPULATION Right 2023    Procedure: CYSTOSCOPY KIDNEY STONE  MANIPULATION/EXTRACTION;  Surgeon: Ann Mon MD;  Location: [facility] MAIN OR;  Service: Urology;  Laterality: Right;    ENDOSCOPY N/A 4/10/2025    Procedure: ESOPHAGOGASTRODUODENOSCOPY with biopsies;  Surgeon: Nick Ballard MD;  Location: [facility] ENDOSCOPY;  Service: Gastroenterology;  Laterality: N/A;  gastric antrum ulcers, gastritis    KIDNEY STONE SURGERY      NECK SURGERY  2005    removal of bone spurs    PACEMAKER IMPLANTATION      10 YEARS AGO PLACED- MEDITRONIC    URETEROSCOPY LASER LITHOTRIPSY WITH STENT INSERTION Left 01/05/2023    Procedure: LEFT URETEROSCOPY LASER LITHOTRIPSY WITH STENT INSERTION AND RETROGRADE; RIGHT URETEROSCOPY WITH STENT INSERTION AND RETROGRADE;  Surgeon: Ann Mon MD;  Location: [facility] MAIN OR;  Service: Urology;  Laterality: Left;       Social History:  reports that he quit smoking about 49 years ago. His smoking use included cigarettes. He started smoking about 60 years ago. He has a 60 pack-year smoking history. He has been exposed to tobacco smoke. He has never used smokeless tobacco. He reports that he does not drink alcohol and does not use drugs.    Medications:  (Not in a hospital admission)    Current medications:    Current IV drips:  No current facility-administered medications for this encounter.      Allergies:  Allergies   Allergen Reactions    Codeine Nausea And Vomiting and Nausea Only       Objective    Objective     Vitals:   Heart Rate:  [82] 82  Resp:  [16] 16  BP: (136)/(84) 136/84      Physical Exam:   Constitutional: Awake, alert, No acute distress    Respiratory: Clear to auscultation bilaterally, nonlabored respirations    Cardiovascular: RRR, no murmurs, rubs, or gallops,     ASA SCALE ASSESSMENT:  3-Severe systemic disease that results in functional limitation    MALLAMPATI CLASSIFICATION:  2-Able to visualize the soft palate, fauces, uvula. The anterior & posterior tonsilar pillars are hidden by the tongue.       Result Review   "      Result Review:     No results found for: \"NA\"    No results found for: \"K\"    No results found for: \"CL\"    No results found for: \"PLASMABICARB\"    No results found for: \"BUN\"    No results found for: \"CREATININE\"    No results found for: \"CALCIUM\"        No components found for: \"GLUCOSE.*\"  Results from last 7 days   Lab Units 06/09/25  0853   WBC 10*3/mm3 3.71   HEMOGLOBIN g/dL 15.0   HEMATOCRIT % 46.1   PLATELETS 10*3/mm3 66*                Assessment / Plan     Assesment:   Leukopenia      Plan:   CT guided bone marrow aspiration    The risks and benefits of the procedure were discussed with the patient.    Electronically signed by Pola Voss MD, 06/09/25, 9:44 AM EDT.    "

## 2025-06-10 ENCOUNTER — TELEPHONE (OUTPATIENT)
Dept: GASTROENTEROLOGY | Facility: CLINIC | Age: 70
End: 2025-06-10
Payer: MEDICARE

## 2025-06-10 LAB — Lab: NORMAL

## 2025-06-10 NOTE — PROGRESS NOTES
Radiology Department Procedure Follow-up Call Note    Chaparro Weber  1955  06/10/25    Chaparro Weber was contacted via telephone to follow-up after a procedure performed by the radiologist.     Pain Management  What is your pain level today? 0    Bleeding issues  Have you had any bleeding? no    Signs of infection  Have you had any signs of infection(including fever, chills, redness, drainage, rash)? no    Emergency Department Visit or Other Provider  Have returned to the ED or seen another provider related to this procedure? no    Satisfaction with Care  Are you satisfied with the care you received? Oh yes, definitely    Other Comments/Questions:     Additional or Follow up Instructions:  My nurse was very good, Ashtyn Montesinos      Electronically signed by Clementine Jurado RN, 06/10/25, 3:26 PM EDT.

## 2025-06-12 LAB
CYTO UR: NORMAL
DIFF PNL MAR: NORMAL
LAB AP ASPIRATE SMEAR: NORMAL
LAB AP CASE REPORT: NORMAL
LAB AP CLINICAL INFORMATION: NORMAL
LAB AP CLOT SECTION: NORMAL
LAB AP CORE BIOPSY: NORMAL
LAB AP DIAGNOSIS COMMENT: NORMAL
LAB AP SPECIAL STAINS: NORMAL
PATH REPORT.FINAL DX SPEC: NORMAL
PATH REPORT.GROSS SPEC: NORMAL

## 2025-06-16 LAB — CYTOGENETICS RESULT: NORMAL

## 2025-06-17 LAB — MDS TARGETGENE PANEL RESULT: NORMAL

## 2025-06-22 NOTE — PROGRESS NOTES
Chief Complaint  Diabetes (Med management, A1C, CGM Eval, discuss stopping long acting insulin)    Referred By: Charlotte Kulkarni DO    Subjective          Patient or patient representative verbalized consent for the use of Ambient Listening during the visit with  ANGLE Mcdonnell for chart documentation. 6/23/2025  11:26 EDT    Chaparro Weber presents to [facility] for diabetes medication management    History of Present Illness    Visit type:  follow-up  Diabetes type:  Type 2  Current diabetes status/concerns/issues:     History of Present Illness  The patient presents for evaluation of diabetes.    He is currently on a regimen of Basaglar, administered at 55 units in the morning and 50 units at night, along with Lyumjev insulin, which he adjusts to 25 to 35 units with most meals. Additionally, he takes Jardiance 25 mg once daily and Trulicity 4.5 mg once weekly. He reports satisfactory tolerance to these medications.     He has experienced some episodes of hypoglycemia, the cause of which remains unclear to him. During these episodes, he experiences visual disturbances. He recalls an incident on 06/21/2025 when his sensor alerted him to low blood sugar levels while he was fishing. He did not have any food with him at the time, so he waited to see if his blood sugar levels would rise naturally. They eventually increased to a range of 75 to 80, by which time he was able to consume some food. He typically keeps peanut butter crackers in his truck for such situations.     He has made dietary modifications, including increased vegetable intake and portion control, which have resulted in a weight loss of 9 pounds.      Current Diabetes symptoms:    Polyuria: No   Polydipsia: No   Polyphagia: No   Blurred vision: No   Excessive fatigue: No  Known Diabetes complications:  Neuropathy: Tingling; Location: Feet  Renal: Stage II mild (GFR = 60-89 mL/min) and Microalbuminuria -  NEGATIVE  Eyes: No current eye exam available in record; Location: N/A  Amputation/Wounds: None  GI: None  Cardiovascular: Hypertension, Hyperlipidemia, CHF and Cardiomyopathy  ED: None  Other: None  Hypoglycemia:  he reports a rare low  Hypoglycemia Symptoms:  visual disturbances  Current diabetes treatment:  Basaglar 55 units in the AM and 50 units in the PM and Lyumjev insulin before each meal taking 8, 10, or 12 units based on small, medium, or large amounts of carbohydrates plus adding correction for glucose levels greater than 150 mg/dL starting with 2 units and increasing by 1 unit for every 25 mg/dL thereafter.  He states he is taking 25-35 units of Lyumjev with each meal. Jardiance 25 mg once a day. Trulicity 4.5 mg once weekly.   Blood glucose device:  HRBoss CGM  Blood glucose monitoring frequency:  Continuous per CGM  Blood glucose range/average:  The 14-day sensor report shows an average glucose of 156 mg/dL, with 75% in target range ( mgdL), 24% in the high range (181-250 mg/dL), 1% in the very high range (>250 mg/dL), 0% in the low range (54-70 mg/dL) and 0% in the very low range (<54 mg/dL).   Glucose Source: Device Reviewed  Diet:  Limits high carb/sweet foods, Avoids sugary drinks  Activity/Exercise:  None    Past Medical History:   Diagnosis Date    Anxiety     Arthritis     Bradycardia     DEFBIRLLATOR/ PACEMAKER- MEDITRONIC- SEE'S DR. HAMEED DENIED CP/SOB    Chest pain     NO RECENT CP    CHF (congestive heart failure)     ASYMPTOMATIC    Colon polyp     Condition not found     Hernia-Unspecified    Coronary artery disease     Deafness     Diabetes     BG RUNS AROUND 250-300 IN AM    Fatty liver     HBP (high blood pressure)     Heart disease     High cholesterol     Kidney stones     Lung disease     Night sweats     Ringing in ear     Sleep apnea     Type 2 diabetes mellitus with autonomic neuropathy 07/16/2021     Past Surgical History:   Procedure Laterality Date    APPENDECTOMY       BACK SURGERY  ,    lumbar with hardware    CARDIAC DEFIBRILLATOR PLACEMENT Right     MEDITRONIC    CHOLECYSTECTOMY      COLONOSCOPY N/A 2022    Procedure: COLONOSCOPY;  Surgeon: Nicholas Townsend MD;  Location: [facility] ENDOSCOPY;  Service: General;  Laterality: N/A;  DIVERTICULOSIS    COLONOSCOPY N/A 4/10/2025    Procedure: COLONOSCOPY with hot snare polypectomies;  Surgeon: Nick Ballard MD;  Location: [facility] ENDOSCOPY;  Service: Gastroenterology;  Laterality: N/A;  colon polyps, diverticulosis    CYSTOSCOPY W/ STONE MANIPULATION Right 2023    Procedure: CYSTOSCOPY KIDNEY STONE MANIPULATION/EXTRACTION;  Surgeon: Ann Mon MD;  Location: [facility] MAIN OR;  Service: Urology;  Laterality: Right;    ENDOSCOPY N/A 4/10/2025    Procedure: ESOPHAGOGASTRODUODENOSCOPY with biopsies;  Surgeon: Nick Ballard MD;  Location: [facility] ENDOSCOPY;  Service: Gastroenterology;  Laterality: N/A;  gastric antrum ulcers, gastritis    KIDNEY STONE SURGERY      NECK SURGERY      removal of bone spurs    PACEMAKER IMPLANTATION      10 YEARS AGO PLACED- MEDITRONIC    URETEROSCOPY LASER LITHOTRIPSY WITH STENT INSERTION Left 2023    Procedure: LEFT URETEROSCOPY LASER LITHOTRIPSY WITH STENT INSERTION AND RETROGRADE; RIGHT URETEROSCOPY WITH STENT INSERTION AND RETROGRADE;  Surgeon: Ann Mon MD;  Location: [facility] MAIN OR;  Service: Urology;  Laterality: Left;     Family History   Problem Relation Age of Onset    Other Mother 60        Renal Cancer    Prostate cancer Father     Colon cancer Father 70    Malig Hyperthermia Neg Hx      Social History     Socioeconomic History    Marital status:     Number of children: 2   Tobacco Use    Smoking status: Former     Current packs/day: 0.00     Average packs/day: 1.5 packs/day for 40.0 years (60.0 ttl pk-yrs)     Types: Cigarettes     Start date: 1965     Quit date:      Years since quittin.5     Passive exposure:  Past    Smokeless tobacco: Never    Tobacco comments:     no second hand smoke exposure   Vaping Use    Vaping status: Never Used   Substance and Sexual Activity    Alcohol use: Never    Drug use: Never    Sexual activity: Defer     Allergies   Allergen Reactions    Codeine Nausea And Vomiting and Nausea Only       Current Outpatient Medications:     aspirin  MG tablet, Take 1 tablet by mouth Daily., Disp: , Rfl:     B Complex Vitamins (vitamin b complex) capsule capsule, Take  by mouth Daily., Disp: , Rfl:     Biotin 98166 MCG tablet, Take  by mouth., Disp: , Rfl:     celecoxib (CeleBREX) 100 MG capsule, TAKE ONE CAPSULE BY MOUTH TWICE A DAY AS DIRECTED FOR 30 DAYS., Disp: , Rfl:     cholecalciferol (VITAMIN D3) 25 MCG (1000 UT) tablet, Take 2 tablets by mouth Daily., Disp: , Rfl:     Continuous Glucose Sensor (FreeStyle Patel 3 Sensor) misc, Use 1 each Every 14 (Fourteen) Days., Disp: 6 each, Rfl: 1    cyclobenzaprine (FLEXERIL) 5 MG tablet, Take 1-2 tablets prn tid for muscle spasm, Disp: 90 tablet, Rfl: 1    Dulaglutide (Trulicity) 4.5 MG/0.5ML solution auto-injector, Inject 4.5 mg under the skin into the appropriate area as directed 1 (One) Time Per Week., Disp: 6 mL, Rfl: 3    empagliflozin (Jardiance) 25 MG tablet tablet, , Disp: , Rfl:     escitalopram (LEXAPRO) 20 MG tablet, TAKE ONE TABLET BY MOUTH ONCE DAILY, Disp: 30 tablet, Rfl: 2    fenofibrate (TRICOR) 145 MG tablet, TAKE ONE TABLET BY MOUTH ONCE DAILY, Disp: 90 tablet, Rfl: 2    glucose blood test strip, Use as instructed, Disp: 100 each, Rfl: 2    glucose blood test strip, OneTouch Ultra Test strips, Disp: , Rfl:     ibuprofen (ADVIL,MOTRIN) 200 MG tablet, Take 2 tablets by mouth 2 (Two) Times a Day., Disp: , Rfl:     Insulin Glargine (BASAGLAR KWIKPEN) 100 UNIT/ML injection pen, Inject 90 Units under the skin into the appropriate area as directed Daily for 180 days. (Patient taking differently: Inject 105 Units under the skin into the  appropriate area as directed Daily. 55 units in the AM and 50 units in the PM), Disp: 81 mL, Rfl: 1    Insulin Pen Needle (Comfort EZ Pen Needles) 32G X 5 MM misc, USE 1 EACH 5 (FIVE) TIMES A DAY., Disp: 450 each, Rfl: 1    Lyumjev KwikPen 200 UNIT/ML solution pen-injector, Inject 40 Units under the skin into the appropriate area as directed 3 (Three) Times a Day Before Meals., Disp: 72 mL, Rfl: 4    metoprolol succinate XL (TOPROL-XL) 25 MG 24 hr tablet, Take 1 tablet by mouth Daily., Disp: , Rfl:     multivitamin with minerals (PRESERVISION AREDS PO), Take 1 tablet by mouth Daily., Disp: , Rfl:     NON FORMULARY, Move free joint health, Disp: , Rfl:     Omega-3 Fatty Acids (fish oil) 1200 MG capsule capsule, Take 1 capsule by mouth Daily., Disp: , Rfl:     oxyCODONE-acetaminophen (PERCOCET)  MG per tablet, Take 1 tablet by mouth Every 4 (Four) Hours As Needed., Disp: , Rfl:     pantoprazole (PROTONIX) 40 MG EC tablet, Take 1 tablet by mouth Daily., Disp: 90 tablet, Rfl: 3    pregabalin (LYRICA) 300 MG capsule, Take 1 capsule by mouth Daily., Disp: , Rfl:     psyllium (Metamucil) 0.52 g capsule, , Disp: , Rfl:     QUERCETIN PO, Take 1,000 mg by mouth Daily., Disp: , Rfl:     rosuvastatin (CRESTOR) 10 MG tablet, , Disp: , Rfl:     spironolactone (ALDACTONE) 25 MG tablet, Take 1 tablet by mouth Daily., Disp: , Rfl:     tamsulosin (FLOMAX) 0.4 MG capsule 24 hr capsule, Take 2 capsules by mouth Daily., Disp: 180 capsule, Rfl: 4    vitamin B-12 (CYANOCOBALAMIN) 1000 MCG tablet, Take 1,200 mcg by mouth Daily., Disp: , Rfl:     vitamin E 100 UNIT capsule, Take 1 capsule by mouth Daily., Disp: , Rfl:     Zinc 50 MG capsule, Take  by mouth., Disp: , Rfl:     metoprolol tartrate (LOPRESSOR) 50 MG tablet, Take one tab (50mg) twice a day the day prior to heart CT and on the day of CT, take two tabs (100mg) 2 hours prior to CT scan appointment., Disp: , Rfl:     oxyCODONE-acetaminophen (PERCOCET) 7.5-325 MG per tablet, ,  "Disp: , Rfl:     Objective     Vitals:    06/23/25 1040   BP: 119/74   BP Location: Left arm   Patient Position: Sitting   Cuff Size: Adult   Pulse: 84   SpO2: 93%   Weight: 116 kg (255 lb)   Height: 188 cm (74.02\")     Body mass index is 32.72 kg/m².    Physical Exam  Constitutional:       Appearance: Normal appearance. He is obese.      Comments: Obesity (BMI 30 - 39.9) Pt Current BMI = 32.72    HENT:      Head: Normocephalic and atraumatic.      Right Ear: External ear normal.      Left Ear: External ear normal.      Nose: Nose normal.   Eyes:      Extraocular Movements: Extraocular movements intact.      Conjunctiva/sclera: Conjunctivae normal.   Pulmonary:      Effort: Pulmonary effort is normal.   Musculoskeletal:         General: Normal range of motion.      Cervical back: Normal range of motion.   Skin:     General: Skin is warm and dry.   Neurological:      General: No focal deficit present.      Mental Status: He is alert and oriented to person, place, and time. Mental status is at baseline.   Psychiatric:         Mood and Affect: Mood normal.         Behavior: Behavior normal.         Thought Content: Thought content normal.         Judgment: Judgment normal.             Result Review :   The following data was reviewed by: ANGLE Mcdonnell on 06/23/2025:    Most Recent A1C          6/23/2025    11:01   HGBA1C Most Recent   Hemoglobin A1C 6.8        A1C Last 3 Results          12/16/2024    11:18 3/17/2025    11:20 6/23/2025    11:01   HGBA1C Last 3 Results   Hemoglobin A1C 7.2  7.1  6.8      A1c collected in the office today is 6.8%, indicating Uncontrolled Type II diabetes.  This result is down from the prior result of 7.1% collected on 3/17/25     Glucose   Date Value Ref Range Status   06/23/2025 113 (H) 70 - 99 mg/dL Final     Comment:     Serial Number: [id number]:  [id number]     Point of care glucose in the office today is within normal limits for nonfasting glucose    Creatinine "   Date Value Ref Range Status   04/03/2025 1.20 0.60 - 1.30 mg/dL Final     Comment:     Serial Number: [id number]:  [id number]   11/22/2024 1.07 0.76 - 1.27 mg/dL Final     eGFR   Date Value Ref Range Status   04/03/2025 66.3 >60.0 mL/min/1.73 Final   11/22/2024 76.1 >60.0 mL/min/1.73 Final     Labs collected on 4/3/25 show Stage II mild (GFR = 60-89mL/min)                Diagnoses and all orders for this visit:    1. Controlled type 2 diabetes mellitus with diabetic polyneuropathy, with long-term current use of insulin (Primary)  -     POC Glycosylated Hemoglobin (Hb A1C)  -     POC Glucose    2. Type 2 diabetes mellitus with diabetic neuropathy, with long-term current use of insulin    3. Type 2 diabetes mellitus with stage 2 chronic kidney disease, with long-term current use of insulin    4. Uses self-applied continuous glucose monitoring device    5. Obesity (BMI 30-39.9)        Assessment & Plan  1. Diabetes Mellitus.  - His Patel sensor 14-day report indicates an average glucose level of 156, with 75% of readings within the target range.  - His A1c level is currently at 6.8. His weight has decreased by 9 pounds since the last visit.  - He has experienced some postprandial hyperglycemia, particularly after lunch and dinner, which may be due to variations in meal composition and occasional underestimation of food intake. No hypoglycemic episodes were reported on his sensor data.  - He was advised to reduce his insulin dose if he forgets to administer it during mealtime and only take a correction dose if it is significantly past the meal time to prevent hypoglycemia. He was also advised to carry quick-acting carbohydrates to manage potential hypoglycemic episodes.         The patient will monitor his blood glucose levels per continuous glucose monitor.  If he develops problematic hyperglycemia or hypoglycemia or adverse drug reactions, he will contact the office for further instructions.        Follow Up      Return in about 3 months (around 9/23/2025) for Medication Management, CGM Follow-up.    Patient was given instructions and counseling regarding his condition or for health maintenance advice. Please see specific information pulled into the AVS if appropriate.     Suzanna Agosto, ANGLE  06/23/2025        Dictated Utilizing Dragon Dictation.  Please note that portions of this note were completed with a voice recognition program.  Part of this note may be an electronic transcription/translation of spoken language to printed text using the Dragon Dictation System.

## 2025-06-23 ENCOUNTER — OFFICE VISIT (OUTPATIENT)
Dept: DIABETES SERVICES | Facility: HOSPITAL | Age: 70
End: 2025-06-23
Payer: MEDICARE

## 2025-06-23 VITALS
HEIGHT: 74 IN | OXYGEN SATURATION: 93 % | SYSTOLIC BLOOD PRESSURE: 119 MMHG | BODY MASS INDEX: 32.73 KG/M2 | WEIGHT: 255 LBS | HEART RATE: 84 BPM | DIASTOLIC BLOOD PRESSURE: 74 MMHG

## 2025-06-23 DIAGNOSIS — E11.42 CONTROLLED TYPE 2 DIABETES MELLITUS WITH DIABETIC POLYNEUROPATHY, WITH LONG-TERM CURRENT USE OF INSULIN: Primary | ICD-10-CM

## 2025-06-23 DIAGNOSIS — Z79.4 TYPE 2 DIABETES MELLITUS WITH STAGE 2 CHRONIC KIDNEY DISEASE, WITH LONG-TERM CURRENT USE OF INSULIN: ICD-10-CM

## 2025-06-23 DIAGNOSIS — E66.9 OBESITY (BMI 30-39.9): ICD-10-CM

## 2025-06-23 DIAGNOSIS — E11.40 TYPE 2 DIABETES MELLITUS WITH DIABETIC NEUROPATHY, WITH LONG-TERM CURRENT USE OF INSULIN: ICD-10-CM

## 2025-06-23 DIAGNOSIS — E11.22 TYPE 2 DIABETES MELLITUS WITH STAGE 2 CHRONIC KIDNEY DISEASE, WITH LONG-TERM CURRENT USE OF INSULIN: ICD-10-CM

## 2025-06-23 DIAGNOSIS — Z79.4 CONTROLLED TYPE 2 DIABETES MELLITUS WITH DIABETIC POLYNEUROPATHY, WITH LONG-TERM CURRENT USE OF INSULIN: Primary | ICD-10-CM

## 2025-06-23 DIAGNOSIS — Z97.8 USES SELF-APPLIED CONTINUOUS GLUCOSE MONITORING DEVICE: ICD-10-CM

## 2025-06-23 DIAGNOSIS — N18.2 TYPE 2 DIABETES MELLITUS WITH STAGE 2 CHRONIC KIDNEY DISEASE, WITH LONG-TERM CURRENT USE OF INSULIN: ICD-10-CM

## 2025-06-23 DIAGNOSIS — Z79.4 TYPE 2 DIABETES MELLITUS WITH DIABETIC NEUROPATHY, WITH LONG-TERM CURRENT USE OF INSULIN: ICD-10-CM

## 2025-06-23 LAB
EXPIRATION DATE: ABNORMAL
GLUCOSE BLDC GLUCOMTR-MCNC: 113 MG/DL (ref 70–99)
HBA1C MFR BLD: 6.8 % (ref 4.5–5.7)
Lab: ABNORMAL

## 2025-06-23 PROCEDURE — G0463 HOSPITAL OUTPT CLINIC VISIT: HCPCS | Performed by: NURSE PRACTITIONER

## 2025-06-23 PROCEDURE — 99213 OFFICE O/P EST LOW 20 MIN: CPT | Performed by: NURSE PRACTITIONER

## 2025-06-23 PROCEDURE — 82948 REAGENT STRIP/BLOOD GLUCOSE: CPT | Performed by: NURSE PRACTITIONER

## 2025-06-23 PROCEDURE — 1159F MED LIST DOCD IN RCRD: CPT | Performed by: NURSE PRACTITIONER

## 2025-06-23 PROCEDURE — 83036 HEMOGLOBIN GLYCOSYLATED A1C: CPT | Performed by: NURSE PRACTITIONER

## 2025-06-23 PROCEDURE — 3074F SYST BP LT 130 MM HG: CPT | Performed by: NURSE PRACTITIONER

## 2025-06-23 PROCEDURE — 3078F DIAST BP <80 MM HG: CPT | Performed by: NURSE PRACTITIONER

## 2025-06-23 PROCEDURE — 3044F HG A1C LEVEL LT 7.0%: CPT | Performed by: NURSE PRACTITIONER

## 2025-06-23 PROCEDURE — 1160F RVW MEDS BY RX/DR IN RCRD: CPT | Performed by: NURSE PRACTITIONER

## 2025-06-23 PROCEDURE — 95251 CONT GLUC MNTR ANALYSIS I&R: CPT | Performed by: NURSE PRACTITIONER

## 2025-06-23 RX ORDER — SPIRONOLACTONE 25 MG/1
25 TABLET ORAL DAILY
COMMUNITY
Start: 2025-06-18

## 2025-06-26 ENCOUNTER — TRANSCRIBE ORDERS (OUTPATIENT)
Dept: LAB | Facility: HOSPITAL | Age: 70
End: 2025-06-26
Payer: MEDICARE

## 2025-06-26 ENCOUNTER — LAB (OUTPATIENT)
Dept: LAB | Facility: HOSPITAL | Age: 70
End: 2025-06-26
Payer: MEDICARE

## 2025-06-26 DIAGNOSIS — R93.89 ABNORMAL FINDINGS ON DIAGNOSTIC IMAGING OF OTHER SPECIFIED BODY STRUCTURES: ICD-10-CM

## 2025-06-26 DIAGNOSIS — R16.1 SPLENOMEGALY: ICD-10-CM

## 2025-06-26 DIAGNOSIS — Z80.0 FAMILY HISTORY OF COLON CANCER: ICD-10-CM

## 2025-06-26 DIAGNOSIS — D69.6 THROMBOCYTOPENIA: ICD-10-CM

## 2025-06-26 DIAGNOSIS — R94.5 ABNORMAL RESULTS OF LIVER FUNCTION STUDIES: ICD-10-CM

## 2025-06-26 DIAGNOSIS — E55.9 VITAMIN D DEFICIENCY, UNSPECIFIED: ICD-10-CM

## 2025-06-26 DIAGNOSIS — I50.22 CHRONIC SYSTOLIC HEART FAILURE: ICD-10-CM

## 2025-06-26 DIAGNOSIS — K76.0 HEPATIC STEATOSIS: ICD-10-CM

## 2025-06-26 DIAGNOSIS — I50.22 CHRONIC SYSTOLIC HEART FAILURE: Primary | ICD-10-CM

## 2025-06-26 LAB
25(OH)D3 SERPL-MCNC: 97.9 NG/ML (ref 30–100)
ALPHA-FETOPROTEIN: 2.9 NG/ML (ref 0–8.3)
ANION GAP SERPL CALCULATED.3IONS-SCNC: 7.7 MMOL/L (ref 5–15)
BASOPHILS # BLD AUTO: 0.02 10*3/MM3 (ref 0–0.2)
BASOPHILS NFR BLD AUTO: 0.6 % (ref 0–1.5)
BUN SERPL-MCNC: 27 MG/DL (ref 8–23)
BUN/CREAT SERPL: 22.7 (ref 7–25)
CALCIUM SPEC-SCNC: 9.4 MG/DL (ref 8.6–10.5)
CERULOPLASMIN SERPL-MCNC: 18 MG/DL (ref 16–31)
CHLORIDE SERPL-SCNC: 101 MMOL/L (ref 98–107)
CO2 SERPL-SCNC: 30.3 MMOL/L (ref 22–29)
CREAT SERPL-MCNC: 1.19 MG/DL (ref 0.76–1.27)
DEPRECATED RDW RBC AUTO: 45.3 FL (ref 37–54)
EGFRCR SERPLBLD CKD-EPI 2021: 67 ML/MIN/1.73
EOSINOPHIL # BLD AUTO: 0.05 10*3/MM3 (ref 0–0.4)
EOSINOPHIL NFR BLD AUTO: 1.6 % (ref 0.3–6.2)
ERYTHROCYTE [DISTWIDTH] IN BLOOD BY AUTOMATED COUNT: 13.9 % (ref 12.3–15.4)
FOLATE SERPL-MCNC: 19.2 NG/ML (ref 4.78–24.2)
GLUCOSE SERPL-MCNC: 134 MG/DL (ref 65–99)
HAV IGM SERPL QL IA: NORMAL
HBV CORE IGM SERPL QL IA: NORMAL
HBV SURFACE AG SERPL QL IA: NORMAL
HCT VFR BLD AUTO: 46.3 % (ref 37.5–51)
HCV AB SER QL: NORMAL
HGB BLD-MCNC: 15.2 G/DL (ref 13–17.7)
IMM GRANULOCYTES # BLD AUTO: 0.01 10*3/MM3 (ref 0–0.05)
IMM GRANULOCYTES NFR BLD AUTO: 0.3 % (ref 0–0.5)
INR PPP: 1.14 (ref 0.86–1.15)
IRON 24H UR-MRATE: 79 MCG/DL (ref 59–158)
IRON SATN MFR SERPL: 13 % (ref 20–50)
LYMPHOCYTES # BLD AUTO: 1.09 10*3/MM3 (ref 0.7–3.1)
LYMPHOCYTES NFR BLD AUTO: 34.8 % (ref 19.6–45.3)
MCH RBC QN AUTO: 29.4 PG (ref 26.6–33)
MCHC RBC AUTO-ENTMCNC: 32.8 G/DL (ref 31.5–35.7)
MCV RBC AUTO: 89.6 FL (ref 79–97)
MONOCYTES # BLD AUTO: 0.33 10*3/MM3 (ref 0.1–0.9)
MONOCYTES NFR BLD AUTO: 10.5 % (ref 5–12)
NEUTROPHILS NFR BLD AUTO: 1.63 10*3/MM3 (ref 1.7–7)
NEUTROPHILS NFR BLD AUTO: 52.2 % (ref 42.7–76)
NRBC BLD AUTO-RTO: 0 /100 WBC (ref 0–0.2)
PLATELET # BLD AUTO: 73 10*3/MM3 (ref 140–450)
PMV BLD AUTO: 11.1 FL (ref 6–12)
POTASSIUM SERPL-SCNC: 4.6 MMOL/L (ref 3.5–5.2)
PROTHROMBIN TIME: 15.2 SECONDS (ref 11.8–14.9)
RBC # BLD AUTO: 5.17 10*6/MM3 (ref 4.14–5.8)
SODIUM SERPL-SCNC: 139 MMOL/L (ref 136–145)
T4 FREE SERPL-MCNC: 0.98 NG/DL (ref 0.92–1.68)
TESTOST SERPL-MCNC: 8.21 NG/DL (ref 193–740)
TIBC SERPL-MCNC: 597 MCG/DL (ref 298–536)
TRANSFERRIN SERPL-MCNC: 401 MG/DL (ref 200–360)
TSH SERPL DL<=0.05 MIU/L-ACNC: 1.36 UIU/ML (ref 0.27–4.2)
VIT B12 BLD-MCNC: >2000 PG/ML (ref 211–946)
WBC NRBC COR # BLD AUTO: 3.13 10*3/MM3 (ref 3.4–10.8)

## 2025-06-26 PROCEDURE — 85610 PROTHROMBIN TIME: CPT

## 2025-06-26 PROCEDURE — 86644 CMV ANTIBODY: CPT

## 2025-06-26 PROCEDURE — 80048 BASIC METABOLIC PNL TOTAL CA: CPT

## 2025-06-26 PROCEDURE — 86038 ANTINUCLEAR ANTIBODIES: CPT

## 2025-06-26 PROCEDURE — 84443 ASSAY THYROID STIM HORMONE: CPT

## 2025-06-26 PROCEDURE — 86645 CMV ANTIBODY IGM: CPT

## 2025-06-26 PROCEDURE — 86664 EPSTEIN-BARR NUCLEAR ANTIGEN: CPT

## 2025-06-26 PROCEDURE — 82746 ASSAY OF FOLIC ACID SERUM: CPT

## 2025-06-26 PROCEDURE — 82104 ALPHA-1-ANTITRYPSIN PHENO: CPT

## 2025-06-26 PROCEDURE — 36415 COLL VENOUS BLD VENIPUNCTURE: CPT

## 2025-06-26 PROCEDURE — 82103 ALPHA-1-ANTITRYPSIN TOTAL: CPT

## 2025-06-26 PROCEDURE — 80074 ACUTE HEPATITIS PANEL: CPT

## 2025-06-26 PROCEDURE — 82105 ALPHA-FETOPROTEIN SERUM: CPT | Performed by: NURSE PRACTITIONER

## 2025-06-26 PROCEDURE — 85025 COMPLETE CBC W/AUTO DIFF WBC: CPT

## 2025-06-26 PROCEDURE — 83540 ASSAY OF IRON: CPT

## 2025-06-26 PROCEDURE — 84466 ASSAY OF TRANSFERRIN: CPT

## 2025-06-26 PROCEDURE — 86381 MITOCHONDRIAL ANTIBODY EACH: CPT

## 2025-06-26 PROCEDURE — 84403 ASSAY OF TOTAL TESTOSTERONE: CPT

## 2025-06-26 PROCEDURE — 86665 EPSTEIN-BARR CAPSID VCA: CPT

## 2025-06-26 PROCEDURE — 82306 VITAMIN D 25 HYDROXY: CPT

## 2025-06-26 PROCEDURE — 84439 ASSAY OF FREE THYROXINE: CPT

## 2025-06-26 PROCEDURE — 86015 ACTIN ANTIBODY EACH: CPT

## 2025-06-26 PROCEDURE — 82607 VITAMIN B-12: CPT

## 2025-06-26 PROCEDURE — 82390 ASSAY OF CERULOPLASMIN: CPT

## 2025-06-27 LAB
ANA SER QL: NEGATIVE
CMV IGG SERPL IA-ACNC: <0.6 U/ML (ref 0–0.59)
CMV IGM SERPL IA-ACNC: <30 AU/ML (ref 0–29.9)
EBV NA IGG SER IA-ACNC: 225 U/ML (ref 0–17.9)
EBV VCA IGG SER IA-ACNC: 168 U/ML (ref 0–17.9)
EBV VCA IGM SER IA-ACNC: <36 U/ML (ref 0–35.9)
MITOCHONDRIA M2 IGG SER-ACNC: <20 UNITS (ref 0–20)
SERVICE CMNT-IMP: ABNORMAL
SMA IGG SER-ACNC: 6 UNITS (ref 0–19)

## 2025-06-30 ENCOUNTER — OFFICE VISIT (OUTPATIENT)
Dept: ONCOLOGY | Facility: HOSPITAL | Age: 70
End: 2025-06-30
Payer: MEDICARE

## 2025-06-30 VITALS
RESPIRATION RATE: 16 BRPM | DIASTOLIC BLOOD PRESSURE: 77 MMHG | WEIGHT: 260.14 LBS | HEART RATE: 75 BPM | BODY MASS INDEX: 33.39 KG/M2 | OXYGEN SATURATION: 92 % | SYSTOLIC BLOOD PRESSURE: 117 MMHG | TEMPERATURE: 97.9 F | HEIGHT: 74 IN

## 2025-06-30 DIAGNOSIS — R16.1 SPLENOMEGALY: ICD-10-CM

## 2025-06-30 DIAGNOSIS — D64.9 ANEMIA, UNSPECIFIED TYPE: Primary | ICD-10-CM

## 2025-06-30 DIAGNOSIS — D72.819 LEUKOPENIA, UNSPECIFIED TYPE: ICD-10-CM

## 2025-06-30 DIAGNOSIS — D69.6 THROMBOCYTOPENIA: ICD-10-CM

## 2025-06-30 PROCEDURE — G0463 HOSPITAL OUTPT CLINIC VISIT: HCPCS | Performed by: INTERNAL MEDICINE

## 2025-06-30 NOTE — PROGRESS NOTES
Chief Complaint/Reason for Referral:  Neutropenia, unspecified type/Thrombocytopenia    Charlotte Kulkarni, Charlotte Huddleston,     Records Obtained:  Records of the patients history including those obtained from  [facility] and patient information were reviewed and summarized in detail.    Subjective    History of Present Illness  Mr. Chaparro Weber is a very pleasant 69 year old  male who presents to hematology for thrombocytopenia and leukopenia. His PMH includes: HLD, type 2 DM, asthma, arthritis, COPD, non-ischemic cardiomyopathy, CHF, left BBB, kidney stone, GERD, Hypertension and fatty liver.     Results of bone marrow discussed.  Pulmonary results were normal.  Patient taking high-dose aspirin.  Recommend he decrease to 81 mg daily.  Denies any bleeding.  Denies easy bruising.  Feels about the same.  Follows with endocrinology.  Saw them recently.  Diabetes is a good news from his bone marrow.  Follows with cardiology for his heart. Has ongoing back pain that limits his activity along with fatigue.  Not as active as he was before the surgery in June 2023.  Following with GI.  They did scopes back in April.  EGD did show gastric ulcer.  Colonoscopy showed several polyps that were benign.       Hematology History  Lab work shows has low WBC count down to 2.59 on 11/13/24 and again on 10/11/2024 and was 3.31. Absolute neutrophil count has been trending down since October of 2023. Most recent ANC on 11/13/2024 was 1150. Normal WBC and differential in 6/30/2023.  Lab work shows thrombocytopenia in the range of 66 to 138  since September of 2020. Has been trending downward recently in the 60-80's range. Reports occasionally when he bumps himself, his skin will start bleeding. Had prior anemia with hemoglobin down to 8.2 to 12.8 since June of 2023. Recent lab work showed normal hemoglobin of 14.7. MCV is normal at 80.     6/2023: CT abdomen/pelvis showed fatty liver and an enlarged spleen. He has not had any recent  imaging. FINDINGS: Limited images of the lower chest demonstrate tiny effusions and basilar atelectasis. The heart is normal in size. A pacemaker defibrillator is in place. There is hepatic steatosis. There has been a prior cholecystectomy. The pancreas and adrenal glands are normal. There is mild splenomegaly. There is a 7 mm nonobstructing calculus within the lower pole of the left kidney with additional bilateral punctate nonobstructing calculi. The bladder is normal. There is diverticulosis of the descending and sigmoid colon. There is no diverticulitis. The remainder of the colon is normal. The stomach and small bowel are unremarkable. There is no obstruction. No free intraperitoneal gas or fluid is present. There is no lymphadenopathy. There is trace atherosclerotic calcification within the distal aorta and iliac arteries. Stranding is noted within the subcutaneous tissues of the low anterior chest and upper abdomen.     11/21/24: WBC 2.79, hgb 14.4, plt 76, MCV 91.4, ANC 1.4, Ferritin 101, iron sat 17, B12 1604, folate >20, smear with absolute neutrophilia but normal morphology of all cell lines, flow cytometry was normal    5/29/24: Plt 64K, WBC 2.29, ANC 1.09, hgb 13.6    6/2/25: Bone marrow showed normocellular bone marrow with maturing trilineage hematopoiesis.  No dysplastic changes seen.  MDS FISH panel was negative.  Chromosome analysis showed normal male karyotype.  Flow cytometry was normal.    6/26/25: WBC 3.13, plt 73, hgb 15.2, ANC 1.63            Oncology/Hematology History    No history exists.       Review of Systems   Constitutional:  Positive for fatigue. Negative for appetite change, diaphoresis, fever, unexpected weight gain and unexpected weight loss.   HENT: Negative.  Negative for hearing loss, mouth sores, sore throat, swollen glands, trouble swallowing and voice change.    Eyes: Negative.  Negative for blurred vision.   Respiratory: Negative.  Negative for cough, shortness of breath  and wheezing.    Cardiovascular: Negative.  Negative for chest pain and palpitations.   Gastrointestinal: Negative.  Negative for abdominal pain, blood in stool, constipation, diarrhea, nausea and vomiting.   Endocrine: Negative.  Negative for cold intolerance and heat intolerance.   Genitourinary: Negative.  Negative for difficulty urinating, dysuria, frequency, hematuria and urinary incontinence.   Musculoskeletal:  Positive for back pain. Negative for arthralgias and myalgias.   Skin:  Negative for rash, skin lesions and wound.   Allergic/Immunologic: Negative.    Neurological: Negative.  Negative for dizziness, seizures, weakness, numbness and headache.   Hematological:  Bruises/bleeds easily.   Psychiatric/Behavioral: Negative.  Negative for behavioral problems and depressed mood. The patient is not nervous/anxious.    All other systems reviewed and are negative.      Current Outpatient Medications on File Prior to Visit   Medication Sig Dispense Refill    aspirin  MG tablet Take 1 tablet by mouth Daily.      B Complex Vitamins (vitamin b complex) capsule capsule Take  by mouth Daily.      Biotin 09825 MCG tablet Take  by mouth.      celecoxib (CeleBREX) 100 MG capsule TAKE ONE CAPSULE BY MOUTH TWICE A DAY AS DIRECTED FOR 30 DAYS.      cholecalciferol (VITAMIN D3) 25 MCG (1000 UT) tablet Take 2 tablets by mouth Daily.      Continuous Glucose Sensor (FreeStyle Patel 3 Sensor) [facility] Use 1 each Every 14 (Fourteen) Days. 6 each 1    cyclobenzaprine (FLEXERIL) 5 MG tablet Take 1-2 tablets prn tid for muscle spasm 90 tablet 1    Dulaglutide (Trulicity) 4.5 MG/0.5ML solution auto-injector Inject 4.5 mg under the skin into the appropriate area as directed 1 (One) Time Per Week. 6 mL 3    empagliflozin (Jardiance) 25 MG tablet tablet       escitalopram (LEXAPRO) 20 MG tablet TAKE ONE TABLET BY MOUTH ONCE DAILY 30 tablet 2    fenofibrate (TRICOR) 145 MG tablet TAKE ONE TABLET BY MOUTH ONCE DAILY 90 tablet 2     glucose blood test strip Use as instructed 100 each 2    glucose blood test strip OneTouch Ultra Test strips      ibuprofen (ADVIL,MOTRIN) 200 MG tablet Take 2 tablets by mouth 2 (Two) Times a Day. (Patient not taking: Reported on 6/30/2025)      Insulin Glargine (BASAGLAR KWIKPEN) 100 UNIT/ML injection pen Inject 90 Units under the skin into the appropriate area as directed Daily for 180 days. (Patient taking differently: Inject 105 Units under the skin into the appropriate area as directed Daily. 55 units in the AM and 50 units in the PM) 81 mL 1    Insulin Pen Needle (Comfort EZ Pen Needles) 32G X 5 MM misc USE 1 EACH 5 (FIVE) TIMES A DAY. 450 each 1    Lyumjev KwikPen 200 UNIT/ML solution pen-injector Inject 40 Units under the skin into the appropriate area as directed 3 (Three) Times a Day Before Meals. 72 mL 4    metoprolol succinate XL (TOPROL-XL) 25 MG 24 hr tablet Take 1 tablet by mouth Daily.      multivitamin with minerals (PRESERVISION AREDS PO) Take 1 tablet by mouth Daily.      NON FORMULARY Move free joint health      Omega-3 Fatty Acids (fish oil) 1200 MG capsule capsule Take 1 capsule by mouth Daily.      oxyCODONE-acetaminophen (PERCOCET)  MG per tablet Take 1 tablet by mouth Every 4 (Four) Hours As Needed.      oxyCODONE-acetaminophen (PERCOCET) 7.5-325 MG per tablet  (Patient not taking: Reported on 6/3/2025)      pantoprazole (PROTONIX) 40 MG EC tablet Take 1 tablet by mouth Daily. 90 tablet 3    pregabalin (LYRICA) 300 MG capsule Take 1 capsule by mouth Daily.      psyllium (Metamucil) 0.52 g capsule       QUERCETIN PO Take 1,000 mg by mouth Daily.      rosuvastatin (CRESTOR) 10 MG tablet       spironolactone (ALDACTONE) 25 MG tablet Take 1 tablet by mouth Daily.      tamsulosin (FLOMAX) 0.4 MG capsule 24 hr capsule Take 2 capsules by mouth Daily. 180 capsule 4    vitamin B-12 (CYANOCOBALAMIN) 1000 MCG tablet Take 1,200 mcg by mouth Daily.      vitamin E 100 UNIT capsule Take 1 capsule by  mouth Daily.      Zinc 50 MG capsule Take  by mouth.       No current facility-administered medications on file prior to visit.       Allergies   Allergen Reactions    Codeine Nausea And Vomiting and Nausea Only     Past Medical History:   Diagnosis Date    Anxiety     Arthritis     Bradycardia     DEFBIRLLATOR/ PACEMAKER- MEDITRONIC- SEE'S DR. HAMEED DENIED CP/SOB    Chest pain     NO RECENT CP    CHF (congestive heart failure)     ASYMPTOMATIC    Colon polyp     Condition not found     Hernia-Unspecified    Coronary artery disease     Deafness     Diabetes     BG RUNS AROUND 250-300 IN AM    Fatty liver     HBP (high blood pressure)     Heart disease     High cholesterol     Kidney stones     Lung disease     Night sweats     Ringing in ear     Sleep apnea     Type 2 diabetes mellitus with autonomic neuropathy 07/16/2021     Past Surgical History:   Procedure Laterality Date    APPENDECTOMY      BACK SURGERY  2000,2010    lumbar with hardware    CARDIAC DEFIBRILLATOR PLACEMENT Right 2024    MEDITRONIC    CHOLECYSTECTOMY      COLONOSCOPY N/A 06/22/2022    Procedure: COLONOSCOPY;  Surgeon: Nicholas Townsend MD;  Location: [facility] ENDOSCOPY;  Service: General;  Laterality: N/A;  DIVERTICULOSIS    COLONOSCOPY N/A 4/10/2025    Procedure: COLONOSCOPY with hot snare polypectomies;  Surgeon: Nick Ballard MD;  Location: [facility] ENDOSCOPY;  Service: Gastroenterology;  Laterality: N/A;  colon polyps, diverticulosis    CYSTOSCOPY W/ STONE MANIPULATION Right 01/05/2023    Procedure: CYSTOSCOPY KIDNEY STONE MANIPULATION/EXTRACTION;  Surgeon: Ann Mon MD;  Location: [facility] MAIN OR;  Service: Urology;  Laterality: Right;    ENDOSCOPY N/A 4/10/2025    Procedure: ESOPHAGOGASTRODUODENOSCOPY with biopsies;  Surgeon: Nick Ballard MD;  Location: [facility] ENDOSCOPY;  Service: Gastroenterology;  Laterality: N/A;  gastric antrum ulcers, gastritis    KIDNEY STONE SURGERY      NECK SURGERY  2005    removal of bone  spurs    PACEMAKER IMPLANTATION      10 YEARS AGO PLACED- MEDITRONIC    URETEROSCOPY LASER LITHOTRIPSY WITH STENT INSERTION Left 2023    Procedure: LEFT URETEROSCOPY LASER LITHOTRIPSY WITH STENT INSERTION AND RETROGRADE; RIGHT URETEROSCOPY WITH STENT INSERTION AND RETROGRADE;  Surgeon: Ann Mon MD;  Location: [facility] OR;  Service: Urology;  Laterality: Left;     Social History     Socioeconomic History    Marital status:     Number of children: 2   Tobacco Use    Smoking status: Former     Current packs/day: 0.00     Average packs/day: 1.5 packs/day for 40.0 years (60.0 ttl pk-yrs)     Types: Cigarettes     Start date: 1965     Quit date:      Years since quittin.5     Passive exposure: Past    Smokeless tobacco: Never    Tobacco comments:     no second hand smoke exposure   Vaping Use    Vaping status: Never Used   Substance and Sexual Activity    Alcohol use: Never    Drug use: Never    Sexual activity: Defer     Family History   Problem Relation Age of Onset    Other Mother 60        Renal Cancer    Prostate cancer Father     Colon cancer Father 70    Malig Hyperthermia Neg Hx      Immunization History   Administered Date(s) Administered    Flu Vaccine Quad PF >36MO 2017, 2020    Fluzone (or Fluarix & Flulaval for VFC) >6mos 2017, 2020    Fluzone High-Dose 65+yrs 10/02/2023    Influenza Injectable Mdck Pf Quad 10/12/2017    Influenza, Unspecified 10/12/2017, 2020    Pneumococcal Conjugate 20-Valent (PCV20) 10/07/2024    Tdap 2019       Tobacco Use: Medium Risk (2025)    Patient History     Smoking Tobacco Use: Former     Smokeless Tobacco Use: Never     Passive Exposure: Past       Objective     Physical Exam  Constitutional:       Appearance: Normal appearance. He is obese.   HENT:      Head: Normocephalic and atraumatic.      Nose: Nose normal.   Eyes:      Conjunctiva/sclera: Conjunctivae normal.   Pulmonary:      Effort:  "Pulmonary effort is normal.   Neurological:      General: No focal deficit present.      Mental Status: He is alert. Mental status is at baseline.   Psychiatric:         Mood and Affect: Mood normal.         Behavior: Behavior normal.         Thought Content: Thought content normal.         Vitals:    06/30/25 1259   BP: 117/77   Pulse: 75   Resp: 16   Temp: 97.9 °F (36.6 °C)   TempSrc: Oral   SpO2: 92%   Weight: 118 kg (260 lb 2.3 oz)   Height: 188 cm (74.02\")   PainSc: 0-No pain             Wt Readings from Last 3 Encounters:   06/23/25 116 kg (255 lb)   06/02/25 119 kg (263 lb 0.1 oz)   04/14/25 121 kg (266 lb 12.8 oz)                 ECOG: (0) Fully Active - Able to Carry On All Pre-disease Performance Without Restriction  Fall Risk Assessment was completed, and patient is at low risk for falls.  PHQ-9 Total Score:         The patient is  experiencing fatigue. Fatigue score: 4    PT/OT Functional Screening: PT fx screen : No needs identified  Speech Functional Screening: Speech fx screen : No needs identified  Rehab to be ordered: Rehab to be ordered : No needs identified        Result Review :  The following data was reviewed by: Denisa Bahena MA on 06/30/25:  Lab Results   Component Value Date    HGB 15.2 06/26/2025    HCT 46.3 06/26/2025    MCV 89.6 06/26/2025    PLT 73 (L) 06/26/2025    WBC 3.13 (L) 06/26/2025    NEUTROABS 1.63 (L) 06/26/2025    LYMPHSABS 1.09 06/26/2025    MONOSABS 0.33 06/26/2025    EOSABS 0.05 06/26/2025    BASOSABS 0.02 06/26/2025     Lab Results   Component Value Date    GLUCOSE 134 (H) 06/26/2025    BUN 27.0 (H) 06/26/2025    CREATININE 1.19 06/26/2025     06/26/2025    K 4.6 06/26/2025     06/26/2025    CO2 30.3 (H) 06/26/2025    CALCIUM 9.4 06/26/2025    PROTEINTOT 7.1 11/22/2024    ALBUMIN 4.4 11/22/2024    BILITOT 0.5 11/22/2024    ALKPHOS 49 11/22/2024    AST 43 (H) 11/22/2024    ALT 31 11/22/2024     Lab Results   Component Value Date     05/29/2025    " Ferritin 101.80 11/21/2024    Folate 19.20 06/26/2025     Lab Results   Component Value Date    IRON 79 06/26/2025    LABIRON 13 (L) 06/26/2025    TRANSFERRIN 401 (H) 06/26/2025    TIBC 597 (H) 06/26/2025     Lab Results   Component Value Date     05/29/2025    FERRITIN 101.80 11/21/2024    MFCBBIND20 >2,000 (H) 06/26/2025    FOLATE 19.20 06/26/2025     Lab Results   Component Value Date    PSA <0.014 10/11/2024    AFP 2.90 06/26/2025     Labs personally reviewed. ANC low. Plts low. Hgb normal.     Bone marrow results personally reviewed    Endocrine note personally reviewed         Assessment and Plan:  Diagnoses and all orders for this visit:    1. Anemia, unspecified type (Primary)  -     CBC & Differential; Future  -     Ferritin; Future  -     Iron Profile w/o Ferritin; Future    2. Thrombocytopenia  -     CBC & Differential; Future    3. Leukopenia, unspecified type  -     CBC & Differential; Future    4. Splenomegaly          Neutropenia  Thrombocytopenia  ANC not less than 1K. Denies any fevers, chills, or weight loss. No frequent infections. Also, with longstanding chronic thrombocytopenia since at least September of 2020. Likely, the leukopenia and the thrombocytopenia is secondary to fatty liver and splenomegaly seen on prior CT abdomen in 2023.  Fortunately without bleeding.     Reassuringly he is not anemic. Smear with normal morphology. Flow cytometry also normal. B12, folate, iron studies normal.     Due to lower ANC and platelets of bone marrow was performed.  This was performed on 6/2/2025.  This showed normocellular bone marrow with maturing trilineage hematopoiesis.  No dysplastic changes seen.  MDS FISH panel was negative.  Chromosome analysis showed normal male karyotype.  Flow cytometry was normal. Results discussed.  Reassurance provided that he does not have pre-leukemia, MDS, or alternative bone marrow process occurring.     Repeat labs 6 months due to normal bone marrow.      Recommend to decrease ASA to 81 mg.       Fatty liver  Splenomegaly  Has been referred to gastroenterology for evaluation for the fatty liver, splenomegaly to rule out any underlying liver cirrhosis. Not able to get MRI liver due to pacemaker. Weight loss has been recommended.     Gastric ulcer  Per EGD 4/2025. H pylori negative. PPI daily recommended.           Patient Follow Up: after bone marrow biopsy    Patient was given instructions and counseling regarding his condition or for health maintenance advice. Please see specific information pulled into the AVS if appropriate.

## 2025-07-01 LAB — CCV RESULT: NORMAL

## 2025-07-02 LAB
A1AT PHENOTYP SERPL IFE: NORMAL
A1AT SERPL-MCNC: 150 MG/DL (ref 101–187)

## 2025-07-22 DIAGNOSIS — Z79.4 CONTROLLED TYPE 2 DIABETES MELLITUS WITH DIABETIC POLYNEUROPATHY, WITH LONG-TERM CURRENT USE OF INSULIN: Primary | ICD-10-CM

## 2025-07-22 DIAGNOSIS — E11.42 CONTROLLED TYPE 2 DIABETES MELLITUS WITH DIABETIC POLYNEUROPATHY, WITH LONG-TERM CURRENT USE OF INSULIN: Primary | ICD-10-CM

## 2025-07-25 RX ORDER — EMPAGLIFLOZIN 25 MG/1
25 TABLET, FILM COATED ORAL DAILY
Qty: 90 TABLET | Refills: 2 | OUTPATIENT
Start: 2025-07-25

## 2025-08-29 ENCOUNTER — OFFICE VISIT (OUTPATIENT)
Dept: DIABETES SERVICES | Facility: HOSPITAL | Age: 70
End: 2025-08-29
Payer: MEDICARE

## 2025-08-29 VITALS
WEIGHT: 264.3 LBS | OXYGEN SATURATION: 94 % | BODY MASS INDEX: 33.92 KG/M2 | HEIGHT: 74 IN | SYSTOLIC BLOOD PRESSURE: 115 MMHG | HEART RATE: 90 BPM | DIASTOLIC BLOOD PRESSURE: 72 MMHG | RESPIRATION RATE: 16 BRPM

## 2025-08-29 DIAGNOSIS — E11.40 TYPE 2 DIABETES MELLITUS WITH DIABETIC NEUROPATHY, WITH LONG-TERM CURRENT USE OF INSULIN: ICD-10-CM

## 2025-08-29 DIAGNOSIS — Z79.4 TYPE 2 DIABETES MELLITUS WITH DIABETIC NEUROPATHY, WITH LONG-TERM CURRENT USE OF INSULIN: ICD-10-CM

## 2025-08-29 DIAGNOSIS — E11.65 UNCONTROLLED TYPE 2 DIABETES MELLITUS WITH HYPERGLYCEMIA: Primary | ICD-10-CM

## 2025-08-29 DIAGNOSIS — Z97.8 USES SELF-APPLIED CONTINUOUS GLUCOSE MONITORING DEVICE: ICD-10-CM

## 2025-08-29 DIAGNOSIS — Z79.4 TYPE 2 DIABETES MELLITUS WITH STAGE 2 CHRONIC KIDNEY DISEASE, WITH LONG-TERM CURRENT USE OF INSULIN: ICD-10-CM

## 2025-08-29 DIAGNOSIS — E11.22 TYPE 2 DIABETES MELLITUS WITH STAGE 2 CHRONIC KIDNEY DISEASE, WITH LONG-TERM CURRENT USE OF INSULIN: ICD-10-CM

## 2025-08-29 DIAGNOSIS — N18.2 TYPE 2 DIABETES MELLITUS WITH STAGE 2 CHRONIC KIDNEY DISEASE, WITH LONG-TERM CURRENT USE OF INSULIN: ICD-10-CM

## 2025-08-29 DIAGNOSIS — E66.9 OBESITY (BMI 30-39.9): ICD-10-CM

## 2025-08-29 LAB
EXPIRATION DATE: ABNORMAL
GLUCOSE BLDC GLUCOMTR-MCNC: 248 MG/DL (ref 70–99)
HBA1C MFR BLD: 7.3 % (ref 4.5–5.7)
Lab: ABNORMAL

## 2025-08-29 PROCEDURE — G0463 HOSPITAL OUTPT CLINIC VISIT: HCPCS | Performed by: NURSE PRACTITIONER

## 2025-08-29 PROCEDURE — 83036 HEMOGLOBIN GLYCOSYLATED A1C: CPT | Performed by: NURSE PRACTITIONER

## 2025-08-29 PROCEDURE — 82948 REAGENT STRIP/BLOOD GLUCOSE: CPT | Performed by: NURSE PRACTITIONER

## 2025-08-29 RX ORDER — HYDROCHLOROTHIAZIDE 12.5 MG/1
CAPSULE ORAL
Qty: 6 EACH | Refills: 1 | Status: SHIPPED | OUTPATIENT
Start: 2025-08-29

## (undated) DEVICE — THE SINGLE USE ETRAP – POLYP TRAP IS USED FOR SUCTION RETRIEVAL OF ENDOSCOPICALLY REMOVED POLYPS.: Brand: ETRAP

## (undated) DEVICE — SOL IRR NACL 0.9PCT 3000ML

## (undated) DEVICE — SYS IRR PUMP SGL ACTN VAC SYR 10CC

## (undated) DEVICE — GOWN,REINFORCE,POLY,SIRUS,BREATH SLV,XLG: Brand: MEDLINE

## (undated) DEVICE — CONN JET HYDRA H20 AUXILIARY DISP

## (undated) DEVICE — SOL IRRG H2O PL/BG 1000ML STRL

## (undated) DEVICE — SNAR POLYP CAPTIFLEX XS/OVL 11X2.4MM 240CM 1P/U

## (undated) DEVICE — URETERAL ACCESS SHEATH SET: Brand: NAVIGATOR HD

## (undated) DEVICE — GW SUREGLIDE FLXTIP ANG/TP .038 3X150CM EA/5

## (undated) DEVICE — ENDOSCOPIC VALVE WITH ADAPTER.: Brand: SURSEAL® II

## (undated) DEVICE — GW SUREGLIDE FLXTIP STR/TP .035 3X150CM EA/5

## (undated) DEVICE — Y-TYPE TUR/BLADDER IRRIGATION SET, REGULATING CLAMP

## (undated) DEVICE — CYSTO PACK: Brand: MEDLINE INDUSTRIES, INC.

## (undated) DEVICE — SHEATH ACC URETRL UROPASS 12/14F 24CM EA/5

## (undated) DEVICE — SOLIDIFIER LIQLOC PLS 1500CC BT

## (undated) DEVICE — TOWEL,OR,DSP,ST,BLUE,STD,4/PK,20PK/CS: Brand: MEDLINE

## (undated) DEVICE — BASIC SINGLE BASIN-LF: Brand: MEDLINE INDUSTRIES, INC.

## (undated) DEVICE — Device: Brand: DEFENDO AIR/WATER/SUCTION AND BIOPSY VALVE

## (undated) DEVICE — PAD GRND REM POLYHESIVE A/ DISP

## (undated) DEVICE — FIBR LASR HOLMIUM COMPAT 272MH DISP

## (undated) DEVICE — SOL IRR NACL 0.9PCT BT 1000ML

## (undated) DEVICE — GLV SURG SENSICARE SLT PF LF 6.5 STRL

## (undated) DEVICE — BLCK/BITE BLOX WO/DENTL/RIM W/STRAP 54F

## (undated) DEVICE — TRY PREP SCRB VAG PVP

## (undated) DEVICE — DEFENDO AIR WATER SUCTION AND BIOPSY VALVE KIT FOR  OLYMPUS: Brand: DEFENDO AIR/WATER/SUCTION AND BIOPSY VALVE

## (undated) DEVICE — LINER SURG CANSTR SXN S/RIGD 1500CC

## (undated) DEVICE — GLV SURG BIOGEL LTX PF 7 1/2

## (undated) DEVICE — THE STERILE LIGHT HANDLE COVER IS USED WITH STERIS SURGICAL LIGHTING AND VISUALIZATION SYSTEMS.

## (undated) DEVICE — Device

## (undated) DEVICE — SINGLE-USE BIOPSY FORCEPS: Brand: RADIAL JAW 4

## (undated) DEVICE — COLON KIT: Brand: MEDLINE INDUSTRIES, INC.

## (undated) DEVICE — NITINOL STONE RETRIEVAL BASKET: Brand: ESCAPE

## (undated) DEVICE — DRSNG SURESITE WNDW 2.38X2.75